# Patient Record
Sex: FEMALE | Race: WHITE | NOT HISPANIC OR LATINO | Employment: OTHER | URBAN - METROPOLITAN AREA
[De-identification: names, ages, dates, MRNs, and addresses within clinical notes are randomized per-mention and may not be internally consistent; named-entity substitution may affect disease eponyms.]

---

## 2019-09-25 ENCOUNTER — TRANSCRIBE ORDERS (OUTPATIENT)
Dept: ADMINISTRATIVE | Facility: HOSPITAL | Age: 69
End: 2019-09-25

## 2019-09-25 ENCOUNTER — APPOINTMENT (OUTPATIENT)
Dept: RADIOLOGY | Facility: CLINIC | Age: 69
End: 2019-09-25
Payer: MEDICARE

## 2019-09-25 DIAGNOSIS — M99.02 THORACIC SEGMENT DYSFUNCTION: ICD-10-CM

## 2019-09-25 DIAGNOSIS — M53.1 DIFFUSE CERVICOBRACHIAL SYNDROME: ICD-10-CM

## 2019-09-25 DIAGNOSIS — M99.01 CERVICAL SEGMENT DYSFUNCTION: ICD-10-CM

## 2019-09-25 DIAGNOSIS — M54.13 RADICULOPATHY OF CERVICOTHORACIC REGION: ICD-10-CM

## 2019-09-25 DIAGNOSIS — M99.02 THORACIC SEGMENT DYSFUNCTION: Primary | ICD-10-CM

## 2019-09-25 PROCEDURE — 72070 X-RAY EXAM THORAC SPINE 2VWS: CPT

## 2019-09-25 PROCEDURE — 72040 X-RAY EXAM NECK SPINE 2-3 VW: CPT

## 2021-02-24 ENCOUNTER — CONSULT (OUTPATIENT)
Dept: DERMATOLOGY | Age: 71
End: 2021-02-24
Payer: MEDICARE

## 2021-02-24 ENCOUNTER — OFFICE VISIT (OUTPATIENT)
Dept: URGENT CARE | Facility: CLINIC | Age: 71
End: 2021-02-24
Payer: MEDICARE

## 2021-02-24 VITALS
DIASTOLIC BLOOD PRESSURE: 95 MMHG | SYSTOLIC BLOOD PRESSURE: 180 MMHG | HEIGHT: 70 IN | HEART RATE: 80 BPM | WEIGHT: 214 LBS | BODY MASS INDEX: 30.64 KG/M2 | OXYGEN SATURATION: 100 % | TEMPERATURE: 98 F | RESPIRATION RATE: 16 BRPM

## 2021-02-24 VITALS — BODY MASS INDEX: 29.92 KG/M2 | TEMPERATURE: 98.6 F | WEIGHT: 209 LBS | HEIGHT: 70 IN

## 2021-02-24 DIAGNOSIS — D22.60 MULTIPLE BENIGN MELANOCYTIC NEVI OF UPPER EXTREMITY, LOWER EXTREMITY, AND TRUNK: ICD-10-CM

## 2021-02-24 DIAGNOSIS — D18.01 CHERRY ANGIOMA: ICD-10-CM

## 2021-02-24 DIAGNOSIS — L90.5 SCAR TISSUE: Primary | ICD-10-CM

## 2021-02-24 DIAGNOSIS — L81.4 SOLAR LENTIGO: ICD-10-CM

## 2021-02-24 DIAGNOSIS — D22.5 MULTIPLE BENIGN MELANOCYTIC NEVI OF UPPER EXTREMITY, LOWER EXTREMITY, AND TRUNK: ICD-10-CM

## 2021-02-24 DIAGNOSIS — D22.70 MULTIPLE BENIGN MELANOCYTIC NEVI OF UPPER EXTREMITY, LOWER EXTREMITY, AND TRUNK: ICD-10-CM

## 2021-02-24 DIAGNOSIS — L82.1 SEBORRHEIC KERATOSES: ICD-10-CM

## 2021-02-24 DIAGNOSIS — D48.5 NEOPLASM OF UNCERTAIN BEHAVIOR OF SKIN: Primary | ICD-10-CM

## 2021-02-24 DIAGNOSIS — L90.5 SCAR TISSUE: ICD-10-CM

## 2021-02-24 PROCEDURE — 88305 TISSUE EXAM BY PATHOLOGIST: CPT | Performed by: STUDENT IN AN ORGANIZED HEALTH CARE EDUCATION/TRAINING PROGRAM

## 2021-02-24 PROCEDURE — 99204 OFFICE O/P NEW MOD 45 MIN: CPT | Performed by: DERMATOLOGY

## 2021-02-24 PROCEDURE — 88342 IMHCHEM/IMCYTCHM 1ST ANTB: CPT | Performed by: STUDENT IN AN ORGANIZED HEALTH CARE EDUCATION/TRAINING PROGRAM

## 2021-02-24 PROCEDURE — 11103 TANGNTL BX SKIN EA SEP/ADDL: CPT | Performed by: DERMATOLOGY

## 2021-02-24 PROCEDURE — 88341 IMHCHEM/IMCYTCHM EA ADD ANTB: CPT | Performed by: STUDENT IN AN ORGANIZED HEALTH CARE EDUCATION/TRAINING PROGRAM

## 2021-02-24 PROCEDURE — 11102 TANGNTL BX SKIN SINGLE LES: CPT | Performed by: DERMATOLOGY

## 2021-02-24 PROCEDURE — 99213 OFFICE O/P EST LOW 20 MIN: CPT | Performed by: PHYSICIAN ASSISTANT

## 2021-02-24 RX ORDER — LEVOTHYROXINE SODIUM 112 UG/1
112 TABLET ORAL DAILY
COMMUNITY

## 2021-02-24 RX ORDER — TRIAMTERENE AND HYDROCHLOROTHIAZIDE 37.5; 25 MG/1; MG/1
1 TABLET ORAL DAILY
COMMUNITY
End: 2021-08-09 | Stop reason: SDUPTHER

## 2021-02-24 RX ORDER — PAROXETINE HYDROCHLORIDE 20 MG/1
20 TABLET, FILM COATED ORAL DAILY
COMMUNITY

## 2021-02-24 RX ORDER — FOSINOPRIL SODIUM 40 MG/1
40 TABLET ORAL DAILY
COMMUNITY
End: 2021-08-09 | Stop reason: SDUPTHER

## 2021-02-24 NOTE — PROGRESS NOTES
[facility] Note     Patient Name: Angelo Chavez  Encounter Date: 2/24/21     Have you been cared for by a [facility] in the last 3 years and, if so, which one? No    · Have you traveled outside of the [address] [place] [address] in the past 3 months or outside of the [facility] in the last 2 weeks? No     May we call your Preferred Phone number to discuss your specific medical information? Yes     May we leave a detailed message that includes your specific medical information? Yes      Today's Chief Concerns:   Concern #1:  Tissue scar   Concern #2:  Skin exam    Past Medical History:  Have you personally ever had or currently have any of the following? · Skin cancer (such as Melanoma, Basal Cell Carcinoma, Squamous Cell Carcinoma? (If Yes, please provide more detail)- No  · Eczema: No  · Psoriasis: No  · HIV/AIDS: No  · Hepatitis B or C: No  · Tuberculosis: No  · Systemic Immunosuppression such as Diabetes, Biologic or Immunotherapy, Chemotherapy, Organ Transplantation, Bone Marrow Transplantation (If YES, please provide more detail): No  · Radiation Treatment (If YES, please provide more detail): YES, for parotidei cancer   · Any other major medical conditions/concerns? (If Yes, which types)- No    Social History:     What is/was your primary occupation? retired     What are your hobbies/past-times? repairing    Family History:  Have any of your "first degree relatives" (parent, brother, sister, or child) had any of the following       · Skin cancer such as Melanoma or Merkel Cell Carcinoma or Pancreatic Cancer? No  · Eczema, Asthma, Hay Fever or Seasonal Allergies: No  · Psoriasis or Psoriatic Arthritis: No  · Do any other medical conditions seem to run in your family? If Yes, what condition and which relatives?   YES, daughter breast cancer    Current Medications:   (please update all dermatological medications before printing patient's AVS!)      Current Outpatient Medications:     fosinopril (MONOPRIL) 40 mg tablet, Take 40 mg by mouth daily, Disp: , Rfl:     levothyroxine (Unithroid) 112 mcg tablet, Take 112 mcg by mouth daily, Disp: , Rfl:     PARoxetine (PAXIL) 20 mg tablet, Take 20 mg by mouth daily, Disp: , Rfl:     triamterene-hydrochlorothiazide (MAXZIDE-25) 37 5-25 mg per tablet, Take 1 tablet by mouth daily, Disp: , Rfl:       Review of Systems:  Have you recently had or currently have any of the following? If YES, what are you doing for the problem? · Fever, chills or unintended weight loss: No  · Sudden loss or change in your vision: No  · Nausea, vomiting or blood in your stool: No  · Painful or swollen joints: No  · Wheezing or cough: No  · Changing mole or non-healing wound: No  · Nosebleeds: No  · Excessive sweating: No  · Easy or prolonged bleeding? No  · Over the last 2 weeks, how often have you been bothered by the following problems? · Taking little interest or pleasure in doing things: 1 - Not at All  · Feeling down, depressed, or hopeless: 1 - Not at All  · Rapid heartbeat with epinephrine:  No    · FEMALES ONLY:    · Are you pregnant or planning to become pregnant? No  · Are you currently or planning to be nursing or breast feeding? No    · Any known allergies? Allergies   Allergen Reactions    Amoxicillin Swelling   ·       Physical Exam:     Was a chaperone (Derm Clinical Assistant) present throughout the entire Physical Exam? Yes     Did the Dermatology Team specifically  the patient on the importance of a Full Skin Exam to be sure that nothing is missed clinically?  Yes}  o Did the patient ultimately request or accept a Full Skin Exam?  Yes  o Did the patient specifically refuse to have the areas "under-the-bra" examined by the Dermatologist? No  o Did the patient specifically refuse to have the areas "under-the-underwear" examined by the Dermatologist? No    CONSTITUTIONAL:   Vitals:    02/24/21 1115   Temp: 98 6 °F (37 °C)   TempSrc: Probe   Weight: 94 8 kg (209 lb)   Height: 5' 10" (1 778 m)           PSYCH: Normal mood and affect  EYES: Normal conjunctiva  ENT: Normal lips and oral mucosa  CARDIOVASCULAR: No edema  RESPIRATORY: Normal respirations  HEME/LYMPH/IMMUNO:  No regional lymphadenopathy except as noted below in "ASSESSMENT AND PLAN BY DIAGNOSIS"    SKIN:  FULL ORGAN SYSTEM EXAM   Hair, Scalp, Ears, Face Normal except as noted below in Assessment   Neck, Cervical Chain Nodes Normal except as noted below in Assessment   Right Arm/Hand/Fingers Normal except as noted below in Assessment   Left Arm/Hand/Fingers Normal except as noted below in Assessment   Chest/Breasts/Axillae Viewed areas Normal except as noted below in Assessment   Abdomen, Umbilicus Normal except as noted below in Assessment   Back/Spine Normal except as noted below in Assessment   Groin/Genitalia/Buttocks Normal except as noted below in Assessment   Right Leg, Foot, Toes Normal except as noted below in Assessment   Left Leg, Foot, Toes Normal except as noted below in Assessment        Assessment and Plan by Diagnosis:    History of Present Condition:     Duration:  How long has this been an issue for you?    o  10 years   Location Affected:  Where on the body is this affecting you?    o  left neck   Quality:  Is there any bleeding, pain, itch, burning/irritation, or redness associated with the skin lesion? o  bleeding   Severity:  Describe any bleeding, pain, itch, burning/irritation, or redness on a scale of 1 to 10 (with 10 being the worst)  o  5-6   Timing:  Does this condition seem to be there pretty constantly or do you notice it more at specific times throughout the day?     o  consistent   Context:  Have you ever noticed that this condition seems to be associated with specific activities you do?    o  denies   Modifying Factors:    o Anything that seems to make the condition worse?    -  steroid cream  o What have you tried to do to make the condition better?    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  Assessment and Plan:   I have discussed with the patient that a sample of skin via a "skin biopsy would be potentially helpful to further make a specific diagnosis under the microscope   Based on a thorough discussion of this condition and the management approach to it (including a comprehensive discussion of the known risks, side effects and potential benefits of treatment), the patient (family) agrees to implement the following specific plan:    o Procedure:  Skin Biopsy  After a thorough discussion of treatment options and risk/benefits/alternatives (including but not limited to local pain, scarring, dyspigmentation, blistering, possible superinfection, and inability to confirm a diagnosis via histopathology), verbal and written consent were obtained and portion of the rash was biopsied for tissue sample  See below for consent that was obtained from patient and subsequent Procedure Note  A: PROCEDURE SHAVE BIOPSY NOTE:     Performing Physician: Eduardo Burrell   Anatomic Location; Clinical Description with size (cm); Pre-Op Diagnosis:   o A: left neck superior; 1 cm vascular papule; (Differential Diagnosis):hemangioma rule out angiosarcoma    o    Post-op diagnosis: Same      Local anesthesia: 1% xylocaine with epi       Topical anesthesia: None     Hemostasis: Aluminum chloride       After obtaining informed consent  at which time there was a discussion about the purpose of biopsy  and low risks of infection and bleeding  The area was prepped and draped in the usual fashion  Anesthesia was obtained with 1% lidocaine with epinephrine  A shave biopsy to an appropriate sampling depth was obtained with a sterile blade (such as a 15-blade or DermaBlade)  The resulting wound was covered with surgical ointment and bandaged appropriately  The patient tolerated the procedure well without complications and was without signs of functional compromise  Specimen has been sent for review by Dermatopathology  Standard post-procedure care has been explained and has been included in written form within the patient's copy of Informed Consent  INFORMED CONSENT DISCUSSION AND POST-OPERATIVE INSTRUCTIONS FOR PATIENT    I   RATIONALE FOR PROCEDURE  I understand that a skin biopsy allows the Dermatologist to test a lesion or rash under the microscope to obtain a diagnosis  It usually involves numbing the area with numbing medication and removing a small piece of skin; sometimes the area will be closed with sutures  In this specific procedure, sutures are not usually needed  If any sutures are placed, then they are usually need to be removed in 2 weeks or less  I understand that my Dermatologist recommends that a skin "shave" biopsy be performed today  A local anesthetic, similar to the kind that a dentist uses when filling a cavity, will be injected with a very small needle into the skin area to be sampled  The injected skin and tissue underneath "will go to sleep and become numb so no pain should be felt afterwards    An instrument shaped like a tiny "razor blade" (shave biopsy instrument) will be used to cut a small piece of tissue and skin from the area so that a sample of tissue can be taken and examined more closely under the microscope  A slight amount of bleeding will occur, but it will be stopped with direct pressure and a pressure bandage and any other appropriate methods  I understands that a scar will form where the wound was created  Surgical ointment will be applied to help protect the wound  Sutures are not usually needed  II   RISKS AND POTENTIAL COMPLICATIONS   I understand the risks and potential complications of a skin biopsy include but are not limited to the following:   Bleeding   Infection   Pain   Scar/keloid   Skin discoloration   Incomplete Removal   Recurrence   Nerve Damage/Numbness/Loss of Function   Allergic Reaction to Anesthesia   Biopsies are diagnostic procedures and based on findings additional treatment or evaluation may be required   Loss or destruction of specimen resulting in no additional findings    My Dermatologist has explained to me the nature of the condition, the nature of the procedure, and the benefits to be reasonably expected compared with alternative approaches  My Dermatologist has discussed the likelihood of major risks or complications of this procedure including the specific risks listed above, such as bleeding, infection, and scarring/keloid  I understand that a scar is expected after this procedure  I understand that my physician cannot predict if the scar will form a "keloid," which extends beyond the borders of the wound that is created  A keloid is a thick, painful, and bumpy scar  A keloid can be difficult to treat, as it does not always respond well to therapy, which includes injecting cortisone directly into the keloid every few weeks  While this usually reduces the pain and size of the scar, it does not eliminate it  I understand that photographs may be taken before and after the procedure  These will be maintained as part of the medical providers confidential records and may not be made available to me    I further authorize the medical provider to use the photographs for teaching purposes or to illustrate scientific papers, books, or lectures if in his/her judgment, medical research, education, or science may benefit from its use  I have had an opportunity to fully inquire about the risks and benefits of this procedure and its alternatives  I have been given ample time and opportunity to ask questions and to seek a second opinion if I wished to do so  I acknowledge that there have specifically been no guarantees as to the cosmetic results from the procedure  I am aware that with any procedure there is always the possibility of an unexpected complication  III  POST-PROCEDURAL CARE (WHAT YOU WILL NEED TO DO "AFTER THE BIOPSY" TO OPTIMIZE HEALING)     Keep the area clean and dry  Try NOT to remove the bandage or get it wet for the first 24 hours   Gently clean the area and apply surgical ointment (such as Vaseline petrolatum ointment, which is available "over the counter" and not a prescription) to the biopsy site for up to 2 weeks straight  This acts to protect the wound from the outside world   Sutures are not usually placed in this procedure  If any sutures were placed, return for suture removal as instructed (generally 1 week for the face, 2 weeks for the body)   Take Acetaminophen (Tylenol) for discomfort, if no contraindications  Ibuprofen or aspirin could make bleeding worse   Call our office immediately for signs of infection: fever, chills, increased redness, warmth, tenderness, discomfort/pain, or pus or foul smell coming from the wound  WHAT TO DO IF THERE IS ANY BLEEDING? If a small amount of bleeding is noticed, place a clean cloth over the area and apply firm pressure for ten minutes  Check the wound after 10 minutes of direct pressure  If bleeding persists, try one more time for an additional 10 minutes of direct pressure on the area    If the bleeding becomes heavier or does not stop after the second attempt, or if you have any other questions about this procedure, then please call your [facility] Dermatologist by calling [phone] (SKIN)  I hereby acknowledge that I have reviewed and verified the site with my Dermatologist and have requested and authorized my Dermatologist to proceed with the procedure  B: PROCEDURE SHAVE BIOPSY NOTE:     Performing Physician: Sandra Cruz   Anatomic Location; Clinical Description with size (cm); Pre-Op Diagnosis:   o B: Left neck Inferior 1 2 cm crusted erosion; Differential Diagnosis): Squamous cell carcinoma versus Basal Cell carcinoma  o    Post-op diagnosis: Same      Local anesthesia: 1% xylocaine with epi       Topical anesthesia: None     Hemostasis: Aluminum chloride       After obtaining informed consent  at which time there was a discussion about the purpose of biopsy  and low risks of infection and bleeding  The area was prepped and draped in the usual fashion  Anesthesia was obtained with 1% lidocaine with epinephrine  A shave biopsy to an appropriate sampling depth was obtained with a sterile blade (such as a 15-blade or DermaBlade)  The resulting wound was covered with surgical ointment and bandaged appropriately  The patient tolerated the procedure well without complications and was without signs of functional compromise  Specimen has been sent for review by Dermatopathology  Standard post-procedure care has been explained and has been included in written form within the patient's copy of Informed Consent  INFORMED CONSENT DISCUSSION AND POST-OPERATIVE INSTRUCTIONS FOR PATIENT    I   RATIONALE FOR PROCEDURE  I understand that a skin biopsy allows the Dermatologist to test a lesion or rash under the microscope to obtain a diagnosis  It usually involves numbing the area with numbing medication and removing a small piece of skin; sometimes the area will be closed with sutures   In this specific procedure, sutures are not usually needed  If any sutures are placed, then they are usually need to be removed in 2 weeks or less  I understand that my Dermatologist recommends that a skin "shave" biopsy be performed today  A local anesthetic, similar to the kind that a dentist uses when filling a cavity, will be injected with a very small needle into the skin area to be sampled  The injected skin and tissue underneath "will go to sleep and become numb so no pain should be felt afterwards  An instrument shaped like a tiny "razor blade" (shave biopsy instrument) will be used to cut a small piece of tissue and skin from the area so that a sample of tissue can be taken and examined more closely under the microscope  A slight amount of bleeding will occur, but it will be stopped with direct pressure and a pressure bandage and any other appropriate methods  I understands that a scar will form where the wound was created  Surgical ointment will be applied to help protect the wound  Sutures are not usually needed  II   RISKS AND POTENTIAL COMPLICATIONS   I understand the risks and potential complications of a skin biopsy include but are not limited to the following:   Bleeding   Infection   Pain   Scar/keloid   Skin discoloration   Incomplete Removal   Recurrence   Nerve Damage/Numbness/Loss of Function   Allergic Reaction to Anesthesia   Biopsies are diagnostic procedures and based on findings additional treatment or evaluation may be required   Loss or destruction of specimen resulting in no additional findings    My Dermatologist has explained to me the nature of the condition, the nature of the procedure, and the benefits to be reasonably expected compared with alternative approaches  My Dermatologist has discussed the likelihood of major risks or complications of this procedure including the specific risks listed above, such as bleeding, infection, and scarring/keloid    I understand that a scar is expected after this procedure  I understand that my physician cannot predict if the scar will form a "keloid," which extends beyond the borders of the wound that is created  A keloid is a thick, painful, and bumpy scar  A keloid can be difficult to treat, as it does not always respond well to therapy, which includes injecting cortisone directly into the keloid every few weeks  While this usually reduces the pain and size of the scar, it does not eliminate it  I understand that photographs may be taken before and after the procedure  These will be maintained as part of the medical providers confidential records and may not be made available to me  I further authorize the medical provider to use the photographs for teaching purposes or to illustrate scientific papers, books, or lectures if in his/her judgment, medical research, education, or science may benefit from its use  I have had an opportunity to fully inquire about the risks and benefits of this procedure and its alternatives  I have been given ample time and opportunity to ask questions and to seek a second opinion if I wished to do so  I acknowledge that there have specifically been no guarantees as to the cosmetic results from the procedure  I am aware that with any procedure there is always the possibility of an unexpected complication  III  POST-PROCEDURAL CARE (WHAT YOU WILL NEED TO DO "AFTER THE BIOPSY" TO OPTIMIZE HEALING)     Keep the area clean and dry  Try NOT to remove the bandage or get it wet for the first 24 hours   Gently clean the area and apply surgical ointment (such as Vaseline petrolatum ointment, which is available "over the counter" and not a prescription) to the biopsy site for up to 2 weeks straight  This acts to protect the wound from the outside world   Sutures are not usually placed in this procedure    If any sutures were placed, return for suture removal as instructed (generally 1 week for the face, 2 weeks for the body)   Take Acetaminophen (Tylenol) for discomfort, if no contraindications  Ibuprofen or aspirin could make bleeding worse   Call our office immediately for signs of infection: fever, chills, increased redness, warmth, tenderness, discomfort/pain, or pus or foul smell coming from the wound  WHAT TO DO IF THERE IS ANY BLEEDING? If a small amount of bleeding is noticed, place a clean cloth over the area and apply firm pressure for ten minutes  Check the wound after 10 minutes of direct pressure  If bleeding persists, try one more time for an additional 10 minutes of direct pressure on the area  If the bleeding becomes heavier or does not stop after the second attempt, or if you have any other questions about this procedure, then please call your [facility] Dermatologist by calling [phone] (SKIN)  I hereby acknowledge that I have reviewed and verified the site with my Dermatologist and have requested and authorized my Dermatologist to proceed with the procedure  CHERRY ANGIOMAS     Physical Exam:  · Anatomic Location Affected:  Trunk and extremites  · Morphological Description:  Scattered cherry red papules  · Denies pain, itch, bleeding  No treatments tried  Present for years  Present constantly; no modifying factors which make it worse or better  Assessment and Plan:  Based on a thorough discussion of this condition and the management approach to it (including a comprehensive discussion of the known risks, side effects and potential benefits of treatment), the patient (family) agrees to implement the following specific plan:  · Reassure benign        SEBORRHEIC KERATOSIS; NON-INFLAMED     Physical Exam:  · Anatomic Location Affected:  Trunk and extremities  · Morphological Description:  Waxy, smooth to warty textured, yellow to brownish-grey to dark brown to blackish, discrete, "stuck-on" appearing papules  · Present for years   Denies pain, itch, bleeding  Additional History of Present Condition:  Present constantly; no modifying factors which make it worse or better  No prior treatment  Assessment and Plan:  Based on a thorough discussion of this condition and the management approach to it (including a comprehensive discussion of the known risks, side effects and potential benefits of treatment), the patient (family) agrees to implement the following specific plan:  · Reassure benign  · Use sun protection  Apply SPF 30 or higher at least three times a day  Wear sun protecting clothing and hats  SOLAR LENTIGINES      Physical Exam:   Anatomic Location Affected:  Sun exposed areas of back, chest, arms, legs   Morphological Description:  Multiple scattered brown to tan evenly pigmented macules    Denies pain, itch, bleeding  No treatments tried  Present for months - years  Reports getting newer lesions with sun exposure  Assessment and Plan:  Based on a thorough discussion of this condition and the management approach to it (including a comprehensive discussion of the known risks, side effects and potential benefits of treatment), the patient (family) agrees to implement the following specific plan:  · Reassure benign  · Use sun protection  Apply SPF 30 or higher at least three times a day  Wear sun protecting clothing and hats  MULTIPLE MELANOCYTIC NEVI ("Moles")     Physical Exam:  · Anatomic Location Affected: Trunk and extremities  · Morphological Description:  Scattered, round to ovoid, symmetrical-appearing, even bordered, skin colored to dark brown macules/papules  · Denies pain, itch, bleeding  No treatments tried  Present for years  Present constantly; no modifying factors which make it worse or better  Denies actively changing or growing moles        Assessment and Plan:  Based on a thorough discussion of this condition and the management approach to it (including a comprehensive discussion of the known risks, side effects and potential benefits of treatment), the patient (family) agrees to implement the following specific plan:  · Reassure benign  · Monitor for changes  · Use sun protection  Apply SPF 30 or higher at least three times a day  Wear sun protecting clothing and hats  Worrisome signs of skin malignancy discussed, questions answered  Regular self-skin check discussed  Advised to call or return to office if patient notices any spots of concern, rapidly growing/changing lesions, bleeding lesions, non-healing lesions  Advised regular SPF use         Scribe Attestation    I,:  Zachary Grewal am acting as a scribe while in the presence of the attending physician :       I,:  Delia Jefferson MD personally performed the services described in this documentation    as scribed in my presence :

## 2021-02-24 NOTE — PATIENT INSTRUCTIONS
Stop applying the cortisone cream  Apply a moisturizing cream or lotion to the area to prevent dryness  Continue to apply neosporin or Vaseline to open wounds and cover with a bandage when wearing collared shirts  Follow up with a dermatologist in 3-5 days  Proceed to the ER if symptoms worsen

## 2021-02-24 NOTE — PROGRESS NOTES
[facility]        NAME: Nancy Walden is a 80 y o  female  : 1950    MRN: [id number]  DATE: 2021  TIME: 9:02 AM    Assessment and Plan   Scar tissue [L90 5]  1  Scar tissue  Ambulatory referral to Dermatology     Reviewed with patient that bleeding and blood blister formation are likely secondary to repetitive friction of thin tissue  Thinning is likely exacerbated by daily application of steroid cream  Advised to d/c this for now and to apply a lotion or Vaseline to help with moisturizing  Pt is to f/u with dermatology  She is agreeable tot this plan  All questions answered  Precautions given  Patient Instructions     Stop applying the cortisone cream  Apply a moisturizing cream or lotion to the area to prevent dryness  Continue to apply neosporin or Vaseline to open wounds and cover with a bandage when wearing collared shirts  Follow up with a dermatologist in 3-5 days  Proceed to the ER if symptoms worsen  Chief Complaint     Chief Complaint   Patient presents with    Wound Check     Pt reports of left neck bleeding and irritation  Pt denies any pain  History of Present Illness   81 y/o female with c/o bleeding from scar tissue  Reports to having her left parotid gland and lymph nodes of the left neck removed years ago secondary to cancer  At the time underwent chemotherapy and radiation  Since then has had scar tissue of the left neck that until recently was not bothersome  Was instructed to apply hydrocortisone 2 5 % cream daily to this area and has been doing so for past 2-3 months  Over past few weeks has had recurrent bleeding of the neck caused by shirt or other items rubbing against the neck  Is seeking care today noting bleeding has become very bothersome and disruptive to her daily living  She is apply neosporin to open wound areas then bandaging  Denies expanding redness, drainage, or crusting         Review of Systems   Review of Systems   Constitutional: Negative for chills, diaphoresis and fever  Musculoskeletal: Negative for myalgias and neck pain  Skin: Positive for wound  Negative for color change  Current Medications       Current Outpatient Medications:     fosinopril (MONOPRIL) 40 mg tablet, Take 40 mg by mouth daily, Disp: , Rfl:     levothyroxine (Unithroid) 112 mcg tablet, Take 112 mcg by mouth daily, Disp: , Rfl:     PARoxetine (PAXIL) 20 mg tablet, Take 20 mg by mouth daily, Disp: , Rfl:     triamterene-hydrochlorothiazide (MAXZIDE-25) 37 5-25 mg per tablet, Take 1 tablet by mouth daily, Disp: , Rfl:     Current Allergies     Allergies as of 02/24/2021 - Reviewed 02/24/2021   Allergen Reaction Noted    Amoxicillin Swelling 02/24/2021            The following portions of the patient's history were reviewed and updated as appropriate: allergies, current medications, past family history, past medical history, past social history, past surgical history and problem list      Past Medical History:   Diagnosis Date    Cancer [facility])        Past Surgical History:   Procedure Laterality Date    HIP SURGERY Bilateral     REPLACEMENT TOTAL KNEE BILATERAL Left        History reviewed  No pertinent family history  Medications have been verified  Objective   BP (!) 180/95   Pulse 80   Temp 98 °F (36 7 °C)   Resp 16   Ht 5' 10" (1 778 m)   Wt 97 1 kg (214 lb)   SpO2 100%   BMI 30 71 kg/m²   No LMP recorded  Physical Exam     Physical Exam  Vitals signs and nursing note reviewed  Constitutional:       General: She is not in acute distress  Appearance: She is well-developed  She is not ill-appearing or diaphoretic  HENT:      Head: Normocephalic and atraumatic  Eyes:      General: Lids are normal       Conjunctiva/sclera: Conjunctivae normal    Skin:     General: Skin is warm and dry        Comments: Scar tissue of the lateral left neck with an abraded area at the base of the neck and a blood containing vesicle of the midline neck  Area is NTTP  No signs of infection  Neurological:      General: No focal deficit present  Mental Status: She is alert  She is not disoriented        Coordination: Coordination normal       Gait: Gait normal    Psychiatric:         Attention and Perception: Attention normal          Behavior: Behavior normal

## 2021-02-24 NOTE — PATIENT INSTRUCTIONS
NEOPLASM OF UNCERTAIN BEHAVIOR OF SKIN    Assessment and Plan:   I have discussed with the patient that a sample of skin via a "skin biopsy would be potentially helpful to further make a specific diagnosis under the microscope   Based on a thorough discussion of this condition and the management approach to it (including a comprehensive discussion of the known risks, side effects and potential benefits of treatment), the patient (family) agrees to implement the following specific plan:    o Procedure:  Skin Biopsy  After a thorough discussion of treatment options and risk/benefits/alternatives (including but not limited to local pain, scarring, dyspigmentation, blistering, possible superinfection, and inability to confirm a diagnosis via histopathology), verbal and written consent were obtained and portion of the rash was biopsied for tissue sample  See below for consent that was obtained from patient and subsequent Procedure Note  A: PROCEDURE SHAVE BIOPSY NOTE:          After obtaining informed consent  at which time there was a discussion about the purpose of biopsy  and low risks of infection and bleeding  The area was prepped and draped in the usual fashion  Anesthesia was obtained with 1% lidocaine with epinephrine  A shave biopsy to an appropriate sampling depth was obtained with a sterile blade (such as a 15-blade or DermaBlade)  The resulting wound was covered with surgical ointment and bandaged appropriately  The patient tolerated the procedure well without complications and was without signs of functional compromise  Specimen has been sent for review by Dermatopathology  Standard post-procedure care has been explained and has been included in written form within the patient's copy of Informed Consent      INFORMED CONSENT DISCUSSION AND POST-OPERATIVE INSTRUCTIONS FOR PATIENT    I   RATIONALE FOR PROCEDURE  I understand that a skin biopsy allows the Dermatologist to test a lesion or rash under the microscope to obtain a diagnosis  It usually involves numbing the area with numbing medication and removing a small piece of skin; sometimes the area will be closed with sutures  In this specific procedure, sutures are not usually needed  If any sutures are placed, then they are usually need to be removed in 2 weeks or less  I understand that my Dermatologist recommends that a skin "shave" biopsy be performed today  A local anesthetic, similar to the kind that a dentist uses when filling a cavity, will be injected with a very small needle into the skin area to be sampled  The injected skin and tissue underneath "will go to sleep and become numb so no pain should be felt afterwards  An instrument shaped like a tiny "razor blade" (shave biopsy instrument) will be used to cut a small piece of tissue and skin from the area so that a sample of tissue can be taken and examined more closely under the microscope  A slight amount of bleeding will occur, but it will be stopped with direct pressure and a pressure bandage and any other appropriate methods  I understands that a scar will form where the wound was created  Surgical ointment will be applied to help protect the wound  Sutures are not usually needed  II   RISKS AND POTENTIAL COMPLICATIONS   I understand the risks and potential complications of a skin biopsy include but are not limited to the following:   Bleeding   Infection   Pain   Scar/keloid   Skin discoloration   Incomplete Removal   Recurrence   Nerve Damage/Numbness/Loss of Function   Allergic Reaction to Anesthesia   Biopsies are diagnostic procedures and based on findings additional treatment or evaluation may be required   Loss or destruction of specimen resulting in no additional findings    My Dermatologist has explained to me the nature of the condition, the nature of the procedure, and the benefits to be reasonably expected compared with alternative approaches  My Dermatologist has discussed the likelihood of major risks or complications of this procedure including the specific risks listed above, such as bleeding, infection, and scarring/keloid  I understand that a scar is expected after this procedure  I understand that my physician cannot predict if the scar will form a "keloid," which extends beyond the borders of the wound that is created  A keloid is a thick, painful, and bumpy scar  A keloid can be difficult to treat, as it does not always respond well to therapy, which includes injecting cortisone directly into the keloid every few weeks  While this usually reduces the pain and size of the scar, it does not eliminate it  I understand that photographs may be taken before and after the procedure  These will be maintained as part of the medical providers confidential records and may not be made available to me  I further authorize the medical provider to use the photographs for teaching purposes or to illustrate scientific papers, books, or lectures if in his/her judgment, medical research, education, or science may benefit from its use  I have had an opportunity to fully inquire about the risks and benefits of this procedure and its alternatives  I have been given ample time and opportunity to ask questions and to seek a second opinion if I wished to do so  I acknowledge that there have specifically been no guarantees as to the cosmetic results from the procedure  I am aware that with any procedure there is always the possibility of an unexpected complication  III  POST-PROCEDURAL CARE (WHAT YOU WILL NEED TO DO "AFTER THE BIOPSY" TO OPTIMIZE HEALING)     Keep the area clean and dry  Try NOT to remove the bandage or get it wet for the first 24 hours       Gently clean the area and apply surgical ointment (such as Vaseline petrolatum ointment, which is available "over the counter" and not a prescription) to the biopsy site for up to 2 weeks straight  This acts to protect the wound from the outside world   Sutures are not usually placed in this procedure  If any sutures were placed, return for suture removal as instructed (generally 1 week for the face, 2 weeks for the body)   Take Acetaminophen (Tylenol) for discomfort, if no contraindications  Ibuprofen or aspirin could make bleeding worse   Call our office immediately for signs of infection: fever, chills, increased redness, warmth, tenderness, discomfort/pain, or pus or foul smell coming from the wound  WHAT TO DO IF THERE IS ANY BLEEDING? If a small amount of bleeding is noticed, place a clean cloth over the area and apply firm pressure for ten minutes  Check the wound after 10 minutes of direct pressure  If bleeding persists, try one more time for an additional 10 minutes of direct pressure on the area  If the bleeding becomes heavier or does not stop after the second attempt, or if you have any other questions about this procedure, then please call your [facility] by calling [phone] (SKIN)  I hereby acknowledge that I have reviewed and verified the site with my Dermatologist and have requested and authorized my Dermatologist to proceed with the procedure  B: PROCEDURE SHAVE BIOPSY NOTE:          After obtaining informed consent  at which time there was a discussion about the purpose of biopsy  and low risks of infection and bleeding  The area was prepped and draped in the usual fashion  Anesthesia was obtained with 1% lidocaine with epinephrine  A shave biopsy to an appropriate sampling depth was obtained with a sterile blade (such as a 15-blade or DermaBlade)  The resulting wound was covered with surgical ointment and bandaged appropriately  The patient tolerated the procedure well without complications and was without signs of functional compromise  Specimen has been sent for review by Dermatopathology      Standard post-procedure care has been explained and has been included in written form within the patient's copy of Informed Consent  INFORMED CONSENT DISCUSSION AND POST-OPERATIVE INSTRUCTIONS FOR PATIENT    I   RATIONALE FOR PROCEDURE  I understand that a skin biopsy allows the Dermatologist to test a lesion or rash under the microscope to obtain a diagnosis  It usually involves numbing the area with numbing medication and removing a small piece of skin; sometimes the area will be closed with sutures  In this specific procedure, sutures are not usually needed  If any sutures are placed, then they are usually need to be removed in 2 weeks or less  I understand that my Dermatologist recommends that a skin "shave" biopsy be performed today  A local anesthetic, similar to the kind that a dentist uses when filling a cavity, will be injected with a very small needle into the skin area to be sampled  The injected skin and tissue underneath "will go to sleep and become numb so no pain should be felt afterwards  An instrument shaped like a tiny "razor blade" (shave biopsy instrument) will be used to cut a small piece of tissue and skin from the area so that a sample of tissue can be taken and examined more closely under the microscope  A slight amount of bleeding will occur, but it will be stopped with direct pressure and a pressure bandage and any other appropriate methods  I understands that a scar will form where the wound was created  Surgical ointment will be applied to help protect the wound  Sutures are not usually needed      II   RISKS AND POTENTIAL COMPLICATIONS   I understand the risks and potential complications of a skin biopsy include but are not limited to the following:   Bleeding   Infection   Pain   Scar/keloid   Skin discoloration   Incomplete Removal   Recurrence   Nerve Damage/Numbness/Loss of Function   Allergic Reaction to Anesthesia   Biopsies are diagnostic procedures and based on findings additional treatment or evaluation may be required   Loss or destruction of specimen resulting in no additional findings    My Dermatologist has explained to me the nature of the condition, the nature of the procedure, and the benefits to be reasonably expected compared with alternative approaches  My Dermatologist has discussed the likelihood of major risks or complications of this procedure including the specific risks listed above, such as bleeding, infection, and scarring/keloid  I understand that a scar is expected after this procedure  I understand that my physician cannot predict if the scar will form a "keloid," which extends beyond the borders of the wound that is created  A keloid is a thick, painful, and bumpy scar  A keloid can be difficult to treat, as it does not always respond well to therapy, which includes injecting cortisone directly into the keloid every few weeks  While this usually reduces the pain and size of the scar, it does not eliminate it  I understand that photographs may be taken before and after the procedure  These will be maintained as part of the medical providers confidential records and may not be made available to me  I further authorize the medical provider to use the photographs for teaching purposes or to illustrate scientific papers, books, or lectures if in his/her judgment, medical research, education, or science may benefit from its use  I have had an opportunity to fully inquire about the risks and benefits of this procedure and its alternatives  I have been given ample time and opportunity to ask questions and to seek a second opinion if I wished to do so  I acknowledge that there have specifically been no guarantees as to the cosmetic results from the procedure  I am aware that with any procedure there is always the possibility of an unexpected complication  III   POST-PROCEDURAL CARE (WHAT YOU WILL NEED TO DO "AFTER THE BIOPSY" TO OPTIMIZE HEALING)     Keep the area clean and dry  Try NOT to remove the bandage or get it wet for the first 24 hours   Gently clean the area and apply surgical ointment (such as Vaseline petrolatum ointment, which is available "over the counter" and not a prescription) to the biopsy site for up to 2 weeks straight  This acts to protect the wound from the outside world   Sutures are not usually placed in this procedure  If any sutures were placed, return for suture removal as instructed (generally 1 week for the face, 2 weeks for the body)   Take Acetaminophen (Tylenol) for discomfort, if no contraindications  Ibuprofen or aspirin could make bleeding worse   Call our office immediately for signs of infection: fever, chills, increased redness, warmth, tenderness, discomfort/pain, or pus or foul smell coming from the wound  WHAT TO DO IF THERE IS ANY BLEEDING? If a small amount of bleeding is noticed, place a clean cloth over the area and apply firm pressure for ten minutes  Check the wound after 10 minutes of direct pressure  If bleeding persists, try one more time for an additional 10 minutes of direct pressure on the area  If the bleeding becomes heavier or does not stop after the second attempt, or if you have any other questions about this procedure, then please call your [facility] [address] Dermatologist by calling [phone] (SKIN)  I hereby acknowledge that I have reviewed and verified the site with my Dermatologist and have requested and authorized my Dermatologist to proceed with the procedure              LEZAMA ANGIOMAS     Assessment and Plan:  Based on a thorough discussion of this condition and the management approach to it (including a comprehensive discussion of the known risks, side effects and potential benefits of treatment), the patient (family) agrees to implement the following specific plan:  · Reassure benign        SEBORRHEIC KERATOSIS; NON-INFLAMED        Assessment and Plan:  Based on a thorough discussion of this condition and the management approach to it (including a comprehensive discussion of the known risks, side effects and potential benefits of treatment), the patient (family) agrees to implement the following specific plan:  · Reassure benign  · Use sun protection  Apply SPF 30 or higher at least three times a day  Wear sun protecting clothing and hats  SOLAR LENTIGINES      Assessment and Plan:  Based on a thorough discussion of this condition and the management approach to it (including a comprehensive discussion of the known risks, side effects and potential benefits of treatment), the patient (family) agrees to implement the following specific plan:  · Reassure benign  · Use sun protection  Apply SPF 30 or higher at least three times a day  Wear sun protecting clothing and hats  MULTIPLE MELANOCYTIC NEVI ("Moles")     Assessment and Plan:  Based on a thorough discussion of this condition and the management approach to it (including a comprehensive discussion of the known risks, side effects and potential benefits of treatment), the patient (family) agrees to implement the following specific plan:  · Reassure benign  · Monitor for changes  · Use sun protection  Apply SPF 30 or higher at least three times a day  Wear sun protecting clothing and hats  Worrisome signs of skin malignancy discussed, questions answered  Regular self-skin check discussed  Advised to call or return to office if patient notices any spots of concern, rapidly growing/changing lesions, bleeding lesions, non-healing lesions  Advised regular SPF use

## 2021-03-01 DIAGNOSIS — Z23 ENCOUNTER FOR IMMUNIZATION: ICD-10-CM

## 2021-03-03 ENCOUNTER — TELEPHONE (OUTPATIENT)
Dept: DERMATOLOGY | Facility: CLINIC | Age: 71
End: 2021-03-03

## 2021-03-03 NOTE — RESULT ENCOUNTER NOTE
Tissue Exam: [id number]  Order: [id number]  Status:  Final result   Visible to patient:  Yes ([address]) Dx:  Neoplasm of uncertain behavior of skin  Component   Case Report  Surgical Pathology Report                         Case: [id number]                                   Authorizing Provider: Eliezer Rodriguez MD      Collected:           02/24/2021 [facility]              Ordering Location:     [facility]      Received:            02/24/2021 [address] [place] [address]                                                                   Pathologist:           Joy Lyon MD                                                           Specimens:   A) - Skin, Other, Specimen A: Left neck superior                                                    B) - Skin, Other, Specimen B: Left neck inferior                                         Final Diagnosis  A  Skin, left neck superior, shave biopsy:     Superficial dermal vascular proliferation; extending to the tissue edges (see note)      Note: Ulceration, fibrin deposition, and dermal mixed inflammatory infiltrate are seen  The endothelial cells are positive for CD31 and negative for MYC  Given the history of radiation to this site, the histologic findings are 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  Because the base of the lesion is not well visualized, a more significant lesion cannot be excluded         B  Skin, left neck inferior, shave biopsy:     Ectatic superficial vessels surrounding by papillary dermal hyalinization (see note)      Note: The histologic findings include focal ulceration with fibrin deposition and mixed dermal inflammatory infiltrate  The endothelial cells are positive for CD31 and negative for MYC  The histologic findings are suggestive of CHRONIC RADIATION DERMATITIS  A concurrent postradiation atypical vascular lesion cannot be fully excluded   Clinical pathological correlation is advised      This case was sent for expert dermatopathology consultation by Dr Sujata Webster, whose report is below  Electronically signed by Carlita Larios MD on 3/2/2021 at  5:19 PM  Note   Dermatopathology Consultation 87-     FINAL DIAGNOSIS:                                                                          Report date: 3/2/2021      A  SKIN  (LEFT NECK SUPERIOR)  SHAVE : Vascular ectasias associated with fibrin, acute and chronic inflammation and surface ulceration  (see note)   Note: There is no evidence of endothelial cell atypia to make a diagnosis of angiosarcoma  However, the sample is very superficial and precise classification of this lesion is difficult  A deeper, ideally incisional biopsy is recommended if angiosarcoma remains a clinical consideration  Immunohistochemical stains show expression of CD31 and negative c-myc  The latter stain is often positive in angiosarcoma and negative in benign vascular lesions  Further clinico-pathological correlation is needed      B  SKIN  (LEFT NECK INFERIOR)  SHAVE : Vascular ectasias associated with fibrin, acute and chronic inflammation and surface ulceration  (see note)   Note: There is no evidence of endothelial cell atypia to make a diagnosis of angiosarcoma  However, the sample is very superficial and precise classification of this lesion is difficult  A deeper, ideally incisional biopsy is recommended if angiosarcoma remains a clinical consideration  Immunohistochemical stains show expression of CD31 and negative c-myc  The latter stain is often positive in angiosarcoma and negative in benign vascular lesions  Further clinico-pathological correlation is needed  _____________________________________________________  Final report electronically signed out  by Drake KRUEGER  Ph D  on 3/2/2021        DERMATOPATHOLOGY RESULT NOTE    Results reviewed by ordering physician  Called patient to personally discuss results   Discussed results with patient         Instructions for Clinical Derm Team:   (remember to route Result Note to appropriate staff):    Call patient and schedule for excision (procedure long) either Monday afternoon in [facility] or Wednesday morning in [facility] by Specimen:    Specimen A: indeterminate  Plan: excision      Specimen B: benign  Plan: monitor

## 2021-03-03 NOTE — TELEPHONE ENCOUNTER
Call made to pt to schedule an appt for excision at the [place] or [place] office with Dr Briseida STEVENSON asking her to call us back to be scheduled

## 2021-03-31 ENCOUNTER — PROCEDURE VISIT (OUTPATIENT)
Dept: DERMATOLOGY | Age: 71
End: 2021-03-31
Payer: MEDICARE

## 2021-03-31 VITALS — TEMPERATURE: 97.6 F | WEIGHT: 213.4 LBS | BODY MASS INDEX: 30.55 KG/M2 | HEIGHT: 70 IN

## 2021-03-31 DIAGNOSIS — L74.3 MILIARIA: ICD-10-CM

## 2021-03-31 DIAGNOSIS — D48.5 NEOPLASM OF UNCERTAIN BEHAVIOR OF SKIN: Primary | ICD-10-CM

## 2021-03-31 PROCEDURE — 88305 TISSUE EXAM BY PATHOLOGIST: CPT | Performed by: STUDENT IN AN ORGANIZED HEALTH CARE EDUCATION/TRAINING PROGRAM

## 2021-03-31 PROCEDURE — 99213 OFFICE O/P EST LOW 20 MIN: CPT | Performed by: DERMATOLOGY

## 2021-03-31 PROCEDURE — 11104 PUNCH BX SKIN SINGLE LESION: CPT | Performed by: DERMATOLOGY

## 2021-03-31 NOTE — PATIENT INSTRUCTIONS
SKIN PUNCH BIOPSY    Rationale for Procedure  A skin punch biopsy allows the dermatologist to further examine a lesion or rash under the microscope to potentially obtain a more specific diagnosis  It usually involves numbing the area with numbing medication and removing a small piece of skin  Usually, the area will be closed with sutures at the end of the procedure  Sutures were not used during today's procedure  Description of Procedure  We would like to perform a skin punch biopsy today  A local anesthetic, similar to the kind that a dentist uses when filling a cavity, will be injected with a very small needle into the skin area to be sampled  The injected skin and tissue underneath should go to sleep and become numbed so that no further pain should be felt  An instrument shaped like a tiny "cookie cutter" (i e , the punch biopsy instrument) will be used to cut a small round piece of skin and tissue from the area so that the sample may be taken and examined more closely under the microscope  A slight amount of bleeding will occur, but it is usually stopped with direct pressure, chemical cautery, and/or a pressure bandage; rarely, electrocautery and other means of intervention may be necessary to help stop the bleeding  A few sutures/stitches are usually applied to help aid in wound healing  Surgical Vaseline-type ointment will also applied after the procedure to help create a barrier between the wound and the outside world      Risks and Potential Complications  While the advantage of a skin punch biopsy is that it allows us to potentially examine the skin more closely under the microscope, there are some risks and potential complications that include but are not limited to the following:  - Bleeding  - Infection  - Pain  - Scar/keloid  - Skin discoloration  - Incomplete removal of the lesion or rash being sampled (in other words, this procedure is intended as a sampling and is not considered a definitive treatment)  - Recurrence of the lesion or rash being sampled  - Nerve Damage/Numbness/Loss of Function  - Allergic Reaction to Anesthesia  - Biopsies are diagnostic procedures and, based on findings, additional treatment or evaluation may be required (in other words, this procedure is intended as a sampling and is not considered a definitive treatment)  - Loss or destruction of the sample specimen could result in no additional findings  - The person at the microscope may not be able to provide additional information other than what we already know or suspect  What You Will Need to Do After the Procedure  1  Keep the area clean and dry  Try NOT to remove the bandage for the first 24 hours  2  Gently clean the area and apply Vaseline ointment (this is over the counter and not a prescription) to the biopsy site for up to 2 weeks  3  Sutures were not used during today's procedure  4  Take Acetaminophen (Tylenol) for discomfort, if no contraindications  Do NOT take Ibuprofen or aspirin unless specifically told to do so by your Dermatologist because these medications can make bleeding worse  5  Call our office immediately for signs of infection: fever, chills, increased redness, warmth, tenderness, discomfort/pain, or pus or foul smell coming from the wound  If a small amount of bleeding is noticed, place a clean cloth over the area and apply firm pressure for ten minutes  Check the wound ONLY after 10 minutes of direct pressure; do not cheat and sneak a peak, as that does not count  If bleeding persists after 10 minutes of legitimate direct pressure, then try one more round of direct pressure for an additional 10 minutes to the area  Should the bleeding become heavier or not stop after the second attempt, call [facility] directly at [phone] (SKIN) or, if after hours, go to your local Emergency Room/Emergency Department

## 2021-03-31 NOTE — PROGRESS NOTES
[facility] Follow Up Note    Patient Name: Angelito Hedrick  Encounter Date: 3/31/2021    Today's Chief Concerns:  Lala Greco Concern #1:  Tissue scar follow up    Current Medications:    Current Outpatient Medications:     fosinopril (MONOPRIL) 40 mg tablet, Take 40 mg by mouth daily, Disp: , Rfl:     levothyroxine (Unithroid) 112 mcg tablet, Take 112 mcg by mouth daily, Disp: , Rfl:     PARoxetine (PAXIL) 20 mg tablet, Take 20 mg by mouth daily, Disp: , Rfl:     triamterene-hydrochlorothiazide (MAXZIDE-25) 37 5-25 mg per tablet, Take 1 tablet by mouth daily, Disp: , Rfl:     CONSTITUTIONAL:   Vitals:    03/31/21 0833   Temp: 97 6 °F (36 4 °C)   TempSrc: Tympanic   Weight: 96 8 kg (213 lb 6 4 oz)   Height: 5' 10" (1 778 m)         Specific Alerts:    Have you been seen by a [facility] Dermatologist in the last 3 years? YES    Are you pregnant or planning to become pregnant? No    Are you currently or planning to be nursing or breast feeding? No    Allergies   Allergen Reactions    Amoxicillin Swelling       May we call your Preferred Phone number to discuss your specific medical information? YES    May we leave a detailed message that includes your specific medical information? YES    Have you traveled outside of the [facility] in the past 3 months? No    Do you currently have a pacemaker or defibrillator? No    Do you have any artificial heart valves, joints, plates, screws, rods, stents, pins, etc? No    Do you require any medications prior to a surgical procedure? No    Are you taking any medications that cause you to bleed more easily ("blood thinners") No    Have you ever experienced a rapid heartbeat with epinephrine? No    Have you ever been treated with "gold" (gold sodium thiomalate) therapy? No    [facility] can help with wrinkles, "laugh lines," facial volume loss, "double chin," "love handles," age spots, and more   Are you interested in learning today about some of the skin enhancement procedures that we offer? (If Yes, please provide more detail) No    Review of Systems:  Have you recently had or currently have any of the following? · Fever or chills: No  · Night Sweats: No  · Headaches: No  · Weight Gain: No  · Weight Loss: No  · Blurry Vision: No  · Nausea: No  · Vomiting: No  · Diarrhea: No  · Blood in Stool: No  · Abdominal Pain: No  · Itchy Skin: No  · Painful Joints: No  · Swollen Joints: No  · Muscle Pain: No  · Irregular Mole: No  · Sun Burn: No  · Dry Skin: No  · Skin Color Changes: No  · Scar or Keloid: No  · Cold Sores/Fever Blisters: No  · Bacterial Infections/MRSA: No  · Anxiety: No  · Depression: No  · Suicidal or Homicidal Thoughts: No      PSYCH: Normal mood and affect  EYES: Normal conjunctiva  ENT: Normal lips and oral mucosa  CARDIOVASCULAR: No edema  RESPIRATORY: Normal respirations  HEME/LYMPH/IMMUNO:  No regional lymphadenopathy except as noted below in ASSESSMENT AND PLAN BY DIAGNOSIS    FULL ORGAN SYSTEM SKIN EXAM (SKIN)   Face, Neck Normal except as noted below in Assessment       1  NEOPLASM OF UNCERTAIN BEHAVIOR OF SKIN    Physical Exam:   (Anatomic Location); (Size and Morphological Description); (Differential Diagnosis):  o Left neck inferior; 0 7 cm long scar with surrounding sclerotic skin and vascular prominence; radiation induced vascular proliferation rule out angiosarcoma   Pertinent Positives:   Pertinent Negatives: Additional History of Present Condition:  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  Assessment and Plan:   I have discussed with the patient that a sample of skin via a "skin biopsy would be potentially helpful to further make a specific diagnosis under the microscope     Based on a thorough discussion of this condition and the management approach to it (including a comprehensive discussion of the known risks, side effects and potential benefits of treatment), the patient (family) agrees to implement the following specific plan:    o Procedure:  Skin Biopsy  After a thorough discussion of treatment options and risk/benefits/alternatives (including but not limited to local pain, scarring, dyspigmentation, blistering, possible superinfection, and inability to confirm a diagnosis via histopathology), verbal and written consent were obtained and portion of the rash was biopsied for tissue sample  See below for consent that was obtained from patient and subsequent Procedure Note  PROCEDURE NOTE:  PUNCH BIOPSY      Performing Physician: Dr Hay    Anatomic Location; Clinical Description with size (cm); Pre-Op Diagnosis:     Left neck inferior; 0 7 cm long scar with surrounding sclerotic skin and vascular prominence and nonhealing erosion; radiation induced vascular proliferation rule out angiosarcoma       Anesthesia: 1% xylocaine with epi       Topical anesthesia: None       Indications: To indicate diagnosis and management plan  Procedure Details     Patient informed of the risks (including bleeding,scaring and infection) and benefits of the procedure explained  Verbal and written informed consent obtained  The area was prepped and draped in the usual fashion  Anesthesia was obtained with 1% lidocaine with epinephrine  The skin was then stretched perpendicular to the skin tension lines and a punch biopsy to an appropriate sampling depth was obtained with a 6 mm punch with a forceps and iris scissors  Hemostasis was obtained gel foam and aluminum chloride  Complications:  None      Specimen has been sent for review by Dermatopathology  Plan:  1  Instructed to keep the wound dry and covered for 24-48h and clean thereafter  2  Warning signs of infection were reviewed  3  Recommended that the patient use acetaminophen as needed for pain  4  Sutures were not used        Standard post-procedure care has been explained and has been included in written form within the patient's copy of Informed Consent  2  MILIARIA     Physical Exam:   Anatomic Location Affected:  Right inner eye   Size of Lesion: 0 2 cm    Morphological Description:  white papule   Pertinent Positives:   Pertinent Negatives: Additional History of Present Condition:  Patient started seeing more of the area on her inner eye  Assessment and Plan:  Based on a thorough discussion of this condition and the management approach to it (including a comprehensive discussion of the known risks, side effects and potential benefits of treatment), the patient (family) agrees to implement the following specific plan:   Drained cyst in office, courtesy     Assessment and Plan:  Catarina Stairs is a common skin disease caused by blockage and/or inflammation of eccrine sweat ducts  Catarina Stairs is frequently seen in hot, humid or tropical climates, in patients in hospital, and in the  period  Catarina Stairs is also known as "sweat rash" or "heat rash "    Miliaria results from sweating  The main contributing causes are:   Immature sweat ducts in a  child   A hot and humid environment   Intense physical activity   Fever   Occlusion of the skin with non-porous dressings or synthetic clothing against the skin (such as a diaper)   Prolonged bed rest (such as in the hospital)    Based on the level of the sweat duct obstruction, miliaria is divided into three subtypes:     Miliaria crystallina (sudamina), caused by obstruction of the sweat ducts close to the surface of the skin (epidermis); Jonny Hardy is most commonly seen in neonates with the mean age of 1 week, affecting up to 9% of all neonates  It can also occur in adults with fever  Jonny Hardy appears as 1-2 mm superficial clear blisters that easily break  The blisters can look like beads of sweat  There is no inflammation  The blisters are usually seen widely spread on the head, neck, and upper trunk       Miliaria rubra, caused by obstruction of the sweat ducts deeper in the epidermis; Miliaria rubra is the most common form of miliaria  It is usually seen in children and in up to 30% of adults who move to a tropical environment or are unexpectedly exposed to heat and humidity  Donnice Bouillon is the most common type of miliaria and results in red, 2-4 mm, non-follicular papules and papulovesicles  They are very itchy  Background erythema is often present  In children, miliaria affects the skin folds of the neck, axilla or groin  In adults, miliaria often affects the upper trunk, scalp, neck and flexures, particularly areas of friction with clothing  Philipp Taylor is a variant of milia rubra in which there are pustules   Miliaria profunda (tropical anhidrosis), the result of sweat leaking into the middle layer of skin (dermis); Miliaria profunda is rare and usually presents in adult males  It is a complication of repeated episodes of miliaria rubra  Miliaria profunda describes asymptomatic deep papules  The flesh-coloured, 1-3 mm diameter papules usually arise on the trunk and extremities  Management of miliaria requires heat and humidity to be controlled to reduce sweating and the avoidance of irritation to the skin  Strategies to avoid sweating and reduce irritation include the following:   Work or play in an air-conditioned environment for at least a few hours a day   Sleep in a ventilated, cool bedroom   Move away from a tropical climate   Avoid excessive clothing and tight clothing   Avoid excessive soap and irritants   Wear clothing made of breathable synthetic fabrics or cotton   Remove wet clothing      Treatment of miliaria may include:   Cool water compresses    Blowing air over the affected areas with a fan   Calamine lotion to relieve discomfort   Treatment of fever with antipyretic such as acetoaminophen/paracetamol   Mild topical steroids    Most cases of miliaria resolve within a day or two after changing to a cooler environment without any treatment or complications           Scribe Attestation    I,:  Dajuan Stark am acting as a scribe while in the presence of the attending physician :       I,:  Lisandra Lopez MD personally performed the services described in this documentation    as scribed in my presence :

## 2021-04-05 ENCOUNTER — OFFICE VISIT (OUTPATIENT)
Dept: URGENT CARE | Facility: CLINIC | Age: 71
End: 2021-04-05
Payer: MEDICARE

## 2021-04-05 VITALS
DIASTOLIC BLOOD PRESSURE: 85 MMHG | RESPIRATION RATE: 16 BRPM | HEIGHT: 70 IN | HEART RATE: 76 BPM | BODY MASS INDEX: 30.64 KG/M2 | OXYGEN SATURATION: 98 % | TEMPERATURE: 97.5 F | SYSTOLIC BLOOD PRESSURE: 152 MMHG | WEIGHT: 214 LBS

## 2021-04-05 DIAGNOSIS — Z51.89 VISIT FOR WOUND CHECK: Primary | ICD-10-CM

## 2021-04-05 PROCEDURE — 99212 OFFICE O/P EST SF 10 MIN: CPT | Performed by: PHYSICIAN ASSISTANT

## 2021-04-05 NOTE — PROGRESS NOTES
[facility]        NAME: Sundra Castleman is a 80 y o  female  : 1950    MRN: [id number]  DATE: 2021  TIME: 9:38 AM    Assessment and Plan   Visit for wound check [Z51 89]  1  Visit for wound check       Patient Instructions   S/p punch biopsy  Good granulation tissue, no signs of infection  Daily cleansing, antibiotic ointment and dressing change  F/u with dermatology if worsens    Follow up with PCP in 3-5 days  Proceed to  ER if symptoms worsen  Chief Complaint     Chief Complaint   Patient presents with    Wound Check     had punch biopsy on Wednesday and has more reddness and exudate at the surgical site         History of Present Illness       Margarita Escalona a 71-year-old female who presents to clinic for wound check  She states that she had a punch biopsy performed 4 days ago  She noticed 2 days ago that it was slightly warm and red and since then has gotten slightly worse  She denies any fever or chills  She noticed some clear fluid but has not noticed any purulent discharge  Review of Systems   Review of Systems   Constitutional: Negative for chills and fever  Skin: Positive for color change and wound       Current Medications       Current Outpatient Medications:     fosinopril (MONOPRIL) 40 mg tablet, Take 40 mg by mouth daily, Disp: , Rfl:     levothyroxine (Unithroid) 112 mcg tablet, Take 112 mcg by mouth daily, Disp: , Rfl:     PARoxetine (PAXIL) 20 mg tablet, Take 20 mg by mouth daily, Disp: , Rfl:     triamterene-hydrochlorothiazide (MAXZIDE-25) 37 5-25 mg per tablet, Take 1 tablet by mouth daily, Disp: , Rfl:     Current Allergies     Allergies as of 2021 - Reviewed 2021   Allergen Reaction Noted    Amoxicillin Swelling 2021            The following portions of the patient's history were reviewed and updated as appropriate: allergies, current medications, past family history, past medical history, past social history, past surgical history and problem list      Past Medical History:   Diagnosis Date    Cancer [facility])        Past Surgical History:   Procedure Laterality Date    HIP SURGERY Bilateral     REPLACEMENT TOTAL KNEE BILATERAL Left     SKIN BIOPSY         Family History   Problem Relation Age of Onset    No Known Problems Mother     Parkinsonism Father          Medications have been verified  Objective   /85   Pulse 76   Temp 97 5 °F (36 4 °C)   Resp 16   Ht 5' 10" (1 778 m)   Wt 97 1 kg (214 lb)   SpO2 98%   BMI 30 71 kg/m²   No LMP recorded  Patient is postmenopausal        Physical Exam     Physical Exam  Vitals signs and nursing note reviewed  Constitutional:       General: She is not in acute distress  Appearance: Normal appearance  She is not ill-appearing  Neck:     Cardiovascular:      Rate and Rhythm: Normal rate and regular rhythm  Heart sounds: Normal heart sounds  Pulmonary:      Effort: Pulmonary effort is normal       Breath sounds: Normal breath sounds  Neurological:      Mental Status: She is alert and oriented to person, place, and time     Psychiatric:         Mood and Affect: Mood normal          Behavior: Behavior normal

## 2021-04-05 NOTE — PATIENT INSTRUCTIONS
S/p punch biopsy  Good granulation tissue, no signs of infection  Daily cleansing, antibiotic ointment and dressing change  F/u with dermatology if worsens    Follow up with PCP in 3-5 days  Proceed to  ER if symptoms worsen

## 2021-04-06 NOTE — RESULT ENCOUNTER NOTE
Tissue Exam: [id number]  Order: [id number]  Status:  Final result   Visible to patient:  Yes ([facility] MyChart) Dx:  Neoplasm of uncertain behavior of skin  Component   Case Report  Surgical Pathology Report                         Case: [id number]                                   Authorizing Provider: Arnel Webber MD      Collected:           03/31/2021 0928              Ordering Location:     [facility]      Received:            03/31/2021 0928                                     [place]                                                                   Pathologist:           Puja Escamilla MD                                                           Specimen:    Skin, Other, A: left neck inferior                                                       Final Diagnosis  A  Skin, left neck inferior, punch biopsy:     Consistent with CHRONIC RADIATION DERMATITIS        Electronically signed by Puja Escamilla MD on 4/5/2021 at  1:41 PM        DERMATOPATHOLOGY RESULT NOTE    Results reviewed by ordering physician  Called patient to personally discuss results  Discussed results with patient         Instructions for Clinical Derm Team:   (remember to route Result Note to appropriate staff):    Call patient and schedule for Thursday morning laser clinic with Dr Eugene Purdy by Specimen:    Specimen A: benign  Plan: referral for 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

## 2021-04-07 ENCOUNTER — TELEPHONE (OUTPATIENT)
Dept: DERMATOLOGY | Facility: CLINIC | Age: 71
End: 2021-04-07

## 2021-04-22 ENCOUNTER — PROCEDURE VISIT (OUTPATIENT)
Dept: DERMATOLOGY | Facility: CLINIC | Age: 71
End: 2021-04-22
Payer: MEDICARE

## 2021-04-22 DIAGNOSIS — L90.5 SCAR TISSUE: Primary | ICD-10-CM

## 2021-04-22 PROCEDURE — REJUVRESURFFULL1 LASER FULL FACE 1 TX: Performed by: DERMATOLOGY

## 2021-04-22 NOTE — PATIENT INSTRUCTIONS
PULSED DYE LASER TREATMENT    The Pulsed Dye Laser (V-Beam) delivers intense but gentle pulses of light into selectively targeted areas of the skin  This laser is used to lighten or remove unwanted or abnormal blood vessels in the skin  The pulsed dye laser may be indicated for vascular lesions, broken blood vessels and generalized facial redness caused by rosacea, among other indications  The results of laser therapy vary on the condition that is being treated and the number of treatments required for clearance  There is no downtime however the treated area will show a reddish/bruised discoloration with swelling for usually no more than 48 hours after treatment  Post- Op Care Instructions:  1  Avoid direct sun exposure (for 3-4 days after the procedure) and use a sunscreen of SPF 30 or higher  2  Avoid exercise, hot tubs, saunas, spicy foods for approximately 24 hours after procedure  This will increase the blood flow to the area and make the treatment less effective  3  Do not drink alcohol, take aspirin, ibuprofen, vitamin E, ginseng, gingko biloba, turmeric or fish oils for 3 days before and 3 days after your laser treatment  Using these may increase your likelihood of bruising  4  Tylenol and ice packs may be used for swelling, bruising or discomfort  5  Vaseline should be applied if there is crusting/scabbing  DO NOT pick or scratch the area; let the scab fall off on its own  6  Do not use scrubs, harsh exfoliants to the treated area for one week  Side effects: The treated area may show mild swelling, reddish/bruised discoloration  and redness  Occasionally purpura, laser bruise, may occur; however it will disappear in a few days  The treated area is delicate and should be treated with care  Keep icing the area for swelling  For facial swelling, sleep with your head elevated       When any discoloration or bruising clears, there may be very little change in the treated area   Improvement will take place slowly over a period of weeks   Continue with your set of treatments as discussed for optimal results

## 2021-04-22 NOTE — PROGRESS NOTES
[address] [facility] Laser Treatment Note    Patient Name: Dahlia Newton  Encounter Date: 4/22/21    Today's Chief Concerns:   Concern #1:  Laser Treatment:  DESTRUCTION OF BENIGN LESION      4/22/2021  Vidhi PRIMA (Flashlamp Pulsed-Dye and Long Pulsed ND:Yag Laser) Treatment     PROCEDURE: Flashlamp Pulsed-Dye Laser Treatment  Place of Surgery: [facility]  Surgeon and : Foster Alejandre  Assistant: Uzair Castro     Anesthetic: None; area is insensate     Safety Precautions:  Fire extinguisher present/Window covered/Staff and patient eyes covered/Warning sign posted     Treatment number: 1  Interim History/Comments:    Percent lightening:   Growth Phase:      Pre-operative Diagnosis: 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  Indications for Surgery:  Functional range of motion limitation  Post-operative Diagnosis: same as pre-operative     Parameters: The V Beam laser was set to 595nm wavelength  The cooling device was set to 30 msec duration/20 msec delay;   10 mm spot; 6 msec; 7 J     Procedure Note:  After obtaining appropriate consent, patient was brought back to the operating room  Time out was performed  Patient's name, identification and intended procedure were verified  Eye coverings eye shield inserted/placed  LEFT NECK     Tolerance: Well-tolerated  Complications: None  Estimated Blood Loss:  0 cc   Other procedures:    Photography: YES     Findings and plans were discussed with the family  Post-op Care:  Aquaphor "24/7 x 2 weeks" and sun protection  Disposition:  Discharged to home  Follow-up:  RTL: 4 weeks    PATIENT'S AFTER-CARE INSTRUCTIONS:     Swelling may occur after the laser treatment  This may last for several days  A cool washcloth or ice pack can be applied if it helps him/her feel better   Bruising or purplish discoloration may be present for up to 2 weeks in the treated area   Keep the area moist with topical Aquaphor or petroleum jelly until the bruising resolves   Blistering is rare  If this occurs, generously apply bacitracin ointment until complete healing occurs   Encourage your child to rest and avoid activities that could result in injury to the treated skin   Your child can take a bath or shower the day after the procedure  Avoid rubbing or scratching the treated area   Avoid sun exposure after and between laser treatments  Sun exposure may cause pigmentation changes in the treated areas  In addition, sun exposure can darken and worsen red birthmarks   Tylenol may be given for any post-operative discomfort (pain is usually minimal)  If severe pain occurs, call our office at [phone] (SKIN); after hours or on the weekends, you will be connected to our on-call dermatology service  CC: Flower Galarza MD      UltraPulse Fractional Carbon Dioxide (CO2) Laser Resurfacing    How does fractional laser skin resurfacing work? The use of a fractional laser with ablative settings delivers many narrow columns of laser light to the skin  This induces the formation of many zones of thermal damage referred to as microscopic thermal zones (MTZs)  The technique allows undamaged skin surrounding the MTZs to serve as a reservoir for tissue to regenerate faster than traditional ablative lasers  Complications, as seen below, appear to be less severe and less frequent with the fractional laser resurfacing  What can be expected during and after laser skin resurfacing? In general, all forms of laser resurfacing discussed are performed on an outpatient basis, using local anesthesia in combination with orally or intravenously administered sedative medications  Wrinkles around the eyes, mouth, or forehead may be treated individually, or a full-face laserabrasion may be performed  Here is what to expect during and after resurfacing:  · Areas of the face to be treated are numbed with a topical and/or local anesthetic     · General anesthesia may be used when large areas of the skin are treated  · Most laser procedures take 30 to 45 minutes; larger procedures may take 1-1/2 to 2 hours  How should I prepare for laser skin resurfacing? · Avoid tanning or heavy sun exposure and use a broad-spectrum sunscreen daily for 4 weeks prior to treatment  · Avoid deep facial peel procedures for 4 weeks prior to the treatment (for example, aggressive chemical peels, laser resurfacing, dermabrasion)  · Do not use medications that cause photosensitivity (such as doxycycline, minocycline) for at least 72 hours prior to treatment  · If you have a history of herpes (oral cold sores, genital) or shingles in the treatment area, let your doctor know and start your antiviral medication (valacyclovir, acyclovir) as directed (usually 2 days prior to treatment and continue for 3 days after treatment)  What is recovery like AFTER the procedure? · Recovery time: Allow 1 full week  · Following laser resurfacing, a nonstick dressing is applied to the treatment sites for 24 hours  · The patient then cleans the treated areas 2 to 5 times a day with saline or a diluted vinegar solution  Prepare the vinegar solution by mixing 1 capful of white distilled vinegar with 1 LITER of fresh water; shake the mixture thoroughly; apply the mixture to clean gauze or washcloth and allow it to lightly and gently soak on the wound for 5 minutes; repeat 3 TIMES A DAY for at least 7 days straight  · An ointment such as Vaseline® petrolatum or Aquaphor® is applied and should cover the treated area "24 hours a day, 7 days a week" for up to 3 weeks  This wound care is intended to prevent any scab formation  In general, the areas heal in 5 to 21 days, depending on the nature of the condition that was treated and type of laser used  · Once the areas have healed, makeup may be worn to camouflage the pink to red color that is generally seen after laser skin resurfacing   Green-based makeups are particularly suitable for this camouflage since they neutralize the red color  Oil-free makeups are recommended after laser resurfacing  The redness in the laser-treated sites generally fades in 2 to 3 months but may take as long as 6 months to disappear  The redness generally persists longer in blondes and redheads  Patients with darker skin tones have a greater risk of healing with darker pigmentation (hyperpigmentation)  This may be minimized by use of a bleaching agent after laser skin resurfacing  PROCEDURE NOTE  4/22/2021  Ultrapulse Fractional C)2 Laser Treatment     PROCEDURE: Ultrapulse Fractional C)2 Laser Treatment  Place of Surgery: [facility]  Surgeon and : Gildardo Rosario  Assistant:      Anesthetic: None; area is insensate     Safety Precautions:  Fire extinguisher present/Window covered/Staff and patient eyes covered/Warning sign posted     Treatment number: TEST SPOT  Interim History/Comments:    Percent improvement:      Pre-operative Diagnosis: As above  Indications for Surgery:  As above  Post-operative Diagnosis: same as pre-operative     Parameters: The UltraPulse laser is fixed at the 10,600 nm wavelength  Parameters were as follows:    50 mJ; 5% density; 150 Hertz     Procedure Note:  After obtaining appropriate consent, patient was brought back to the operating room  Time out was performed  Patient's name, identification and intended procedure were verified  Eye coverings eye shield inserted/placed  SITE OF SURGERY: LEFT NECK     Tolerance: well-tolerated; no issues  Complications: None  Estimated Blood Loss:  0 cc   Other procedures:   Photography: YES     Findings and plans were discussed with the family    Post-op Care:  As above  Disposition:  Discharged to home  Follow-up:  Return for laser in 4-6 weeks; avoid sun

## 2021-04-29 ENCOUNTER — EVALUATION (OUTPATIENT)
Dept: PHYSICAL THERAPY | Facility: CLINIC | Age: 71
End: 2021-04-29
Payer: MEDICARE

## 2021-04-29 DIAGNOSIS — Z85.818 HISTORY OF PAROTID CANCER: ICD-10-CM

## 2021-04-29 DIAGNOSIS — L90.5 SCAR TISSUE: Primary | ICD-10-CM

## 2021-04-29 PROCEDURE — 97162 PT EVAL MOD COMPLEX 30 MIN: CPT | Performed by: PHYSICAL THERAPIST

## 2021-04-29 PROCEDURE — 97110 THERAPEUTIC EXERCISES: CPT | Performed by: PHYSICAL THERAPIST

## 2021-04-29 NOTE — PROGRESS NOTES
PT Evaluation  and PT Discharge    Today's date: 2021  Patient name: Erwin Morin  : 1950  MRN: [id number]  Referring provider: Kaitlin Conley MD  Dx:   Encounter Diagnosis     ICD-10-CM    1  Scar tissue  L90 5 Ambulatory referral to pt/ot functional capacity evaluation   2  History of parotid cancer  Z85 818      PT in contact with Dr Merle Brown in regards to patient being scheduled for scarring procedure  Patient has not had any appointments for scar work by dermatologist, patient discharged at this time, encouraged to return to OP PT in near future  Start Time: 1100  Stop Time: 1140  Total time in clinic (min): 40 minutes    Assessment  Assessment details: Erwin Morin is a 80y o  year old female who presents to [facility] with:   Scar tissue  (primary encounter diagnosis)  History of parotid cancer    Bing Fishman presents with the impairments as listed above subsequent to parotid surgery with lymph node removal, adjuvant chemotherapy and radiation  Patient demonstrating significant scarring along L anterior and lateral throat and neck demonstrating significant limitations in ROM in all planes, especially limited with extension and lateral rotation bilaterally  She would benefit from Physical Therapy to address these impairments and to maximize function  PT will be working in conjunction with Dr Merle Brown having PT sessions same day following laser scar procedure performed  Impairments: abnormal muscle tone, abnormal or restricted ROM, activity intolerance, impaired physical strength, lacks appropriate home exercise program, pain with function and poor posture   Functional limitations: reaching, lifting, driving, lateral rotation of the headBarriers to therapy: PMH parotid cancer with adjuvant chemotherapy and radiation, HTN   Understanding of Dx/Px/POC: good   Prognosis: good    Goals  Short-Term Goals:   1  Patient will increased ROM by 10 degrees in 4 weeks  2   Patient will demonstrate improved skin and scar mobility in 6 weeks  3  Patient will demonstrate reduction in swelling in submental region in 6 weeks     Long-Term Goals:   1  Patient will demonstrate symmetrical cervical lateral rotation in order to be able to check blind spot while driving at time of discharge  2  Patient independent with HEP at time of discharge  3  Patient will be independent with self-MLD and scar massage at time of discharge  Plan  Plan details: Thank you for referring Ivy Simons to Physical Therapy at [place] [address] and for the opportunity to coordinate care  Patient would benefit from: PT marceal and skilled PT  Planned modality interventions: thermotherapy: hydrocollator packs and unattended electrical stimulation  Planned therapy interventions: therapeutic exercise, therapeutic activities, stretching, strengthening, postural training, patient education, home exercise program, IADL retraining, joint mobilization, manual therapy and neuromuscular re-education  Frequency: 2x week  Duration in visits: 10  Plan of Care beginning date: 4/29/2021  Treatment plan discussed with: patient        Subjective Evaluation    History of Present Illness  Mechanism of injury: Brooke Mehta is a 80 y o  female who presents to  with diagnosis of parotid gland cancer       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  Surgery: yes; how many days in hospital: 1 day ; complications: None; incision: Present and scarring    Node removal: yes; 50 nodes removed  Radiation: yes ; dosage: 30 rounds of 8 minutes; side effects: hearing loss, sensation loss   Chemotherapy currently: no   Chemotherapy drug: Yes ; Chemotherapy regimen: Unsure of name and dosage   Side effects from chemo: yes; hearing loss   Swelling: yes    Fatigue: no    Neuropathy: yes; sensation loss along L side of face and neck   Dizziness: no   Recent falls or near falls: no   Appetite and/or taste changes: no   Nausea/ vomiting: no   GI changes: no   Change in bowel/ bladder habits: no Joint pain: no   Memory changes: no     Orthopedic history: L TKA, bilateral HEATHER, chronic R knee pain   Cardiovascular history: HTN, goiter with thyroid and has follow-up with endocrinologist   Patient having scar treatments performed by Dr Merle Brown, PT will be performed in conjunction with scar laser treatments, sessions will be performed on the same day  Pain  Current pain ratin  At best pain ratin  Location: cervical spine   Aggravating factors: walking, stair climbing and standing    Patient Goals  Patient goals for therapy: return to sport/leisure activities, independence with ADLs/IADLs, increased strength, increased motion and decreased edema          Objective     Postural Observations  Seated posture: poor  Standing posture: poor    Additional Postural Observation Details  Patient demonstrates increased thoracic kyphosis and cervical protrusion, rounded shoulders  Evident scarring along L SCM and inferior to L ear, redness present along L clavicle    Palpation   Left   Hypertonic in the scalenes, suboccipitals and upper trapezius  Tenderness of the suboccipitals and upper trapezius  Trigger point to suboccipitals and upper trapezius       Neurological Testing     Sensation   Cervical/Thoracic   Left   Diminished: light touch    Right   Intact: light touch    Active Range of Motion   Cervical/Thoracic Spine       Cervical    Flexion: 30 degrees   Extension: 10 degrees      Left lateral flexion: 10 degrees      Right lateral flexion: 10 degrees      Left rotation: 35 degrees  Right rotation: 35 degrees             Strength/Myotome Testing   Cervical Spine     Left   Neck lateral flexion (C3): WFL    Right   Neck lateral flexion (C3): WFL    Left Shoulder     Planes of Motion   Flexion: 4   Extension: 4 and WFL   Abduction: 4 and WFL   Adduction: WFL   External rotation at 0°: 4   Internal rotation at 0°: 4     Right Shoulder     Planes of Motion   Flexion: 4   Extension: 4 and WFL Abduction: 4 and WFL   Adduction: WFL   External rotation at 0°: 4   Internal rotation at 0°: 4     Left Elbow   Flexion: 4 and WFL  Extension: 4    Right Elbow   Flexion: 4 and WFL  Extension: 4    Left Wrist/Hand   Wrist extension: 4 and WFL  Wrist flexion: 4 and WFL  Radial deviation: WFL    Right Wrist/Hand   Wrist extension: 4 and WFL  Wrist flexion: 4 and WFL  Radial deviation: WFL    General Comments:      Cervical/Thoracic Comments  IE 04/29/21   Body part  Chin : 40 cm    Laryngeal prominence: 34 5 cm  Shirt collar: 35 cm  Clavicular angle to acromion L: 12 cm, R 11 cm  Submental to medial clavicle L: 10 cm, R 12 cm                           Precautions: PMH parotid cancer  HEP: review educational literature provided by PT, order short stretch bandages as highlighted by PT   Daily Treatment Diary     Manuals 4/29            Cervical MLD sequence                                       Therapeutic Exercise             UT stretch :10x3 ea            Levator stretch             SCM stretch :10x3 ea            Scapular retraction                                        Neuro Re-ed             TB low rows             TB rows             Chin tucks             DNF             "Yikes face"                                       Self-care/ Home management             Lymphedema education

## 2021-07-19 ENCOUNTER — TELEPHONE (OUTPATIENT)
Dept: DERMATOLOGY | Facility: CLINIC | Age: 71
End: 2021-07-19

## 2021-07-19 NOTE — TELEPHONE ENCOUNTER
Called pt to schedule a laser appt with Dr Hoa Mar early Aug 5th  LVM to call the office back  Appt must be earlier in the morning to allow for follow PT with Dr Maggie Hatchet

## 2021-08-09 ENCOUNTER — OFFICE VISIT (OUTPATIENT)
Dept: FAMILY MEDICINE CLINIC | Facility: CLINIC | Age: 71
End: 2021-08-09
Payer: MEDICARE

## 2021-08-09 VITALS
HEIGHT: 68 IN | TEMPERATURE: 97.5 F | SYSTOLIC BLOOD PRESSURE: 110 MMHG | OXYGEN SATURATION: 92 % | HEART RATE: 82 BPM | WEIGHT: 202 LBS | RESPIRATION RATE: 16 BRPM | DIASTOLIC BLOOD PRESSURE: 70 MMHG | BODY MASS INDEX: 30.62 KG/M2

## 2021-08-09 DIAGNOSIS — C07 CANCER OF PAROTID GLAND (HCC): ICD-10-CM

## 2021-08-09 DIAGNOSIS — F41.9 ANXIETY: ICD-10-CM

## 2021-08-09 DIAGNOSIS — E03.8 OTHER SPECIFIED HYPOTHYROIDISM: ICD-10-CM

## 2021-08-09 DIAGNOSIS — I10 ESSENTIAL HYPERTENSION: Primary | ICD-10-CM

## 2021-08-09 PROBLEM — H81.03 MENIERE'S DISEASE OF BOTH EARS: Status: ACTIVE | Noted: 2021-08-09

## 2021-08-09 PROCEDURE — 99214 OFFICE O/P EST MOD 30 MIN: CPT | Performed by: INTERNAL MEDICINE

## 2021-08-09 RX ORDER — TRIAMTERENE AND HYDROCHLOROTHIAZIDE 37.5; 25 MG/1; MG/1
1 CAPSULE ORAL EVERY MORNING
Qty: 90 CAPSULE | Refills: 3 | Status: SHIPPED | OUTPATIENT
Start: 2021-08-09

## 2021-08-09 RX ORDER — FOSINOPRIL SODIUM 40 MG/1
40 TABLET ORAL DAILY
Qty: 90 TABLET | Refills: 3 | Status: SHIPPED | OUTPATIENT
Start: 2021-08-09 | End: 2022-04-11

## 2021-08-09 RX ORDER — TRIAMTERENE AND HYDROCHLOROTHIAZIDE 37.5; 25 MG/1; MG/1
CAPSULE ORAL
COMMUNITY
Start: 2021-06-18 | End: 2021-08-09 | Stop reason: SDUPTHER

## 2021-08-09 NOTE — ASSESSMENT & PLAN NOTE
She is clinically euthyroid and brings in labwork just done by her oncologist   TSH and free T4 are within goal and she will continue levothyroxine at current dose

## 2021-08-09 NOTE — ASSESSMENT & PLAN NOTE
She continues to see oncology at [facility] in [facility] and denies recurrence per patient

## 2021-08-09 NOTE — PROGRESS NOTES
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  Had surgery and XRT x 30 treatments as well as chemo  Doing well since  Also seeing Dr Cordova Hair due to scarring  Has anxiety since her diagnosis and has been taking paroxetine since  Feels she is doing well with this and her mood is well controlled, does not feel the need to change anything at this time  Has history of Meniere's and hypertension, hypothyroidism  Sees endocrinology  Denies history of heart disease, DM  No complaints today  The following portions of the patient's history were reviewed and updated as appropriate: allergies, current medications, past family history, past medical history, past social history, past surgical history and problem list     Review of Systems   Constitutional: Negative  Respiratory: Negative  Cardiovascular: Negative  Endocrine: Negative  Psychiatric/Behavioral: Negative  Current Outpatient Medications   Medication Sig Dispense Refill    fosinopril (MONOPRIL) 40 mg tablet Take 40 mg by mouth daily      levothyroxine (Unithroid) 112 mcg tablet Take 112 mcg by mouth daily      PARoxetine (PAXIL) 20 mg tablet Take 20 mg by mouth daily      triamterene-hydrochlorothiazide (MAXZIDE-25) 37 5-25 mg per tablet Take 1 tablet by mouth daily       No current facility-administered medications for this visit  Objective:     There were no vitals taken for this visit  Physical Exam  Constitutional:       Appearance: Normal appearance  She is well-developed  Eyes:      Conjunctiva/sclera: Conjunctivae normal    Neck:      Thyroid: No thyromegaly  Vascular: No JVD  Cardiovascular:      Rate and Rhythm: Normal rate and regular rhythm  Heart sounds: Normal heart sounds  No murmur heard  No friction rub  No gallop  Pulmonary:      Effort: Pulmonary effort is normal       Breath sounds: Normal breath sounds  No wheezing or rales  Abdominal:      General: Bowel sounds are normal  There is no distension  Palpations: Abdomen is soft  Tenderness: There is no abdominal tenderness  Musculoskeletal:      Cervical back: Neck supple  Neurological:      Mental Status: She is alert  Psychiatric:         Mood and Affect: Mood normal          Behavior: Behavior normal          Thought Content:  Thought content normal          Judgment: Judgment normal                 Bentley Hernandez MD

## 2022-02-07 DIAGNOSIS — Z12.31 ENCOUNTER FOR SCREENING MAMMOGRAM FOR MALIGNANT NEOPLASM OF BREAST: Primary | ICD-10-CM

## 2022-02-09 ENCOUNTER — TELEPHONE (OUTPATIENT)
Dept: ADMINISTRATIVE | Facility: OTHER | Age: 72
End: 2022-02-09

## 2022-02-09 ENCOUNTER — OFFICE VISIT (OUTPATIENT)
Dept: FAMILY MEDICINE CLINIC | Facility: CLINIC | Age: 72
End: 2022-02-09
Payer: MEDICARE

## 2022-02-09 VITALS
SYSTOLIC BLOOD PRESSURE: 126 MMHG | WEIGHT: 208 LBS | BODY MASS INDEX: 31.52 KG/M2 | DIASTOLIC BLOOD PRESSURE: 70 MMHG | RESPIRATION RATE: 16 BRPM | TEMPERATURE: 97.3 F | HEIGHT: 68 IN | HEART RATE: 88 BPM

## 2022-02-09 DIAGNOSIS — Z00.00 MEDICARE ANNUAL WELLNESS VISIT, SUBSEQUENT: Primary | ICD-10-CM

## 2022-02-09 DIAGNOSIS — Z78.0 MENOPAUSE: ICD-10-CM

## 2022-02-09 DIAGNOSIS — I10 ESSENTIAL HYPERTENSION: ICD-10-CM

## 2022-02-09 DIAGNOSIS — E03.8 OTHER SPECIFIED HYPOTHYROIDISM: ICD-10-CM

## 2022-02-09 DIAGNOSIS — F41.9 ANXIETY: ICD-10-CM

## 2022-02-09 DIAGNOSIS — C07 CANCER OF PAROTID GLAND (HCC): ICD-10-CM

## 2022-02-09 PROCEDURE — G0439 PPPS, SUBSEQ VISIT: HCPCS | Performed by: INTERNAL MEDICINE

## 2022-02-09 PROCEDURE — 99214 OFFICE O/P EST MOD 30 MIN: CPT | Performed by: INTERNAL MEDICINE

## 2022-02-09 PROCEDURE — 1123F ACP DISCUSS/DSCN MKR DOCD: CPT | Performed by: INTERNAL MEDICINE

## 2022-02-09 NOTE — ASSESSMENT & PLAN NOTE
Clinically euthyroid and managed by endocrinology  Asked patient to call sooner than next scheduled appointment for any complaints or issues

## 2022-02-09 NOTE — ASSESSMENT & PLAN NOTE
By history, is 11 years cancer free, continues to follow with her oncologist in [address] [place] [address]

## 2022-02-09 NOTE — TELEPHONE ENCOUNTER
----- Message from Moise Abreu MD sent at 2/9/2022  9:45 AM EST -----  Please call [facility] for mammogram

## 2022-02-09 NOTE — PATIENT INSTRUCTIONS
Medicare Preventive Visit Patient Instructions  Thank you for completing your Welcome to Medicare Visit or Medicare Annual Wellness Visit today  Your next wellness visit will be due in one year (2/10/2023)  The screening/preventive services that you may require over the next 5-10 years are detailed below  Some tests may not apply to you based off risk factors and/or age  Screening tests ordered at today's visit but not completed yet may show as past due  Also, please note that scanned in results may not display below  Preventive Screenings:  Service Recommendations Previous Testing/Comments   Colorectal Cancer Screening  * Colonoscopy    * Fecal Occult Blood Test (FOBT)/Fecal Immunochemical Test (FIT)  * Fecal DNA/Cologuard Test  * Flexible Sigmoidoscopy Age: 54-65 years old   Colonoscopy: every 10 years (may be performed more frequently if at higher risk)  OR  FOBT/FIT: every 1 year  OR  Cologuard: every 3 years  OR  Sigmoidoscopy: every 5 years  Screening may be recommended earlier than age 48 if at higher risk for colorectal cancer  Also, an individualized decision between you and your healthcare provider will decide whether screening between the ages of 74-80 would be appropriate  Colonoscopy: 01/06/2020  FOBT/FIT: Not on file  Cologuard: Not on file  Sigmoidoscopy: Not on file    Screening Current     Breast Cancer Screening Age: 36 years old  Frequency: every 1-2 years  Not required if history of left and right mastectomy Mammogram: Not on file        Cervical Cancer Screening Between the ages of 21-29, pap smear recommended once every 3 years  Between the ages of 33-67, can perform pap smear with HPV co-testing every 5 years     Recommendations may differ for women with a history of total hysterectomy, cervical cancer, or abnormal pap smears in past  Pap Smear: Not on file    Screening Not Indicated   Hepatitis C Screening Once for adults born between 1945 and 1965  More frequently in patients at high risk for Hepatitis C Hep C Antibody: Not on file        Diabetes Screening 1-2 times per year if you're at risk for diabetes or have pre-diabetes Fasting glucose: No results in last 5 years   A1C: No results in last 5 years        Cholesterol Screening Once every 5 years if you don't have a lipid disorder  May order more often based on risk factors  Lipid panel: Not on file          Other Preventive Screenings Covered by Medicare:  1  Abdominal Aortic Aneurysm (AAA) Screening: covered once if your at risk  You're considered to be at risk if you have a family history of AAA  2  Lung Cancer Screening: covers low dose CT scan once per year if you meet all of the following conditions: (1) Age 50-69; (2) No signs or symptoms of lung cancer; (3) Current smoker or have quit smoking within the last 15 years; (4) You have a tobacco smoking history of at least 30 pack years (packs per day multiplied by number of years you smoked); (5) You get a written order from a healthcare provider  3  Glaucoma Screening: covered annually if you're considered high risk: (1) You have diabetes OR (2) Family history of glaucoma OR (3)  aged 48 and older OR (3)  American aged 72 and older  3  Osteoporosis Screening: covered every 2 years if you meet one of the following conditions: (1) You're estrogen deficient and at risk for osteoporosis based off medical history and other findings; (2) Have a vertebral abnormality; (3) On glucocorticoid therapy for more than 3 months; (4) Have primary hyperparathyroidism; (5) On osteoporosis medications and need to assess response to drug therapy  · Last bone density test (DXA Scan): Not on file  5  HIV Screening: covered annually if you're between the age of 12-76  Also covered annually if you are younger than 13 and older than 72 with risk factors for HIV infection  For pregnant patients, it is covered up to 3 times per pregnancy      Immunizations:  Immunization Recommendations   Influenza Vaccine Annual influenza vaccination during flu season is recommended for all persons aged >= 6 months who do not have contraindications   Pneumococcal Vaccine (Prevnar and Pneumovax)  * Prevnar = PCV13  * Pneumovax = PPSV23   Adults 25-60 years old: 1-3 doses may be recommended based on certain risk factors  Adults 72 years old: Prevnar (PCV13) vaccine recommended followed by Pneumovax (PPSV23) vaccine  If already received PPSV23 since turning 65, then PCV13 recommended at least one year after PPSV23 dose  Hepatitis B Vaccine 3 dose series if at intermediate or high risk (ex: diabetes, end stage renal disease, liver disease)   Tetanus (Td) Vaccine - COST NOT COVERED BY MEDICARE PART B Following completion of primary series, a booster dose should be given every 10 years to maintain immunity against tetanus  Td may also be given as tetanus wound prophylaxis  Tdap Vaccine - COST NOT COVERED BY MEDICARE PART B Recommended at least once for all adults  For pregnant patients, recommended with each pregnancy  Shingles Vaccine (Shingrix) - COST NOT COVERED BY MEDICARE PART B  2 shot series recommended in those aged 48 and above     Health Maintenance Due:      Topic Date Due    Hepatitis C Screening  Never done    Breast Cancer Screening: Mammogram  Never done    Colorectal Cancer Screening  Never done     Immunizations Due:      Topic Date Due    DTaP,Tdap,and Td Vaccines (1 - Tdap) Never done    Pneumococcal Vaccine: 65+ Years (1 of 1 - PPSV23) Never done    Influenza Vaccine (1) Never done    COVID-19 Vaccine (3 - Booster for [address] series) 09/13/2021     Advance Directives   What are advance directives? Advance directives are legal documents that state your wishes and plans for medical care  These plans are made ahead of time in case you lose your ability to make decisions for yourself   Advance directives can apply to any medical decision, such as the treatments you want, and if you want to donate organs  What are the types of advance directives? There are many types of advance directives, and each state has rules about how to use them  You may choose a combination of any of the following:  · Living will: This is a written record of the treatment you want  You can also choose which treatments you do not want, which to limit, and which to stop at a certain time  This includes surgery, medicine, IV fluid, and tube feedings  · Durable power of  for healthcare [facility]): This is a written record that states who you want to make healthcare choices for you when you are unable to make them for yourself  This person, called a proxy, is usually a family member or a friend  You may choose more than 1 proxy  · Do not resuscitate (DNR) order:  A DNR order is used in case your heart stops beating or you stop breathing  It is a request not to have certain forms of treatment, such as CPR  A DNR order may be included in other types of advance directives  · Medical directive: This covers the care that you want if you are in a coma, near death, or unable to make decisions for yourself  You can list the treatments you want for each condition  Treatment may include pain medicine, surgery, blood transfusions, dialysis, IV or tube feedings, and a ventilator (breathing machine)  · Values history: This document has questions about your views, beliefs, and how you feel and think about life  This information can help others choose the care that you would choose  Why are advance directives important? An advance directive helps you control your care  Although spoken wishes may be used, it is better to have your wishes written down  Spoken wishes can be misunderstood, or not followed  Treatments may be given even if you do not want them  An advance directive may make it easier for your family to make difficult choices about your care     Weight Management   Why it is important to manage your weight:  Being overweight increases your risk of health conditions such as heart disease, high blood pressure, type 2 diabetes, and certain types of cancer  It can also increase your risk for osteoarthritis, sleep apnea, and other respiratory problems  Aim for a slow, steady weight loss  Even a small amount of weight loss can lower your risk of health problems  How to lose weight safely:  A safe and healthy way to lose weight is to eat fewer calories and get regular exercise  You can lose up about 1 pound a week by decreasing the number of calories you eat by 500 calories each day  Healthy meal plan for weight management:  A healthy meal plan includes a variety of foods, contains fewer calories, and helps you stay healthy  A healthy meal plan includes the following:  · Eat whole-grain foods more often  A healthy meal plan should contain fiber  Fiber is the part of grains, fruits, and vegetables that is not broken down by your body  Whole-grain foods are healthy and provide extra fiber in your diet  Some examples of whole-grain foods are whole-wheat breads and pastas, oatmeal, brown rice, and bulgur  · Eat a variety of vegetables every day  Include dark, leafy greens such as spinach, kale, leyda greens, and mustard greens  Eat yellow and orange vegetables such as carrots, sweet potatoes, and winter squash  · Eat a variety of fruits every day  Choose fresh or canned fruit (canned in its own juice or light syrup) instead of juice  Fruit juice has very little or no fiber  · Eat low-fat dairy foods  Drink fat-free (skim) milk or 1% milk  Eat fat-free yogurt and low-fat cottage cheese  Try low-fat cheeses such as mozzarella and other reduced-fat cheeses  · Choose meat and other protein foods that are low in fat  Choose beans or other legumes such as split peas or lentils  Choose fish, skinless poultry (chicken or turkey), or lean cuts of red meat (beef or pork)   Before you cook meat or poultry, cut off any visible fat  · Use less fat and oil  Try baking foods instead of frying them  Add less fat, such as margarine, sour cream, regular salad dressing and mayonnaise to foods  Eat fewer high-fat foods  Some examples of high-fat foods include french fries, doughnuts, ice cream, and cakes  · Eat fewer sweets  Limit foods and drinks that are high in sugar  This includes candy, cookies, regular soda, and sweetened drinks  Exercise:  Exercise at least 30 minutes per day on most days of the week  Some examples of exercise include walking, biking, dancing, and swimming  You can also fit in more physical activity by taking the stairs instead of the elevator or parking farther away from stores  Ask your healthcare provider about the best exercise plan for you  © Copyright [employer] 2018 Information is for End User's use only and may not be sold, redistributed or otherwise used for commercial purposes   All illustrations and images included in CareNotes® are the copyrighted property of [employer]  or [address] [place] [address]

## 2022-02-09 NOTE — ASSESSMENT & PLAN NOTE
This is well controlled on dyazide and fosinopril  Will continue  Encouraged daily physical activity  Will check labs prior to follow up visit in 6 months

## 2022-02-09 NOTE — PROGRESS NOTES
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[facility])       Endocrine    Other specified hypothyroidism       Cardiovascular and Mediastinum    Essential hypertension      Other Visit Diagnoses     Medicare annual wellness visit, subsequent    -  Primary           Preventive health issues were discussed with patient, and age appropriate screening tests were ordered as noted in patient's After Visit Summary  Personalized health advice and appropriate referrals for health education or preventive services given if needed, as noted in patient's After Visit Summary       History of Present Illness:     Patient presents for Medicare Annual Wellness visit    Patient Care Team:  Robbie Pabon MD as PCP - General (Internal Medicine)     Problem List:     Patient Active Problem List   Diagnosis    Essential hypertension    Other specified hypothyroidism    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([address] )    Meniere's disease of both ears    Anxiety      Past Medical and Surgical History:     Past Medical History:   Diagnosis Date    Cancer [facility])      Past Surgical History:   Procedure Laterality Date    HIP SURGERY Bilateral     HYSTERECTOMY      PAROTIDECTOMY      REPLACEMENT TOTAL KNEE BILATERAL Left     SKIN BIOPSY        Family History:     Family History   Problem Relation Age of Onset    No Known Problems Mother     Parkinsonism Father     Dementia Maternal Grandmother     Diabetes Sister       Social History:     Social History     Socioeconomic History    Marital status: /Civil Union     Spouse name: Not on file    Number of children: Not on file    Years of education: Not on file    Highest education level: Not on file   Occupational History    Not on file   Tobacco Use    Smoking status: Never Smoker    Smokeless tobacco: Never Used   Vaping Use    Vaping Use: Never used   Substance and Sexual Activity    Alcohol use: Not Currently    Drug use: Never    Sexual activity: Not on file   Other Topics Concern    Not on file   Social History Narrative    Not on file     Social Determinants of Health     Financial Resource Strain: Not on file   Food Insecurity: Not on file   Transportation Needs: Not on file   Physical Activity: Not on file   Stress: Not on file   Social Connections: Not on file   Intimate Partner Violence: Not on file   Housing Stability: Not on file      Medications and Allergies:     Current Outpatient Medications   Medication Sig Dispense Refill    fosinopril (MONOPRIL) 40 mg tablet Take 1 tablet (40 mg total) by mouth daily 90 tablet 3    levothyroxine (Unithroid) 112 mcg tablet Take 112 mcg by mouth daily      PARoxetine (PAXIL) 20 mg tablet Take 20 mg by mouth daily      triamterene-hydrochlorothiazide (DYAZIDE) 37 5-25 mg per capsule Take 1 capsule by mouth every morning 37 5-25 mg take 1 tab daily by mouth 90 capsule 3     No current facility-administered medications for this visit  Allergies   Allergen Reactions    Amoxicillin Swelling      Immunizations:     Immunization History   Administered Date(s) Administered    COVID-19 MODERNA VACC 0 5 ML IM 03/11/2021, 04/13/2021      Health Maintenance:         Topic Date Due    Hepatitis C Screening  Never done    Breast Cancer Screening: Mammogram  Never done    Colorectal Cancer Screening  Never done         Topic Date Due    DTaP,Tdap,and Td Vaccines (1 - Tdap) Never done    Pneumococcal Vaccine: 65+ Years (1 of 1 - PPSV23) Never done    Influenza Vaccine (1) Never done    COVID-19 Vaccine (3 - Booster for Michelle Kuster series) 09/13/2021      Medicare Health Risk Assessment:     Ht 5' 8" (1 727 m)   BMI 30 71 kg/m²      Stephen Grimm is here for her Subsequent Wellness visit  Health Risk Assessment:   Patient rates overall health as excellent  Patient feels that their physical health rating is same  Patient is very satisfied with their life  Eyesight was rated as same  Hearing was rated as same  Patient feels that their emotional and mental health rating is same  Patients states they are never, rarely angry  Patient states they are never, rarely unusually tired/fatigued  Pain experienced in the last 7 days has been none  Patient states that she has experienced no weight loss or gain in last 6 months  Depression Screening:   PHQ-2 Score: 0      Fall Risk Screening: In the past year, patient has experienced: no history of falling in past year      Urinary Incontinence Screening:   Patient has not leaked urine accidently in the last six months  Home Safety:  Patient does not have trouble with stairs inside or outside of their home  Patient has working smoke alarms and has working carbon monoxide detector  Home safety hazards include: none  Nutrition:   Current diet is Regular  Medications:   Patient is currently taking over-the-counter supplements  OTC medications include: see medication list  Patient is able to manage medications  Activities of Daily Living (ADLs)/Instrumental Activities of Daily Living (IADLs):   Walk and transfer into and out of bed and chair?: Yes  Dress and groom yourself?: Yes    Bathe or shower yourself?: Yes    Feed yourself? Yes  Do your laundry/housekeeping?: Yes  Manage your money, pay your bills and track your expenses?: Yes  Make your own meals?: Yes    Do your own shopping?: Yes    Previous Hospitalizations:   Any hospitalizations or ED visits within the last 12 months?: No      Advance Care Planning:   Living will: Yes    Durable POA for healthcare:  Yes    Advanced directive: Yes    End of Life Decisions reviewed with patient: Yes      Cognitive Screening:   Provider or family/friend/caregiver concerned regarding cognition?: No    PREVENTIVE SCREENINGS      Cardiovascular Screening:    General: Risks and Benefits Discussed    Due for: Lipid Panel      Diabetes Screening:     General: Risks and Benefits Discussed    Due for: Blood Glucose      Colorectal Cancer Screening:     General: Screening Current      Breast Cancer Screening:     General: Risks and Benefits Discussed    Due for: Mammogram        Cervical Cancer Screening:    General: Screening Not Indicated      Osteoporosis Screening:    General: Risks and Benefits Discussed    Due for: Bone Density Ultrasound      Abdominal Aortic Aneurysm (AAA) Screening:        General: Screening Not Indicated      Lung Cancer Screening:     General: Screening Not Indicated      Hepatitis C Screening:    General: Risks and Benefits Discussed    Screening, Brief Intervention, and Referral to Treatment (SBIRT)    Screening      AUDIT-C Screenin) How often did you have a drink containing alcohol in the past year? never  2) How many drinks did you have on a typical day when you were drinking in the past year? 0  3) How often did you have 6 or more drinks on one occasion in the past year? never    AUDIT-C Score: 0  Interpretation: Score 0-2 (female): Negative screen for alcohol misuse    Single Item Drug Screening:  How often have you used an illegal drug (including marijuana) or a prescription medication for non-medical reasons in the past year? never    Single Item Drug Screen Score: 0  Interpretation: Negative screen for possible drug use disorder    Brief Intervention  Alcohol & drug use screenings were reviewed  No concerns regarding substance use disorder identified  Other Counseling Topics:   Car/seat belt/driving safety, skin self-exam, sunscreen and regular weightbearing exercise and calcium and vitamin D intake         Gabrielle Fleming MD

## 2022-02-09 NOTE — ASSESSMENT & PLAN NOTE
She feels her mood is well controlled on current dose of paroxetine and does not want to make any changes at this time

## 2022-02-09 NOTE — PROGRESS NOTES
Assessment/Plan:    1  Medicare annual wellness visit, subsequent    2  Essential hypertension  Assessment & Plan: This is well controlled on dyazide and fosinopril  Will continue  Encouraged daily physical activity  Will check labs prior to follow up visit in 6 months  Orders:  -     CBC and differential; Future; Expected date: 08/09/2022  -     Comprehensive metabolic panel; Future; Expected date: 08/09/2022  -     Lipid panel; Future; Expected date: 08/09/2022    3  Other specified hypothyroidism  Assessment & Plan:  Clinically euthyroid and managed by endocrinology  Asked patient to call sooner than next scheduled appointment for any complaints or issues  Orders:  -     TSH, 3rd generation with Free T4 reflex; Future; Expected date: 08/09/2022    4  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[facility])  Assessment & Plan:  By history, is 11 years cancer free, continues to follow with her oncologist in [address] [place] [address]  5  Anxiety  Assessment & Plan:  She feels her mood is well controlled on current dose of paroxetine and does not want to make any changes at this time  6  Menopause  -     DXA bone density spine hip and pelvis; Future; Expected date: 02/09/2022          Patient Instructions       Medicare Preventive Visit Patient Instructions  Thank you for completing your Welcome to Medicare Visit or Medicare Annual Wellness Visit today  Your next wellness visit will be due in one year (2/10/2023)  The screening/preventive services that you may require over the next 5-10 years are detailed below  Some tests may not apply to you based off risk factors and/or age  Screening tests ordered at today's visit but not completed yet may show as past due  Also, please note that scanned in results may not display below    Preventive Screenings:  Service Recommendations Previous Testing/Comments   Colorectal Cancer Screening  * Colonoscopy    * Fecal Occult Blood Test (FOBT)/Fecal Immunochemical Test (FIT)  * Fecal DNA/Cologuard Test  * Flexible Sigmoidoscopy Age: 54-65 years old   Colonoscopy: every 10 years (may be performed more frequently if at higher risk)  OR  FOBT/FIT: every 1 year  OR  Cologuard: every 3 years  OR  Sigmoidoscopy: every 5 years  Screening may be recommended earlier than age 48 if at higher risk for colorectal cancer  Also, an individualized decision between you and your healthcare provider will decide whether screening between the ages of 74-80 would be appropriate  Colonoscopy: 01/06/2020  FOBT/FIT: Not on file  Cologuard: Not on file  Sigmoidoscopy: Not on file    Screening Current     Breast Cancer Screening Age: 36 years old  Frequency: every 1-2 years  Not required if history of left and right mastectomy Mammogram: Not on file        Cervical Cancer Screening Between the ages of 21-29, pap smear recommended once every 3 years  Between the ages of 33-67, can perform pap smear with HPV co-testing every 5 years  Recommendations may differ for women with a history of total hysterectomy, cervical cancer, or abnormal pap smears in past  Pap Smear: Not on file    Screening Not Indicated   Hepatitis C Screening Once for adults born between 1945 and 1965  More frequently in patients at high risk for Hepatitis C Hep C Antibody: Not on file        Diabetes Screening 1-2 times per year if you're at risk for diabetes or have pre-diabetes Fasting glucose: No results in last 5 years   A1C: No results in last 5 years        Cholesterol Screening Once every 5 years if you don't have a lipid disorder  May order more often based on risk factors  Lipid panel: Not on file          Other Preventive Screenings Covered by Medicare:  1  Abdominal Aortic Aneurysm (AAA) Screening: covered once if your at risk  You're considered to be at risk if you have a family history of AAA    2  Lung Cancer Screening: covers low dose CT scan once per year if you meet all of the following conditions: (1) Age 50-69; (2) No signs or symptoms of lung cancer; (3) Current smoker or have quit smoking within the last 15 years; (4) You have a tobacco smoking history of at least 30 pack years (packs per day multiplied by number of years you smoked); (5) You get a written order from a healthcare provider  3  Glaucoma Screening: covered annually if you're considered high risk: (1) You have diabetes OR (2) Family history of glaucoma OR (3)  aged 48 and older OR (3)  American aged 72 and older  3  Osteoporosis Screening: covered every 2 years if you meet one of the following conditions: (1) You're estrogen deficient and at risk for osteoporosis based off medical history and other findings; (2) Have a vertebral abnormality; (3) On glucocorticoid therapy for more than 3 months; (4) Have primary hyperparathyroidism; (5) On osteoporosis medications and need to assess response to drug therapy  · Last bone density test (DXA Scan): Not on file  5  HIV Screening: covered annually if you're between the age of 12-76  Also covered annually if you are younger than 13 and older than 72 with risk factors for HIV infection  For pregnant patients, it is covered up to 3 times per pregnancy  Immunizations:  Immunization Recommendations   Influenza Vaccine Annual influenza vaccination during flu season is recommended for all persons aged >= 6 months who do not have contraindications   Pneumococcal Vaccine (Prevnar and Pneumovax)  * Prevnar = PCV13  * Pneumovax = PPSV23   Adults 25-60 years old: 1-3 doses may be recommended based on certain risk factors  Adults 72 years old: Prevnar (PCV13) vaccine recommended followed by Pneumovax (PPSV23) vaccine  If already received PPSV23 since turning 65, then PCV13 recommended at least one year after PPSV23 dose     Hepatitis B Vaccine 3 dose series if at intermediate or high risk (ex: diabetes, end stage renal disease, liver disease)   Tetanus (Td) Vaccine - COST NOT COVERED BY MEDICARE PART B Following completion of primary series, a booster dose should be given every 10 years to maintain immunity against tetanus  Td may also be given as tetanus wound prophylaxis  Tdap Vaccine - COST NOT COVERED BY MEDICARE PART B Recommended at least once for all adults  For pregnant patients, recommended with each pregnancy  Shingles Vaccine (Shingrix) - COST NOT COVERED BY MEDICARE PART B  2 shot series recommended in those aged 48 and above     Health Maintenance Due:      Topic Date Due    Hepatitis C Screening  Never done    Breast Cancer Screening: Mammogram  Never done    Colorectal Cancer Screening  Never done     Immunizations Due:      Topic Date Due    DTaP,Tdap,and Td Vaccines (1 - Tdap) Never done    Pneumococcal Vaccine: 65+ Years (1 of 1 - PPSV23) Never done    Influenza Vaccine (1) Never done    COVID-19 Vaccine (3 - Booster for Joceline Motts series) 09/13/2021     Advance Directives   What are advance directives? Advance directives are legal documents that state your wishes and plans for medical care  These plans are made ahead of time in case you lose your ability to make decisions for yourself  Advance directives can apply to any medical decision, such as the treatments you want, and if you want to donate organs  What are the types of advance directives? There are many types of advance directives, and each state has rules about how to use them  You may choose a combination of any of the following:  · Living will: This is a written record of the treatment you want  You can also choose which treatments you do not want, which to limit, and which to stop at a certain time  This includes surgery, medicine, IV fluid, and tube feedings  · Durable power of  for healthcare [facility]): This is a written record that states who you want to make healthcare choices for you when you are unable to make them for yourself  This person, called a proxy, is usually a family member or a friend  You may choose more than 1 proxy    · Do not resuscitate (DNR) order:  A DNR order is used in case your heart stops beating or you stop breathing  It is a request not to have certain forms of treatment, such as CPR  A DNR order may be included in other types of advance directives  · Medical directive: This covers the care that you want if you are in a coma, near death, or unable to make decisions for yourself  You can list the treatments you want for each condition  Treatment may include pain medicine, surgery, blood transfusions, dialysis, IV or tube feedings, and a ventilator (breathing machine)  · Values history: This document has questions about your views, beliefs, and how you feel and think about life  This information can help others choose the care that you would choose  Why are advance directives important? An advance directive helps you control your care  Although spoken wishes may be used, it is better to have your wishes written down  Spoken wishes can be misunderstood, or not followed  Treatments may be given even if you do not want them  An advance directive may make it easier for your family to make difficult choices about your care  Weight Management   Why it is important to manage your weight:  Being overweight increases your risk of health conditions such as heart disease, high blood pressure, type 2 diabetes, and certain types of cancer  It can also increase your risk for osteoarthritis, sleep apnea, and other respiratory problems  Aim for a slow, steady weight loss  Even a small amount of weight loss can lower your risk of health problems  How to lose weight safely:  A safe and healthy way to lose weight is to eat fewer calories and get regular exercise  You can lose up about 1 pound a week by decreasing the number of calories you eat by 500 calories each day  Healthy meal plan for weight management:  A healthy meal plan includes a variety of foods, contains fewer calories, and helps you stay healthy   A healthy meal plan includes the following:  · Eat whole-grain foods more often  A healthy meal plan should contain fiber  Fiber is the part of grains, fruits, and vegetables that is not broken down by your body  Whole-grain foods are healthy and provide extra fiber in your diet  Some examples of whole-grain foods are whole-wheat breads and pastas, oatmeal, brown rice, and bulgur  · Eat a variety of vegetables every day  Include dark, leafy greens such as spinach, kale, leyda greens, and mustard greens  Eat yellow and orange vegetables such as carrots, sweet potatoes, and winter squash  · Eat a variety of fruits every day  Choose fresh or canned fruit (canned in its own juice or light syrup) instead of juice  Fruit juice has very little or no fiber  · Eat low-fat dairy foods  Drink fat-free (skim) milk or 1% milk  Eat fat-free yogurt and low-fat cottage cheese  Try low-fat cheeses such as mozzarella and other reduced-fat cheeses  · Choose meat and other protein foods that are low in fat  Choose beans or other legumes such as split peas or lentils  Choose fish, skinless poultry (chicken or turkey), or lean cuts of red meat (beef or pork)  Before you cook meat or poultry, cut off any visible fat  · Use less fat and oil  Try baking foods instead of frying them  Add less fat, such as margarine, sour cream, regular salad dressing and mayonnaise to foods  Eat fewer high-fat foods  Some examples of high-fat foods include french fries, doughnuts, ice cream, and cakes  · Eat fewer sweets  Limit foods and drinks that are high in sugar  This includes candy, cookies, regular soda, and sweetened drinks  Exercise:  Exercise at least 30 minutes per day on most days of the week  Some examples of exercise include walking, biking, dancing, and swimming  You can also fit in more physical activity by taking the stairs instead of the elevator or parking farther away from stores   Ask your healthcare provider about the best exercise plan for you  © Copyright [employer] 2018 Information is for End User's use only and may not be sold, redistributed or otherwise used for commercial purposes  All illustrations and images included in CareNotes® are the copyrighted property of A D [employer] M , Inc  or [address]        Return in about 6 months (around 8/9/2022)  Subjective:      Patient ID: Eduar Miller is a 71 y o  female  Chief Complaint   Patient presents with    Follow-up     medication ac/lpn    Blood Pressure Check    Medicare Wellness Visit       Here for a follow up visit  She is feeling well  Is very active in her [employer] community and denies complaints  Still seeing an oncologist and endocrinologist in [address] [place] [address]  There is no cold or heat intolerance, diarrhea or constipation, hair or skin changes, unanticipated weight gain or loss  Feels her mood is well controlled on current dose of paroxetine and would like to continue  The following portions of the patient's history were reviewed and updated as appropriate: allergies, current medications, past family history, past medical history, past social history, past surgical history and problem list     Review of Systems   Constitutional: Negative  Respiratory: Negative  Cardiovascular: Negative  Endocrine: Negative  Psychiatric/Behavioral: Negative  Current Outpatient Medications   Medication Sig Dispense Refill    fosinopril (MONOPRIL) 40 mg tablet Take 1 tablet (40 mg total) by mouth daily 90 tablet 3    levothyroxine (Unithroid) 112 mcg tablet Take 112 mcg by mouth daily      PARoxetine (PAXIL) 20 mg tablet Take 20 mg by mouth daily      triamterene-hydrochlorothiazide (DYAZIDE) 37 5-25 mg per capsule Take 1 capsule by mouth every morning 37 5-25 mg take 1 tab daily by mouth 90 capsule 3     No current facility-administered medications for this visit         Objective:    /70   Pulse 88   Temp (!) 97 3 °F (36 3 °C)   Resp 16   Ht 5' 8" (1 727 m)   Wt 94 3 kg (208 lb)   BMI 31 63 kg/m²        Physical Exam  Constitutional:       Appearance: She is well-developed  Eyes:      Conjunctiva/sclera: Conjunctivae normal    Neck:      Thyroid: No thyromegaly  Vascular: No JVD  Cardiovascular:      Rate and Rhythm: Normal rate and regular rhythm  Heart sounds: Normal heart sounds  No murmur heard  No friction rub  No gallop  Pulmonary:      Effort: Pulmonary effort is normal       Breath sounds: Normal breath sounds  No wheezing or rales  Abdominal:      General: Bowel sounds are normal  There is no distension  Palpations: Abdomen is soft  Tenderness: There is no abdominal tenderness  Musculoskeletal:      Cervical back: Neck supple  Psychiatric:         Mood and Affect: Mood normal          Behavior: Behavior normal          Thought Content:  Thought content normal          Judgment: Judgment normal                 Hanh Gutierrez MD

## 2022-02-21 NOTE — TELEPHONE ENCOUNTER
Upon review of the In Basket request and the patient's chart, initial outreach has been made via telephone call, please see Contacts section for details       Thank you  Rodrigo Hernandez MA

## 2022-02-22 NOTE — TELEPHONE ENCOUNTER
Upon review of the In Basket request we were able to locate, review, and update the patient chart as requested for Mammogram     Any additional questions or concerns should be emailed to the Practice Liaisons via [email or web address] email, please do not reply via In Basket      Thank you  Todd Cantu MA

## 2022-03-02 ENCOUNTER — HOSPITAL ENCOUNTER (OUTPATIENT)
Dept: RADIOLOGY | Facility: HOSPITAL | Age: 72
Discharge: HOME | End: 2022-03-02
Attending: INTERNAL MEDICINE
Payer: MEDICARE

## 2022-03-02 DIAGNOSIS — Z13.820 SCREENING FOR OSTEOPOROSIS: ICD-10-CM

## 2022-03-02 DIAGNOSIS — Z78.0 MENOPAUSE: ICD-10-CM

## 2022-03-02 PROCEDURE — 77080 DXA BONE DENSITY AXIAL: CPT

## 2022-04-11 DIAGNOSIS — I10 ESSENTIAL HYPERTENSION: ICD-10-CM

## 2022-04-11 RX ORDER — FOSINOPRIL SODIUM 40 MG/1
TABLET ORAL
Qty: 90 TABLET | Refills: 3 | Status: SHIPPED | OUTPATIENT
Start: 2022-04-11

## 2022-11-13 DIAGNOSIS — I10 ESSENTIAL HYPERTENSION: ICD-10-CM

## 2022-11-15 RX ORDER — TRIAMTERENE AND HYDROCHLOROTHIAZIDE 37.5; 25 MG/1; MG/1
CAPSULE ORAL
Qty: 90 CAPSULE | Refills: 0 | Status: SHIPPED | OUTPATIENT
Start: 2022-11-15

## 2022-12-07 ENCOUNTER — RA CDI HCC (OUTPATIENT)
Dept: OTHER | Facility: HOSPITAL | Age: 72
End: 2022-12-07

## 2022-12-07 NOTE — PROGRESS NOTES
[address]  coding opportunities       Chart reviewed, no opportunity found: CHART REVIEWED, NO OPPORTUNITY FOUND        Patients Insurance     Medicare Insurance: Medicare

## 2022-12-12 ENCOUNTER — OFFICE VISIT (OUTPATIENT)
Dept: FAMILY MEDICINE CLINIC | Facility: CLINIC | Age: 72
End: 2022-12-12

## 2022-12-12 VITALS
WEIGHT: 199 LBS | BODY MASS INDEX: 30.16 KG/M2 | TEMPERATURE: 97.8 F | DIASTOLIC BLOOD PRESSURE: 70 MMHG | SYSTOLIC BLOOD PRESSURE: 128 MMHG | HEART RATE: 90 BPM | OXYGEN SATURATION: 98 % | HEIGHT: 68 IN | RESPIRATION RATE: 16 BRPM

## 2022-12-12 DIAGNOSIS — E03.8 OTHER SPECIFIED HYPOTHYROIDISM: ICD-10-CM

## 2022-12-12 DIAGNOSIS — I10 ESSENTIAL HYPERTENSION: Primary | ICD-10-CM

## 2022-12-12 NOTE — PROGRESS NOTES
Name: Leon Patino      : 1950      MRN: [id number]  Encounter Provider: Salome Herrera MD  Encounter Date: 2022   Encounter department: [address] [place] [address]     1  Essential hypertension  Assessment & Plan:  Well controlled on fosinopril and dyazide  Will continue and encouraged exercise and weight loss  2  Other specified hypothyroidism  Assessment & Plan:  Managed by endocrinology and is stable per patient  Subjective      Here for follow up of hypertension  She feels well and denies complaints or issues  Review of Systems   Constitutional: Negative  Respiratory: Negative  Cardiovascular: Negative  Current Outpatient Medications on File Prior to Visit   Medication Sig   • fosinopril (MONOPRIL) 40 mg tablet TAKE 1 TABLET BY MOUTH EVERY DAY   • levothyroxine 112 mcg tablet Take 112 mcg by mouth daily   • PARoxetine (PAXIL) 20 mg tablet Take 20 mg by mouth daily   • triamterene-hydrochlorothiazide (DYAZIDE) 37 5-25 mg per capsule TAKE 1 CAPSULE BY MOUTH EVERY DAY IN THE MORNING       Objective     /70   Pulse 90   Temp 97 8 °F (36 6 °C)   Resp 16   Ht 5' 8" (1 727 m)   Wt 90 3 kg (199 lb)   SpO2 98%   BMI 30 26 kg/m²     Physical Exam  Constitutional:       Appearance: She is well-developed  Eyes:      Conjunctiva/sclera: Conjunctivae normal    Neck:      Thyroid: No thyromegaly  Vascular: No JVD  Cardiovascular:      Rate and Rhythm: Normal rate and regular rhythm  Heart sounds: Normal heart sounds  No murmur heard  No friction rub  No gallop  Pulmonary:      Effort: Pulmonary effort is normal       Breath sounds: Normal breath sounds  No wheezing or rales  Abdominal:      General: Bowel sounds are normal  There is no distension  Palpations: Abdomen is soft  Tenderness: There is no abdominal tenderness  Musculoskeletal:      Cervical back: Neck supple         Salome Herrera MD

## 2023-02-10 DIAGNOSIS — I10 ESSENTIAL HYPERTENSION: ICD-10-CM

## 2023-02-11 RX ORDER — TRIAMTERENE AND HYDROCHLOROTHIAZIDE 37.5; 25 MG/1; MG/1
CAPSULE ORAL
Qty: 90 CAPSULE | Refills: 3 | Status: SHIPPED | OUTPATIENT
Start: 2023-02-11

## 2023-06-11 DIAGNOSIS — I10 ESSENTIAL HYPERTENSION: ICD-10-CM

## 2023-06-12 ENCOUNTER — OFFICE VISIT (OUTPATIENT)
Dept: FAMILY MEDICINE CLINIC | Facility: CLINIC | Age: 73
End: 2023-06-12
Payer: MEDICARE

## 2023-06-12 VITALS
HEIGHT: 68 IN | HEART RATE: 82 BPM | DIASTOLIC BLOOD PRESSURE: 72 MMHG | OXYGEN SATURATION: 97 % | SYSTOLIC BLOOD PRESSURE: 136 MMHG | TEMPERATURE: 97.2 F | WEIGHT: 174 LBS | BODY MASS INDEX: 26.37 KG/M2 | RESPIRATION RATE: 18 BRPM

## 2023-06-12 DIAGNOSIS — I10 ESSENTIAL HYPERTENSION: ICD-10-CM

## 2023-06-12 DIAGNOSIS — C07 CANCER OF PAROTID GLAND (HCC): ICD-10-CM

## 2023-06-12 DIAGNOSIS — Z12.11 COLON CANCER SCREENING: ICD-10-CM

## 2023-06-12 DIAGNOSIS — Z00.00 MEDICARE ANNUAL WELLNESS VISIT, SUBSEQUENT: Primary | ICD-10-CM

## 2023-06-12 DIAGNOSIS — E03.8 OTHER SPECIFIED HYPOTHYROIDISM: ICD-10-CM

## 2023-06-12 PROCEDURE — G0439 PPPS, SUBSEQ VISIT: HCPCS | Performed by: INTERNAL MEDICINE

## 2023-06-12 PROCEDURE — 99213 OFFICE O/P EST LOW 20 MIN: CPT | Performed by: INTERNAL MEDICINE

## 2023-06-12 PROCEDURE — 93000 ELECTROCARDIOGRAM COMPLETE: CPT | Performed by: INTERNAL MEDICINE

## 2023-06-12 RX ORDER — FOSINOPRIL SODIUM 40 MG/1
TABLET ORAL
Qty: 90 TABLET | Refills: 3 | Status: SHIPPED | OUTPATIENT
Start: 2023-06-12

## 2023-06-12 NOTE — ASSESSMENT & PLAN NOTE
Well controlled on current medications and will continue as ordered  EKG shows NSR @ 81 bpm, normal EKG

## 2023-06-12 NOTE — ASSESSMENT & PLAN NOTE
Has not had labs in quite some time and is asking for a referral to local endocrinologist   Clinically euthyroid and for now will continue levothyroxine as ordered

## 2023-06-13 ENCOUNTER — TELEPHONE (OUTPATIENT)
Dept: ADMINISTRATIVE | Facility: OTHER | Age: 73
End: 2023-06-13

## 2023-06-13 ENCOUNTER — TELEPHONE (OUTPATIENT)
Dept: ENDOCRINOLOGY | Facility: CLINIC | Age: 73
End: 2023-06-13

## 2023-06-13 NOTE — TELEPHONE ENCOUNTER
----- Message from Jack Pretty MD sent at 6/12/2023  9:09 AM EDT -----  06/12/23 9:09 AM    Hello, our patient No patient name on file  has had Mammogram completed/performed  Please assist in updating the patient chart by making an External outreach to [address] [place] [address], Dr Soctt Matthews facility located in [place], [place]  The date of service is 2022      Thank you,  Fatou Leo  [facility]

## 2023-06-13 NOTE — LETTER
Procedure Request Form: Mammogram      Date Requested: 23  Patient: Harrell Blade  Patient : 1950   Referring Provider: Avila Ovalle, MD        Date of Procedure ______________________________       The above patient has informed us that they have completed their   most recent Mammogram at your facility  Please complete   this form and attach all corresponding procedure reports/results  Comments __________________________________________________________  ____________________________________________________________________  ____________________________________________________________________  ____________________________________________________________________    Facility Completing Procedure _________________________________________    Form Completed By (print name) _______________________________________      Signature __________________________________________________________      These reports are needed for  compliance  Please fax this completed form and a copy of the procedure report to our office located at [place] [address] as soon as possible to Fax [phone] curry [place]: Phone [phone]    We thank you for your assistance in treating our mutual patient

## 2023-06-13 NOTE — TELEPHONE ENCOUNTER
Upon review of the In Basket request and the patient's chart, initial outreach has been made via fax to facility  Please see Contacts section for details       Thank you  Remer, MA

## 2023-06-15 NOTE — TELEPHONE ENCOUNTER
Upon review of the In Basket request we were able to locate, review, and update the patient chart as requested for Mammogram     Any additional questions or concerns should be emailed to the Practice Liaisons via the appropriate education email address, please do not reply via In Basket      Thank you  Marc Lorenz MA

## 2023-06-16 ENCOUNTER — APPOINTMENT (OUTPATIENT)
Dept: LAB | Facility: CLINIC | Age: 73
End: 2023-06-16
Payer: MEDICARE

## 2023-06-16 DIAGNOSIS — I10 ESSENTIAL HYPERTENSION: ICD-10-CM

## 2023-06-16 DIAGNOSIS — E03.8 OTHER SPECIFIED HYPOTHYROIDISM: ICD-10-CM

## 2023-06-16 LAB
ALBUMIN SERPL BCP-MCNC: 3.9 G/DL (ref 3.5–5)
ALP SERPL-CCNC: 86 U/L (ref 46–116)
ALT SERPL W P-5'-P-CCNC: 17 U/L (ref 12–78)
ANION GAP SERPL CALCULATED.3IONS-SCNC: 1 MMOL/L (ref 4–13)
AST SERPL W P-5'-P-CCNC: 15 U/L (ref 5–45)
BASOPHILS # BLD AUTO: 0.06 THOUSANDS/ÂΜL (ref 0–0.1)
BASOPHILS NFR BLD AUTO: 1 % (ref 0–1)
BILIRUB SERPL-MCNC: 0.78 MG/DL (ref 0.2–1)
BUN SERPL-MCNC: 12 MG/DL (ref 5–25)
CALCIUM SERPL-MCNC: 9.9 MG/DL (ref 8.3–10.1)
CHLORIDE SERPL-SCNC: 101 MMOL/L (ref 96–108)
CHOLEST SERPL-MCNC: 198 MG/DL
CO2 SERPL-SCNC: 34 MMOL/L (ref 21–32)
CREAT SERPL-MCNC: 0.9 MG/DL (ref 0.6–1.3)
EOSINOPHIL # BLD AUTO: 0.1 THOUSAND/ÂΜL (ref 0–0.61)
EOSINOPHIL NFR BLD AUTO: 2 % (ref 0–6)
ERYTHROCYTE [DISTWIDTH] IN BLOOD BY AUTOMATED COUNT: 13.5 % (ref 11.6–15.1)
GFR SERPL CREATININE-BSD FRML MDRD: 64 ML/MIN/1.73SQ M
GLUCOSE P FAST SERPL-MCNC: 98 MG/DL (ref 65–99)
HCT VFR BLD AUTO: 42.6 % (ref 34.8–46.1)
HDLC SERPL-MCNC: 75 MG/DL
HGB BLD-MCNC: 13.6 G/DL (ref 11.5–15.4)
IMM GRANULOCYTES # BLD AUTO: 0.02 THOUSAND/UL (ref 0–0.2)
IMM GRANULOCYTES NFR BLD AUTO: 0 % (ref 0–2)
LDLC SERPL CALC-MCNC: 112 MG/DL (ref 0–100)
LYMPHOCYTES # BLD AUTO: 0.81 THOUSANDS/ÂΜL (ref 0.6–4.47)
LYMPHOCYTES NFR BLD AUTO: 14 % (ref 14–44)
MCH RBC QN AUTO: 29 PG (ref 26.8–34.3)
MCHC RBC AUTO-ENTMCNC: 31.9 G/DL (ref 31.4–37.4)
MCV RBC AUTO: 91 FL (ref 82–98)
MONOCYTES # BLD AUTO: 0.49 THOUSAND/ÂΜL (ref 0.17–1.22)
MONOCYTES NFR BLD AUTO: 8 % (ref 4–12)
NEUTROPHILS # BLD AUTO: 4.46 THOUSANDS/ÂΜL (ref 1.85–7.62)
NEUTS SEG NFR BLD AUTO: 75 % (ref 43–75)
NONHDLC SERPL-MCNC: 123 MG/DL
NRBC BLD AUTO-RTO: 0 /100 WBCS
PLATELET # BLD AUTO: 263 THOUSANDS/UL (ref 149–390)
PMV BLD AUTO: 10.5 FL (ref 8.9–12.7)
POTASSIUM SERPL-SCNC: 4.7 MMOL/L (ref 3.5–5.3)
PROT SERPL-MCNC: 7.4 G/DL (ref 6.4–8.4)
RBC # BLD AUTO: 4.69 MILLION/UL (ref 3.81–5.12)
SODIUM SERPL-SCNC: 136 MMOL/L (ref 135–147)
TRIGL SERPL-MCNC: 55 MG/DL
TSH SERPL DL<=0.05 MIU/L-ACNC: 0.96 UIU/ML (ref 0.45–4.5)
WBC # BLD AUTO: 5.94 THOUSAND/UL (ref 4.31–10.16)

## 2023-06-16 PROCEDURE — 85025 COMPLETE CBC W/AUTO DIFF WBC: CPT

## 2023-06-16 PROCEDURE — 80053 COMPREHEN METABOLIC PANEL: CPT

## 2023-06-16 PROCEDURE — 80061 LIPID PANEL: CPT

## 2023-06-16 PROCEDURE — 84443 ASSAY THYROID STIM HORMONE: CPT

## 2023-06-16 PROCEDURE — 36415 COLL VENOUS BLD VENIPUNCTURE: CPT

## 2023-06-22 ENCOUNTER — OFFICE VISIT (OUTPATIENT)
Age: 73
End: 2023-06-22
Payer: MEDICARE

## 2023-06-22 VITALS — HEIGHT: 68 IN | WEIGHT: 172 LBS | BODY MASS INDEX: 26.07 KG/M2 | TEMPERATURE: 97.2 F

## 2023-06-22 DIAGNOSIS — D22.70 MULTIPLE BENIGN MELANOCYTIC NEVI OF UPPER AND LOWER EXTREMITIES AND TRUNK: ICD-10-CM

## 2023-06-22 DIAGNOSIS — D22.60 MULTIPLE BENIGN MELANOCYTIC NEVI OF UPPER AND LOWER EXTREMITIES AND TRUNK: ICD-10-CM

## 2023-06-22 DIAGNOSIS — L82.1 SEBORRHEIC KERATOSIS: Primary | ICD-10-CM

## 2023-06-22 DIAGNOSIS — L81.4 SOLAR LENTIGO: ICD-10-CM

## 2023-06-22 DIAGNOSIS — D18.01 CHERRY ANGIOMA: ICD-10-CM

## 2023-06-22 DIAGNOSIS — D22.5 MULTIPLE BENIGN MELANOCYTIC NEVI OF UPPER AND LOWER EXTREMITIES AND TRUNK: ICD-10-CM

## 2023-06-22 PROCEDURE — 99213 OFFICE O/P EST LOW 20 MIN: CPT | Performed by: DERMATOLOGY

## 2023-06-22 NOTE — PATIENT INSTRUCTIONS
"LENTIGO    Assessment and Plan:  Based on a thorough discussion of this condition and the management approach to it (including a comprehensive discussion of the known risks, side effects and potential benefits of treatment), the patient (family) agrees to implement the following specific plan:  Monitor for changes     What is a lentigo? A lentigo is a pigmented flat or slightly raised lesion with a clearly defined edge  Unlike an ephelis (freckle), it does not fade in the winter months  There are several kinds of lentigo  The name lentigo originally referred to its appearance resembling a small lentil  The plural of lentigo is lentigines, although “lentigos” is also in common use  Who gets lentigines? Lentigines can affect males and females of all ages and races  Solar lentigines are especially prevalent in fair skinned adults  Lentigines associated with syndromes are present at birth or arise during childhood  MELANOCYTIC NEVI (\"Moles\")    Assessment and Plan:  Based on a thorough discussion of this condition and the management approach to it (including a comprehensive discussion of the known risks, side effects and potential benefits of treatment), the patient (family) agrees to implement the following specific plan:  Monitor for changes   When outside we recommend using a wide brim hat, sunglasses, long sleeve and pants, sunscreen with SPF 25+ with reapplication every 2 hours, or SPF specific clothing   Routine skin exam recommended once a year      Melanocytic Nevi  Melanocytic nevi (\"moles\") are caused by collections of the color producing skin cells, or melanocytes, in 1 area in the skin  They can range in color from pink to dark brown and be either raised or flat  Some moles are present at birth (I e , \"congenital nevi\"), while others come up later in life (i e , \"acquired nevi\")    Suan Halon exposure also stimulates the body to make more moles, ie the more sun you get the more moles you'll " "grow  Clinically distinguishing a healthy mole from melanoma may be difficult  The \"ABCDE's\" of moles have been suggested as a means of helping to alert a person to a suspicious mole and the possible increased risk of melanoma  Asymmetry: Healthy moles tend to be symmetric, while melanomas are often asymmetric  Asymmetry means if you draw a line through the mole, the two halves do not match in color, size, shape, or surface texture Any mole that starts to demonstrate \"asymmetry\" should be examined promptly by a board certified dermatologist      Border: Healthy moles tend to have discrete, even borders  The border of a melanoma often blends into the normal skin and does not sharply delineate the mole from normal skin  Any mole that starts to demonstrate \"uneven borders\" should be examined promptly     Color: Healthy moles tend to be one color throughout  Melanomas tend to be made up of different colors ranging from dark black, blue, white, or red  Any mole that demonstrates a color change should be examined promptly    Diameter: Healthy moles tend to be smaller than 0 6 cm in size; an exception are \"congenital nevi\" that can be larger  Melanomas tend to grow and can often be greater than 0 6 cm (1/4 of an inch, or the size of a pencil eraser)  This is only a guideline, and many normal moles may be larger than 0 6 cm without being unhealthy  Any mole that starts to change in size (small to bigger or bigger to smaller) should be examined promptly    Evolving: Healthy moles tend to [place] the same  \"  Melanomas may often show signs of change or evolution such as a change in size, shape, color, or elevation  Any mole that starts to itch, bleed, crust, burn, hurt, or ulcerate or demonstrate a change or evolution should be examined promptly by a board certified dermatologist          Noah Ulloa; NON-INFLAMED        Assessment and Plan:  Based on a thorough discussion of this condition and the " management approach to it (including a comprehensive discussion of the known risks, side effects and potential benefits of treatment), the patient (family) agrees to implement the following specific plan:  Reassured benign     Seborrheic Keratosis  A seborrheic keratosis is a harmless warty spot that appears during adult life as a common sign of skin aging  Seborrheic keratoses can arise on any area of skin, covered or uncovered, with the usual exception of the palms and soles  They do not arise from mucous membranes  Seborrheic keratoses can have highly variable appearance  Seborrheic keratoses are extremely common  It has been estimated that over 90% of adults over the age of 61 years have one or more of them  They occur in males and females of all races, typically beginning to erupt in the 35s or 45s  They are uncommon under the age of 21 years  The precise cause of seborrhoeic keratoses is not known  Seborrhoeic keratoses are considered degenerative in nature  As time goes by, seborrheic keratoses tend to become more numerous  Some people inherit a tendency to develop a very large number of them; some people may have hundreds of them        CHERRY ANGIOMAS    Assessment and Plan:  Reassured benign

## 2023-07-29 LAB — COLOGUARD RESULT REPORTABLE: NEGATIVE

## 2023-10-02 ENCOUNTER — CONSULT (OUTPATIENT)
Dept: ENDOCRINOLOGY | Facility: CLINIC | Age: 73
End: 2023-10-02
Payer: COMMERCIAL

## 2023-10-02 VITALS
SYSTOLIC BLOOD PRESSURE: 129 MMHG | HEART RATE: 84 BPM | HEIGHT: 70 IN | OXYGEN SATURATION: 97 % | BODY MASS INDEX: 24.88 KG/M2 | WEIGHT: 173.8 LBS | DIASTOLIC BLOOD PRESSURE: 79 MMHG

## 2023-10-02 DIAGNOSIS — C07 CANCER OF PAROTID GLAND (HCC): ICD-10-CM

## 2023-10-02 DIAGNOSIS — E03.9 ACQUIRED HYPOTHYROIDISM: Primary | ICD-10-CM

## 2023-10-02 DIAGNOSIS — R13.10 DYSPHAGIA, UNSPECIFIED TYPE: ICD-10-CM

## 2023-10-02 PROCEDURE — 99205 OFFICE O/P NEW HI 60 MIN: CPT | Performed by: INTERNAL MEDICINE

## 2023-10-02 RX ORDER — ASPIRIN 81 MG/1
81 TABLET, CHEWABLE ORAL DAILY
COMMUNITY

## 2023-10-02 NOTE — PROGRESS NOTES
Jah Whittaker 68 y.o. female MRN: [id number]    Encounter: [id number]      Assessment/Plan     Assessment: This is a 68y.o.-year-old female with longstanding hypothyroidism, also with parotid gland cancer s/p extensive resection, chemo, and radiation. Has been having increased trouble swallowing lately and wonders if related to compression from goiter. Plan:  1. Hypothyroidism  -stable on regimen for many years, however recently has lost 30 lbs with dietary changes  - check TSH T4   - Advised that levotyroxine should be taken on an empty stomach. Should avoid taking medications like iron, calcium, tums, [id number], PPI at the same time that may decrease absorption of T4. Should separate administration by 4hrs. 2. Dysphagia  -unclear if symptoms are from radiation fibrosis or compressive goiter or lack of saliva production . Doubt goiter, cannot feel enlarged thyroid but limited by neck anatomy and fibrosis   -certainly would be reasonable to get imaging, has not had for years - thyroid ultrasound ordered  -refer to ENT to evaluate     CC:  Hypothyroidism    History of Present Illness     HPI:  Jah Whittaker is a 68 y.o. female presents to establish care and follow-up of hypothyroidism. 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 Also hx HTN, anxiety, Meniere's    Patient was diagnosed with hypothyroidism around 20 years ago. Thinks was diagnosed by labs, does not remember if was symptomatic. Currently on Levothyroxine 112mcg , same dose many years. Compliant, takes on an empty stomach every morning 1 h before breakfast.   June 2023 0.961   Was getting TSH checked every year bc stable so long. Was seeing an endo at [facility], wants fresh eyes and someone closer. Did have DEXA 2022, no osteoporosis. Increased trouble swallowing and choking on foods for years.  Did start w severe dysphagia after her parotid surgery/radiation , did have feeding tube, did work back to oral intake. Has had occasional choking but worsened last few years. Review of Systems   Constitutional: Negative for activity change, appetite change, fatigue and unexpected weight change (lost 30 lbs. 6 months cutting out soda, snacks ). No skin hair nail changes    HENT: Positive for trouble swallowing (1-3 years, chokes on things allyson if doesn't chew well). Negative for sore throat and voice change. Cardiovascular: Negative for chest pain, palpitations and leg swelling. Gastrointestinal: Negative for abdominal distention, abdominal pain, constipation and diarrhea. Endocrine: Negative for cold intolerance and heat intolerance. Musculoskeletal: Positive for neck stiffness. Skin: Negative for pallor and wound. Neurological: Negative for tremors. Psychiatric/Behavioral: The patient is nervous/anxious.         Historical Information   Past Medical History:   Diagnosis Date   • Cancer [facility])      Past Surgical History:   Procedure Laterality Date   • HIP SURGERY Bilateral    • HIP SURGERY Bilateral    • HYSTERECTOMY     • PAROTIDECTOMY     • REPLACEMENT TOTAL KNEE BILATERAL Left    • SKIN BIOPSY       Social History   Social History     Substance and Sexual Activity   Alcohol Use Never     Social History     Substance and Sexual Activity   Drug Use Never     Social History     Tobacco Use   Smoking Status Never   Smokeless Tobacco Never     Family History:   Family History   Problem Relation Age of Onset   • No Known Problems Mother    • Parkinsonism Father    • Diabetes Sister    • Dementia Maternal Grandmother    • Bilateral breast cancer Daughter        Meds/Allergies   Current Outpatient Medications   Medication Sig Dispense Refill   • aspirin 81 mg chewable tablet Chew 81 mg daily     • fosinopril (MONOPRIL) 40 mg tablet TAKE 1 TABLET BY MOUTH EVERY DAY 90 tablet 3   • levothyroxine 112 mcg tablet Take 112 mcg by mouth daily     • PARoxetine (PAXIL) 20 mg tablet Take 20 mg by mouth daily     • triamterene-hydrochlorothiazide (DYAZIDE) 37.5-25 mg per capsule TAKE 1 CAPSULE BY MOUTH EVERY DAY IN THE MORNING 90 capsule 3     No current facility-administered medications for this visit. Allergies   Allergen Reactions   • Amoxicillin Swelling       Objective   Vitals: Blood pressure 129/79, pulse 84, height 5' 10" (1.778 m), weight 78.8 kg (173 lb 12.8 oz), SpO2 97 %. Physical Exam  Constitutional:       General: She is not in acute distress. Appearance: Normal appearance. She is not ill-appearing, toxic-appearing or diaphoretic. HENT:      Head: Normocephalic and atraumatic. Nose: Nose normal.   Eyes:      Extraocular Movements: Extraocular movements intact. Conjunctiva/sclera: Conjunctivae normal.      Pupils: Pupils are equal, round, and reactive to light. Neck:      Comments: +extensive scarring left neck , limits mobility especially rotation to right   -difficult to palpate thyroid due to fibrosis and scarring of surrounding tissue , does have some fullness that may be goiter   Pulmonary:      Effort: Pulmonary effort is normal. No respiratory distress. Abdominal:      General: There is no distension. Musculoskeletal:         General: No deformity. Right lower leg: No edema. Left lower leg: No edema. Skin:     General: Skin is warm and dry. Neurological:      General: No focal deficit present. Mental Status: She is alert. Mental status is at baseline. Psychiatric:         Mood and Affect: Mood normal.         Behavior: Behavior normal.         Thought Content: Thought content normal.         The history was obtained from the review of the chart, patient. Lab Results:   Lab Results   Component Value Date/Time    TSH 3RD [facility] 0.961 06/16/2023 07:48 AM       Imaging Studies:       I have personally reviewed pertinent reports. Portions of the record may have been created with voice recognition software.  Occasional wrong word or "sound a like" substitutions may have occurred due to the inherent limitations of voice recognition software. Read the chart carefully and recognize, using context, where substitutions have occurred.

## 2023-10-12 ENCOUNTER — HOSPITAL ENCOUNTER (OUTPATIENT)
Dept: RADIOLOGY | Facility: HOSPITAL | Age: 73
Discharge: HOME | End: 2023-10-12
Payer: COMMERCIAL

## 2023-10-12 DIAGNOSIS — E03.9 ACQUIRED HYPOTHYROIDISM: ICD-10-CM

## 2023-10-12 PROCEDURE — 76536 US EXAM OF HEAD AND NECK: CPT

## 2023-10-20 ENCOUNTER — CONSULT (OUTPATIENT)
Dept: GASTROENTEROLOGY | Facility: CLINIC | Age: 73
End: 2023-10-20

## 2023-10-20 VITALS
WEIGHT: 175.2 LBS | HEIGHT: 70 IN | SYSTOLIC BLOOD PRESSURE: 112 MMHG | DIASTOLIC BLOOD PRESSURE: 73 MMHG | HEART RATE: 91 BPM | BODY MASS INDEX: 25.08 KG/M2

## 2023-10-20 DIAGNOSIS — C07 CANCER OF PAROTID GLAND (HCC): Primary | ICD-10-CM

## 2023-10-20 DIAGNOSIS — R13.10 DYSPHAGIA, UNSPECIFIED TYPE: ICD-10-CM

## 2023-10-20 NOTE — PROGRESS NOTES
[facility] - Outpatient Consultation  Chente Rodrigues 68 y.o. female MRN: [id number]  Encounter: [id number]          ASSESSMENT AND PLAN:      1. 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([address])  -     FL barium swallow; Future; Expected date: 10/20/2023    2. Dysphagia, unspecified type  -     Ambulatory Referral to Gastroenterology  -     FL barium swallow; Future; Expected date: 10/20/2023      Personal history of left parotid surgery for cancer and also history of radiation and chemotherapy, extensive neck dissection on the left side, has occasional sensation of dysphagia, recent thyroid ultrasound was unremarkable. Has some regurgitation of fluid through the nose suggesting dysmotility and or scar tissue. Given altered anatomy and significant scarring, will start evaluation with a barium swallow also pill study to evaluate the anatomy better, if need be, will consider EGD for further evaluation. She will call us after barium swallow study. In the meanwhile, soft diet, diet aspiration measures discussed. .\    ______________________________________________________________________    HPI:      Patient has a history of cancer of the parotid, hide left parotid surgery chemoradiation 10 years ago, has clinically done well since, follow-up with her surgical oncologist annually, has had some dryness of the mouth but not that bad, complains of some difficulty swallowing, food sticking in the throat and chest, sometimes she regurgitates and also bring fluids through the neck. Appetite is fair weight stable, denies attending melena hematochezia bowels are irregular. Denies excessive heartburn indigestion concerned about thyroid issues. No history of CVA stents pacemakers. Diet medications more than 10 systems reviewed. REVIEW OF SYSTEMS:    CONSTITUTIONAL: Denies any fever, chills, rigors, and weight loss. HEENT: No earache or tinnitus. CARDIOVASCULAR: No chest pain or palpitations.    RESPIRATORY: Denies any cough, hemoptysis, shortness of breath or dyspnea on exertion. GASTROINTESTINAL: As noted in the History of Present Illness. GENITOURINARY: Denies any hematuria or dysuria. NEUROLOGIC: No dizziness or vertigo. MUSCULOSKELETAL: Denies any joint swellings. SKIN: Denies skin rashes or itching. ENDOCRINE: Denies excessive thirst. Denies intolerance to heat or cold. PSYCHOSOCIAL: Denies depression or anxiety. Denies any recent memory loss. Historical Information   Past Medical History:   Diagnosis Date   • Cancer ([address])    • Goiter      Past Surgical History:   Procedure Laterality Date   • COLONOSCOPY     • HERNIA REPAIR     • HIP SURGERY Bilateral    • HIP SURGERY Bilateral    • HYSTERECTOMY     • PAROTIDECTOMY     • REPLACEMENT TOTAL KNEE BILATERAL Left    • SKIN BIOPSY     • UPPER GASTROINTESTINAL ENDOSCOPY       Social History   Social History     Substance and Sexual Activity   Alcohol Use Never     Social History     Substance and Sexual Activity   Drug Use Never     Social History     Tobacco Use   Smoking Status Never   Smokeless Tobacco Never     Family History   Problem Relation Age of Onset   • No Known Problems Mother    • Parkinsonism Father    • Diabetes Sister    • Dementia Maternal Grandmother    • Bilateral breast cancer Daughter        Meds/Allergies       Current Outpatient Medications:   •  aspirin 81 mg chewable tablet  •  fosinopril (MONOPRIL) 40 mg tablet  •  levothyroxine 112 mcg tablet  •  PARoxetine (PAXIL) 20 mg tablet  •  triamterene-hydrochlorothiazide (DYAZIDE) 37.5-25 mg per capsule    Allergies   Allergen Reactions   • Amoxicillin Swelling           Objective     Blood pressure 112/73, pulse 91, height 5' 10" (1.778 m), weight 79.5 kg (175 lb 3.2 oz). Body mass index is 25.14 kg/m². PHYSICAL EXAM:      General Appearance:   Alert, cooperative, no distress   HEENT:   Normocephalic, atraumatic, anicteric.      Neck:  Supple, symmetrical, trachea midline Lungs:   Clear to auscultation bilaterally; no rales, rhonchi or wheezing; respirations unlabored    Heart[de-identified]   Regular rate and rhythm; no murmur. Abdomen:   Soft, non-tender, non-distended; normal bowel sounds; no masses, no organomegaly    Genitalia:   Deferred    Rectal:   Deferred    Extremities:  No cyanosis, clubbing or edema    Skin:  No jaundice, rashes, or lesions    Lymph nodes:  No palpable cervical lymphadenopathy        Lab Results:   No visits with results within 1 Day(s) from this visit.    Latest known visit with results is:   Appointment on 06/16/2023   Component Date Value   • WBC 06/16/2023 5.94    • RBC 06/16/2023 4.69    • Hemoglobin 06/16/2023 13.6    • Hematocrit 06/16/2023 42.6    • MCV 06/16/2023 91    • MCH 06/16/2023 29.0    • MCHC 06/16/2023 31.9    • RDW 06/16/2023 13.5    • MPV 06/16/2023 10.5    • Platelets 83/75/4553 263    • nRBC 06/16/2023 0    • Neutrophils Relative 06/16/2023 75    • Immat GRANS % 06/16/2023 0    • Lymphocytes Relative 06/16/2023 14    • Monocytes Relative 06/16/2023 8    • Eosinophils Relative 06/16/2023 2    • Basophils Relative 06/16/2023 1    • Neutrophils Absolute 06/16/2023 4.46    • Immature Grans Absolute 06/16/2023 0.02    • Lymphocytes Absolute 06/16/2023 0.81    • Monocytes Absolute 06/16/2023 0.49    • Eosinophils Absolute 06/16/2023 0.10    • Basophils Absolute 06/16/2023 0.06    • Sodium 06/16/2023 136    • Potassium 06/16/2023 4.7    • Chloride 06/16/2023 101    • CO2 06/16/2023 34 (H)    • ANION GAP 06/16/2023 1 (L)    • BUN 06/16/2023 12    • Creatinine 06/16/2023 0.90    • Glucose, Fasting 06/16/2023 98    • Calcium 06/16/2023 9.9    • AST 06/16/2023 15    • ALT 06/16/2023 17    • Alkaline Phosphatase 06/16/2023 86    • Total Protein 06/16/2023 7.4    • Albumin 06/16/2023 3.9    • Total Bilirubin 06/16/2023 0.78    • eGFR 06/16/2023 64    • Cholesterol 06/16/2023 198    • Triglycerides 06/16/2023 55    • HDL, Direct 06/16/2023 75    • LDL Calculated 06/16/2023 112 (H)    • Non-HDL-Chol (CHOL-HDL) 06/16/2023 123    • TSH 3RD [facility] 06/16/2023 0.961          Radiology Results:   US thyroid    Result Date: 10/20/2023  Narrative: THYROID ULTRASOUND INDICATION:    E03.9: Hypothyroidism, unspecified. COMPARISON:  None TECHNIQUE:   Ultrasound of the thyroid was performed with a high frequency linear transducer in transverse and sagittal planes including volumetric imaging sweeps as well as traditional still imaging technique. FINDINGS: Thyroid parenchyma is diffusely heterogeneous in echotexture. No hypervascularity. Right lobe: 4.1 x 1.3 x 1.3 cm. Volume 3.4 mL Left lobe:  3.3 x 1.3 x 1.9 cm. Volume 3.9 mL Isthmus: 0.2  cm. Nodule #1. Image 29. Right isthmus nodule measuring 0.7 x 0.5 x 0.6 cm. No priors for comparison. COMPOSITION: 2 points, solid or almost completely solid. ECHOGENICITY: 2 points, hypoechoic. SHAPE: 0 points, wider-than-tall. MARGIN: 0 points, smooth. ECHOGENIC FOCI: 0 points, none or large comet-tail artifacts. TI-RADS Classification: TR 4 (4-6 points), FNA if > 1.5 cm. Follow if > 1cm. Impression: Very heterogeneous thyroid. No nodule meets current ACR criteria for requiring biopsy or followup ultrasounds. Reference: ACR Thyroid Imaging, Reporting and Data System (TI-RADS): White Paper of the [employer]  J AM Waldo Radiol [id number]. (additional recommendations based on American Thyroid Association 2015 guidelines.) Workstation performed: [id number]

## 2023-10-25 ENCOUNTER — TELEPHONE (OUTPATIENT)
Dept: RADIOLOGY | Facility: HOSPITAL | Age: 73
End: 2023-10-25

## 2023-11-06 ENCOUNTER — TELEPHONE (OUTPATIENT)
Dept: GASTROENTEROLOGY | Facility: CLINIC | Age: 73
End: 2023-11-06

## 2023-11-06 ENCOUNTER — APPOINTMENT (OUTPATIENT)
Dept: RADIOLOGY | Facility: HOSPITAL | Age: 73
End: 2023-11-06
Payer: MEDICARE

## 2023-11-06 ENCOUNTER — HOSPITAL ENCOUNTER (OUTPATIENT)
Dept: RADIOLOGY | Facility: HOSPITAL | Age: 73
Discharge: HOME | End: 2023-11-06
Attending: INTERNAL MEDICINE
Payer: MEDICARE

## 2023-11-06 ENCOUNTER — TELEPHONE (OUTPATIENT)
Age: 73
End: 2023-11-06

## 2023-11-06 DIAGNOSIS — T17.908A ASPIRATION INTO AIRWAY, INITIAL ENCOUNTER: Primary | ICD-10-CM

## 2023-11-06 DIAGNOSIS — R13.10 DYSPHAGIA, UNSPECIFIED TYPE: ICD-10-CM

## 2023-11-06 DIAGNOSIS — C07 CANCER OF PAROTID GLAND (HCC): ICD-10-CM

## 2023-11-06 PROCEDURE — 74220 X-RAY XM ESOPHAGUS 1CNTRST: CPT

## 2023-11-06 NOTE — TELEPHONE ENCOUNTER
Patients GI provider:  Dr. Bee Ga     Number to return call: [phone]    Reason for call: Acosta Rivera from Radiology calling to report significant findings on the patient barium swallow performed today.  Ordered by Dr. Bee Ga     Scheduled procedure/appointment date if applicable: Apt/procedure - n/a

## 2023-11-06 NOTE — TELEPHONE ENCOUNTER
Received notification that video swallow study results were urgent while on Tasks   Chart and barium swallow reviewed, showed negrita aspiration   I called patient and we spoke and reviewed results   She expressed understanding to results   All questions answered   Will order barium swallow study with SPEECH   Message sent to Dr. Tyree Greene and Clerical staff   I discussed aspiration precautions with the patient who expressed understanding     Patient was instructed to call the office with any questions, concerns, new/ worsening/ persisting GI symptoms.

## 2023-11-08 NOTE — TELEPHONE ENCOUNTER
Called patient, no answer, left message to call back to update on how she is feeling post recent swallowing study.

## 2023-11-08 NOTE — TELEPHONE ENCOUNTER
Patients GI provider:  Dr. Gerald Rebolledo    Number to return call: [phone]    Reason for call: Pt returning call. Patient states she is feeling ok. They couldn't finish the test due to the liquid going down the wrong pipe.      Scheduled procedure/appointment date if applicable: N/A

## 2023-11-10 NOTE — TELEPHONE ENCOUNTER
Spoke with pt, confirmed she is not having any respiratory symptoms, denies fever, chills, cough. Offers no c/o at this time.

## 2023-11-14 ENCOUNTER — HOSPITAL ENCOUNTER (OUTPATIENT)
Dept: RADIOLOGY | Facility: HOSPITAL | Age: 73
Discharge: HOME | End: 2023-11-14
Payer: MEDICARE

## 2023-11-14 DIAGNOSIS — T17.908A ASPIRATION INTO AIRWAY, INITIAL ENCOUNTER: ICD-10-CM

## 2023-11-14 DIAGNOSIS — R13.10 DYSPHAGIA, UNSPECIFIED TYPE: ICD-10-CM

## 2023-11-14 PROCEDURE — 92611 MOTION FLUOROSCOPY/SWALLOW: CPT

## 2023-11-14 PROCEDURE — 74230 X-RAY XM SWLNG FUNCJ C+: CPT

## 2023-11-14 NOTE — PROCEDURES
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 female referred by her Gastroenterologist (Dr Rosita Glass) for Videofluoroscopic Swallowing Study following "negrita aspiration into the trachea and tracheobronchial tree " on a Barium Swallow/Esophagram completed 11/6. Her PMH is remarkable for left parotid surgery for cancer (is s/p radiation and chemotherapy, extensive neck dissection on the left side, removal of 50 lymph nodes per [facility]). Review of available Radiology records revealed results of XR of cervical spine of 9/25/19:   -No evidence of fracture. -Reversal of the normal cervical lordosis, as can be seen with muscle spasm, the result of patient positioning or possibly related to degenerative disease. 5 mm of anterolisthesis of C7 on T1.  -Generalized degenerative disc disease, most severe at C5-C6 and C6-C7, where there are large dense osteophytes. Degenerative facet disease throughout the cervical spine, most severe at C7-T1. No evidence of destructive lesion.  -The prevertebral soft tissues are within normal limits.    -The lung apices are clear.     Current reported concerns for dysphagia include:   -Difficulty swallowing and needing to ingest 2 to 3 glasses of liquids to wash food down  -Food sticking in the throat and chest  -Dryness of the mouth  -Occasions of nasal regurgitation with liquid  [facility] endorsed coughing on food/liquids when asked but denied any h/o recurrent upper respiratory infections, pneumonia or weight loss. A VFSS was recommended to assess oropharyngeal stage swallowing skills at this time. Nayeli Danielson was viewed in lateral position and assessed with thin and nectar/mildly thick liquid (tsp and straw sips), tsps of honey/moderately thick liquid, puree/pudding, soft moist food/canned peaches,and a13mm pill. Impressions follow:      Oral stage:  Mild oral stage dysphagia. Mastication was cautious and effective with peaches. Bolus formation and transfer were functional with foods  Oral control (tongue control during bolus "hold") was impaired. Thin liquid spilled deeply to the hypopharynx (and into laryngeal inlet x1). I suspect impaired sensation related to radiation effects as a contributor to this symptom. Pharyngeal stage:  SIGNIFICANT pharyngeal dysphagia. Velar elevation/closure was NOT consistent  Laryngeal elevation and anterior hyoid excursion were both impaired. Epiglottic movement was impaired (I presume related to both minimal anterior hyoid excursion plus large osteophytes_  Laryngeal vestibule or airway entrance closure was significantly compromised. Tongue base retraction and pharyngeal stripping wave was diminished. Narrow or limited PES opening noted:     Management of food/liquid/barium tablet follows: Thin liquid:   -Teaspoon boluses spilled to the hypopharynx with small amount spilling into the laryngeal inlet before the swallow x1. Moderate residue throughout the pharynx noted  Despite multiple swallows, mild overflow aspiration occurred repeatedly.    -With larger boluses (example by cup and straw), with no significant oral spillage noted but residue and mild overflow aspiration persisted. Nasal regurgitation also occurred when she took a sip of liquid when attempting to "wash down" pill.   Note: Use of "chin down and left head turn" lessened but did not eliminate all overflow aspiration and risk. Nectar thick liquid:   -By teaspoon and straw sips: when " chin down and left head turn" utilized, with larger boluses passed through the PES. Transient penetration during successive swallows noted, but no aspiration observed during filming today. During filming with nectar thick liquid with use of strategies, Lucila Daugherty reported sensation that use of chin down and left head turn significantly improved swallowing and she began to spontaneously used the strategies. Honey thick liquid by teaspoon: At least moderate (or slightly increased) residue was noted in the pharynx after the primary swallow. Lucila Daugherty spontaneously completed MULTIPLE swallows which lessened residue over time. Similar to nectar thick liquid, some transient overflow laryngeal penetration occurred during successive swallows but no aspiration occurred with honey/moderately thick liquid. Purée/pudding[de-identified] When strategies were used, severe vallecular residue was noted after the primary swallow. With each successive swallow, small amounts of material passed through the pharynx and esophagus. Moderate amount of residue persisted in the pharynx after 4 swallows. Administration of nectar thick liquid with use of strategies further lessened food residue. When Lucila Daugherty was instructed to take purée without strategy use, no puree passed beyond the vallecula or epiglottis with the primary swallow. Lucila Daugherty spontaneous certainly completed multiple effortful swallows and passed small amounts with each attempt. She was then allowed to use liquid "wash down" to further clear residue. When thin liquids were utilized, mild overflow aspiration occurred despite use of "chin down into the left."    Peaches: Similar to purée but with increased amount of overflow of material to the level of the vocal cords. 13 mm pill: The pill was initially administered with nectar thick liquid with use of strategies.   Despite strategy use, the pill remain retained in the hypopharynx/at the level of the UES. When he was provided with ample amounts of nectar thick followed by thin liquid but pill stasis persisted (and despite use of neck flexion left head turn effortful swallows). After being given pudding/purée, the and when multiple strategies were used, the pill eventually cleared from the pharynx. Strategies and Efficacy: see above    Response and Laryngeal penetration and Aspiration:   Throat clearing or "light cough "was present when laryngeal penetration occurred with peaches (and peaches were cleared from the inlet to the larynx.)  However, when laryngeal penetration and mild but recurrent aspiration occurred with thin liquid, throat clearing or cough were not sufficiently strong to clear aspirated material    Esophageal stage:  Brief view of esophagus was not completed today (time and filming limits reaches). See Barium Swallow reprot (no obstruction to flow with minimum amount administered). Assessment Summary:    Kendra Braun presented with s/s significant pharyngeal dysphagia as detailed above (1* characteristics: impaired mechanics s/p resection and RADIATION as well as presence of osteophytes with SIGNIFICANT residue with food/liquid and risk for overflow aspiration. +Mild recurrent aspiration with thin liquids today)  Note: Images are available for review in PACS as desired. Recommendations:   Recommended Diet:    -MOIST food that is very well cut and preferable soft cooked.    - Consider nectar thick liquids to "wash down" food residue in pharynx/throat during meals    Recommended Form of Medications: crushed with puree   Aspiration precautions and Swallowing strategies:   "Chin down and turn head to the left" to swallow all food and liquid  -Eat slowly so there is time to swallow 4-5 times per bite and swallow with effort  -"Clear your throat" or cough with any "tickle in your throat" (Clear food or liquid ASAP do it does not descent into your lungs). -Consider "washing down food" with nectar thick liquids (not thin liquid)  -Complete oral care/brush teeth well after all meals (rid of bacteria) and take thin liquid (water) between meals    Note: Following testing, I reviewed images and impressions with Janeth Stanton. We discussed the option of Speech/Swallowing therapy as an outpatient. Janeth Stanton recalled recommendations and stated that she did not feel that she was in need of OP therapy at this time. I recommended she consider additional education and therapy should she be experiencing any medical consequences (eg weight loss, upper respiratory infections or pneumonia) now or in the future (after MD provides order,  OP therapy appointments can be set up at [phone]). Furthermore, I spoke of option of additional ENT evaluation (MD of her choice but I mentioned Dr Sara Gillis with [facility] (ENT) and included her number [phone]  should she be of interest now or in future. Thank you for this referral.  Please do not hesitate to contact me with any questions or concerns.     Jaci Tapia [facility]   Speech Pathologist  [place] License [id number]  [place] License [id number]  Available via [facility] Text    Office Phone: [phone]

## 2023-12-11 ENCOUNTER — RA CDI HCC (OUTPATIENT)
Dept: OTHER | Facility: HOSPITAL | Age: 73
End: 2023-12-11

## 2023-12-11 ENCOUNTER — TELEPHONE (OUTPATIENT)
Dept: ENDOCRINOLOGY | Facility: CLINIC | Age: 73
End: 2023-12-11

## 2023-12-11 DIAGNOSIS — E03.9 ACQUIRED HYPOTHYROIDISM: Primary | ICD-10-CM

## 2023-12-11 RX ORDER — LEVOTHYROXINE SODIUM 112 UG/1
112 TABLET ORAL DAILY
Qty: 90 TABLET | Refills: 1 | Status: SHIPPED | OUTPATIENT
Start: 2023-12-11

## 2023-12-14 ENCOUNTER — OFFICE VISIT (OUTPATIENT)
Dept: FAMILY MEDICINE CLINIC | Facility: CLINIC | Age: 73
End: 2023-12-14
Payer: MEDICARE

## 2023-12-14 VITALS
RESPIRATION RATE: 18 BRPM | BODY MASS INDEX: 24.2 KG/M2 | WEIGHT: 169 LBS | SYSTOLIC BLOOD PRESSURE: 114 MMHG | TEMPERATURE: 97.2 F | HEART RATE: 71 BPM | HEIGHT: 70 IN | OXYGEN SATURATION: 97 % | DIASTOLIC BLOOD PRESSURE: 72 MMHG

## 2023-12-14 DIAGNOSIS — Z23 ENCOUNTER FOR IMMUNIZATION: ICD-10-CM

## 2023-12-14 DIAGNOSIS — Z11.59 ENCOUNTER FOR HEPATITIS C SCREENING TEST FOR LOW RISK PATIENT: ICD-10-CM

## 2023-12-14 DIAGNOSIS — I10 ESSENTIAL HYPERTENSION: Primary | ICD-10-CM

## 2023-12-14 DIAGNOSIS — E03.8 OTHER SPECIFIED HYPOTHYROIDISM: ICD-10-CM

## 2023-12-14 DIAGNOSIS — F41.9 ANXIETY: ICD-10-CM

## 2023-12-14 PROCEDURE — 99214 OFFICE O/P EST MOD 30 MIN: CPT | Performed by: INTERNAL MEDICINE

## 2023-12-14 PROCEDURE — G0008 ADMIN INFLUENZA VIRUS VAC: HCPCS

## 2023-12-14 PROCEDURE — 90662 IIV NO PRSV INCREASED AG IM: CPT

## 2023-12-14 NOTE — ASSESSMENT & PLAN NOTE
Well controlled and will continue current medications. If patient continues to have purposeful weight loss, and develops any orthostatic symptoms, she was counselled to return or call for possible titration of bp medications.

## 2023-12-14 NOTE — PROGRESS NOTES
Name: Kamar Zepeda      : 1950      MRN: [id number]  Encounter Provider: Ruddy Gaming MD  Encounter Date: 2023   Encounter department: 2 [facility]     1. Essential hypertension  Assessment & Plan:  Well controlled and will continue current medications. If patient continues to have purposeful weight loss, and develops any orthostatic symptoms, she was counselled to return or call for possible titration of bp medications. Orders:  -     CBC and differential; Future  -     Comprehensive metabolic panel; Future  -     Lipid panel; Future    2. Anxiety  Assessment & Plan:  Managed by psychiatry and she feels this is stable. 3. Other specified hypothyroidism  Assessment & Plan:  Clinically euthyroid and will check labs prior to follow up visit in 6 months. Orders:  -     TSH, 3rd generation with Free T4 reflex; Future    4. Encounter for hepatitis C screening test for low risk patient  -     Hepatitis C antibody; Future    5. Encounter for immunization  -     influenza vaccine, high-dose, PF 0.7 mL (FLUZONE HIGH-DOSE)        Depression Screening and Follow-up Plan: Patient was screened for depression during today's encounter. They screened negative with a PHQ-2 score of 0. Subjective     Here for follow up visit. She is feeling well. She gave up soda and has cut back on her food intake and has lost 6 pounds. There is no cold or heat intolerance, diarrhea or constipation, hair or skin changes, unanticipated weight gain or loss. Feels her anxiety is well controlled and she does not feel she needs to make any changes; continues to follow with her specialist.      Review of Systems   Constitutional: Negative. Respiratory: Negative. Cardiovascular: Negative. Psychiatric/Behavioral: Negative.          Past Medical History:   Diagnosis Date   • Cancer ([address])    • Goiter      Past Surgical History:   Procedure Laterality Date   • COLONOSCOPY • HERNIA REPAIR     • HIP SURGERY Bilateral    • HIP SURGERY Bilateral    • HYSTERECTOMY     • PAROTIDECTOMY     • REPLACEMENT TOTAL KNEE BILATERAL Left    • SKIN BIOPSY     • UPPER GASTROINTESTINAL ENDOSCOPY       Family History   Problem Relation Age of Onset   • No Known Problems Mother    • Parkinsonism Father    • Diabetes Sister    • Dementia Maternal Grandmother    • Bilateral breast cancer Daughter      Social History     Socioeconomic History   • Marital status: /Civil Union     Spouse name: None   • Number of children: 1   • Years of education: None   • Highest education level: None   Occupational History   • None   Tobacco Use   • Smoking status: Never   • Smokeless tobacco: Never   Vaping Use   • Vaping status: Never Used   Substance and Sexual Activity   • Alcohol use: Never   • Drug use: Never   • Sexual activity: Not Currently     Partners: Male     Birth control/protection: Abstinence   Other Topics Concern   • None   Social History Narrative   • None     Social Determinants of Health     Financial Resource Strain: Low Risk  (6/5/2023)    Overall Financial Resource Strain (CARDIA)    • Difficulty of Paying Living Expenses: Not very hard   Food Insecurity: Not on file   Transportation Needs: No Transportation Needs (6/5/2023)    PRAPARE - Transportation    • Lack of Transportation (Medical): No    • Lack of Transportation (Non-Medical):  No   Physical Activity: Not on file   Stress: Not on file   Social Connections: Not on file   Intimate Partner Violence: Not on file   Housing Stability: Not on file     Current Outpatient Medications on File Prior to Visit   Medication Sig   • aspirin 81 mg chewable tablet Chew 81 mg daily   • fosinopril (MONOPRIL) 40 mg tablet TAKE 1 TABLET BY MOUTH EVERY DAY   • levothyroxine 112 mcg tablet Take 1 tablet (112 mcg total) by mouth daily   • PARoxetine (PAXIL) 20 mg tablet Take 20 mg by mouth daily   • triamterene-hydrochlorothiazide (DYAZIDE) 37.5-25 mg per capsule TAKE 1 CAPSULE BY MOUTH EVERY DAY IN THE MORNING     Allergies   Allergen Reactions   • Amoxicillin Swelling     Immunization History   Administered Date(s) Administered   • COVID-19 MODERNA VACC 0.5 ML IM 03/11/2021, 04/13/2021   • INFLUENZA 10/15/2022   • Zoster Vaccine Recombinant 09/17/2019, 12/14/2019       Objective     /72   Pulse 71   Temp (!) 97.2 °F (36.2 °C)   Resp 18   Ht 5' 10" (1.778 m)   Wt 76.7 kg (169 lb)   SpO2 97%   BMI 24.25 kg/m²     Physical Exam  Constitutional:       Appearance: She is well-developed. Eyes:      Conjunctiva/sclera: Conjunctivae normal.   Neck:      Thyroid: No thyromegaly. Vascular: No JVD. Cardiovascular:      Rate and Rhythm: Normal rate and regular rhythm. Heart sounds: Normal heart sounds. No murmur heard. No friction rub. No gallop. Pulmonary:      Effort: Pulmonary effort is normal.      Breath sounds: Normal breath sounds. No wheezing or rales. Abdominal:      General: Bowel sounds are normal. There is no distension. Palpations: Abdomen is soft. Tenderness: There is no abdominal tenderness. Musculoskeletal:      Cervical back: Neck supple. Psychiatric:         Mood and Affect: Mood normal.         Behavior: Behavior normal.         Thought Content:  Thought content normal.         Judgment: Judgment normal.       Raymond Bright MD

## 2023-12-27 ENCOUNTER — APPOINTMENT (OUTPATIENT)
Dept: LAB | Facility: CLINIC | Age: 73
End: 2023-12-27
Payer: MEDICARE

## 2023-12-27 DIAGNOSIS — Z11.59 ENCOUNTER FOR HEPATITIS C SCREENING TEST FOR LOW RISK PATIENT: ICD-10-CM

## 2023-12-27 DIAGNOSIS — E03.8 OTHER SPECIFIED HYPOTHYROIDISM: ICD-10-CM

## 2023-12-27 DIAGNOSIS — I10 ESSENTIAL HYPERTENSION: ICD-10-CM

## 2023-12-27 LAB
ALBUMIN SERPL BCP-MCNC: 4.2 G/DL (ref 3.5–5)
ALP SERPL-CCNC: 70 U/L (ref 34–104)
ALT SERPL W P-5'-P-CCNC: 11 U/L (ref 7–52)
ANION GAP SERPL CALCULATED.3IONS-SCNC: 7 MMOL/L
AST SERPL W P-5'-P-CCNC: 16 U/L (ref 13–39)
BASOPHILS # BLD AUTO: 0.07 THOUSANDS/ÂΜL (ref 0–0.1)
BASOPHILS NFR BLD AUTO: 2 % (ref 0–1)
BILIRUB SERPL-MCNC: 0.63 MG/DL (ref 0.2–1)
BUN SERPL-MCNC: 12 MG/DL (ref 5–25)
CALCIUM SERPL-MCNC: 9.8 MG/DL (ref 8.4–10.2)
CHLORIDE SERPL-SCNC: 97 MMOL/L (ref 96–108)
CHOLEST SERPL-MCNC: 204 MG/DL
CO2 SERPL-SCNC: 36 MMOL/L (ref 21–32)
CREAT SERPL-MCNC: 0.77 MG/DL (ref 0.6–1.3)
EOSINOPHIL # BLD AUTO: 0.16 THOUSAND/ÂΜL (ref 0–0.61)
EOSINOPHIL NFR BLD AUTO: 5 % (ref 0–6)
ERYTHROCYTE [DISTWIDTH] IN BLOOD BY AUTOMATED COUNT: 13 % (ref 11.6–15.1)
GFR SERPL CREATININE-BSD FRML MDRD: 77 ML/MIN/1.73SQ M
GLUCOSE P FAST SERPL-MCNC: 90 MG/DL (ref 65–99)
HCT VFR BLD AUTO: 41.5 % (ref 34.8–46.1)
HCV AB SER QL: NORMAL
HDLC SERPL-MCNC: 78 MG/DL
HGB BLD-MCNC: 13.7 G/DL (ref 11.5–15.4)
IMM GRANULOCYTES # BLD AUTO: 0.01 THOUSAND/UL (ref 0–0.2)
IMM GRANULOCYTES NFR BLD AUTO: 0 % (ref 0–2)
LDLC SERPL CALC-MCNC: 113 MG/DL (ref 0–100)
LYMPHOCYTES # BLD AUTO: 1.06 THOUSANDS/ÂΜL (ref 0.6–4.47)
LYMPHOCYTES NFR BLD AUTO: 33 % (ref 14–44)
MCH RBC QN AUTO: 30 PG (ref 26.8–34.3)
MCHC RBC AUTO-ENTMCNC: 33 G/DL (ref 31.4–37.4)
MCV RBC AUTO: 91 FL (ref 82–98)
MONOCYTES # BLD AUTO: 0.39 THOUSAND/ÂΜL (ref 0.17–1.22)
MONOCYTES NFR BLD AUTO: 12 % (ref 4–12)
NEUTROPHILS # BLD AUTO: 1.57 THOUSANDS/ÂΜL (ref 1.85–7.62)
NEUTS SEG NFR BLD AUTO: 48 % (ref 43–75)
NONHDLC SERPL-MCNC: 126 MG/DL
NRBC BLD AUTO-RTO: 0 /100 WBCS
PLATELET # BLD AUTO: 264 THOUSANDS/UL (ref 149–390)
PMV BLD AUTO: 9.9 FL (ref 8.9–12.7)
POTASSIUM SERPL-SCNC: 4.1 MMOL/L (ref 3.5–5.3)
PROT SERPL-MCNC: 7 G/DL (ref 6.4–8.4)
RBC # BLD AUTO: 4.56 MILLION/UL (ref 3.81–5.12)
SODIUM SERPL-SCNC: 140 MMOL/L (ref 135–147)
TRIGL SERPL-MCNC: 64 MG/DL
TSH SERPL DL<=0.05 MIU/L-ACNC: 1.07 UIU/ML (ref 0.45–4.5)
WBC # BLD AUTO: 3.26 THOUSAND/UL (ref 4.31–10.16)

## 2023-12-27 PROCEDURE — 80053 COMPREHEN METABOLIC PANEL: CPT

## 2023-12-27 PROCEDURE — 80061 LIPID PANEL: CPT

## 2023-12-27 PROCEDURE — 84443 ASSAY THYROID STIM HORMONE: CPT

## 2023-12-27 PROCEDURE — 36415 COLL VENOUS BLD VENIPUNCTURE: CPT

## 2023-12-27 PROCEDURE — 85025 COMPLETE CBC W/AUTO DIFF WBC: CPT

## 2023-12-27 PROCEDURE — 86803 HEPATITIS C AB TEST: CPT

## 2024-01-23 ENCOUNTER — OFFICE VISIT (OUTPATIENT)
Dept: ENDOCRINOLOGY | Facility: CLINIC | Age: 74
End: 2024-01-23
Payer: MEDICARE

## 2024-01-23 VITALS
HEIGHT: 70 IN | DIASTOLIC BLOOD PRESSURE: 80 MMHG | OXYGEN SATURATION: 98 % | WEIGHT: 165.85 LBS | HEART RATE: 88 BPM | SYSTOLIC BLOOD PRESSURE: 130 MMHG | BODY MASS INDEX: 23.74 KG/M2

## 2024-01-23 DIAGNOSIS — E03.8 OTHER SPECIFIED HYPOTHYROIDISM: Primary | ICD-10-CM

## 2024-01-23 PROCEDURE — 99213 OFFICE O/P EST LOW 20 MIN: CPT | Performed by: NURSE PRACTITIONER

## 2024-01-23 NOTE — ASSESSMENT & PLAN NOTE
Clinically and biochemically euthyroid. Continue levothyroxine 112 mcg daily. Recheck thyroid function tests a few days before next follow up in six months, or sooner if symptomatic.     Component      Latest Ref Rng 12/27/2023   TSH 3RD GENERATON      0.450 - 4.500 uIU/mL 1.073

## 2024-01-23 NOTE — PROGRESS NOTES
Established Patient Progress Note     CC: Endocrinology follow up visit    Impression & Plan:    Problem List Items Addressed This Visit          Endocrine    Other specified hypothyroidism - Primary     Clinically and biochemically euthyroid. Continue levothyroxine 112 mcg daily. Recheck thyroid function tests a few days before next follow up in six months, or sooner if symptomatic.     Component      Latest Ref Rng 12/27/2023   TSH 3RD GENERATON      0.450 - 4.500 uIU/mL 1.073               Relevant Orders    TSH, 3rd generation    T4, free       History of Present Illness:     Kelly Lind is a 74 y.o. female with a history of hypothyroidism diagnosed approximately 20 years ago. She has a history of parotid cancer diagnosed in 2011, s/p surgery including lymph node dissection and s/p chemoradiation with extensive scarring to the left neck.     Currently on levothyroxine 112 mcg daily, taken regularly or properly. Denies change in energy level or weight.  Denies changes in level of concentration or ability to fall or stay asleep.  Denies anxiety, jitteriness, or tremors.  Denies tachycardia or palpitations.  Denies constipation or hyperdefecation.  Denies frequent headaches.  Denies temperature intolerances.       Patient Active Problem List   Diagnosis    Essential hypertension    Other specified hypothyroidism    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(HCC)    Meniere's disease of both ears    Anxiety    Dysphagia      Past Medical History:   Diagnosis Date    Cancer (HCC)     Goiter       Past Surgical History:   Procedure Laterality Date    COLONOSCOPY      HERNIA REPAIR      HIP SURGERY Bilateral     HIP SURGERY Bilateral     HYSTERECTOMY      PAROTIDECTOMY      REPLACEMENT TOTAL KNEE BILATERAL Left     SKIN BIOPSY      UPPER GASTROINTESTINAL ENDOSCOPY        Family History   Problem Relation Age of Onset    No Known Problems Mother     Parkinsonism Father     Diabetes Sister     Dementia Maternal Grandmother      "Bilateral breast cancer Daughter      Social History     Tobacco Use    Smoking status: Never    Smokeless tobacco: Never   Substance Use Topics    Alcohol use: Never     Allergies   Allergen Reactions    Amoxicillin Swelling       Current Outpatient Medications:     aspirin 81 mg chewable tablet, Chew 81 mg daily, Disp: , Rfl:     fosinopril (MONOPRIL) 40 mg tablet, TAKE 1 TABLET BY MOUTH EVERY DAY, Disp: 90 tablet, Rfl: 3    levothyroxine 112 mcg tablet, Take 1 tablet (112 mcg total) by mouth daily, Disp: 90 tablet, Rfl: 1    PARoxetine (PAXIL) 20 mg tablet, Take 20 mg by mouth daily, Disp: , Rfl:     triamterene-hydrochlorothiazide (DYAZIDE) 37.5-25 mg per capsule, TAKE 1 CAPSULE BY MOUTH EVERY DAY IN THE MORNING, Disp: 90 capsule, Rfl: 3    Review of Systems  See HPI.   All other systems reviewed and are negative.      Physical Exam:  Body mass index is 23.8 kg/m².  /80 (BP Location: Left arm, Patient Position: Sitting, Cuff Size: Large)   Pulse 88   Ht 5' 10\" (1.778 m)   Wt 75.2 kg (165 lb 13.6 oz)   SpO2 98%   BMI 23.80 kg/m²    Wt Readings from Last 3 Encounters:   01/23/24 75.2 kg (165 lb 13.6 oz)   12/14/23 76.7 kg (169 lb)   10/20/23 79.5 kg (175 lb 3.2 oz)       Physical Exam   Constitutional: She is oriented to person, place, and time. She appears well-developed and well-nourished. No distress.   HENT:   Head: Normocephalic and atraumatic.   Neck: Normal range of motion.   Pulmonary/Chest: Effort normal.   Musculoskeletal: Normal range of motion.   Neurological: She is alert and oriented to person, place, and time.   Skin: She is not diaphoretic.   Psychiatric: She has a normal mood and affect. Her behavior is normal.       Discussed with the patient and all questioned fully answered. She will call me if any problems arise.    Follow-up appointment in 3 months.     Counseled patient on diagnostic results, prognosis, risk and benefit of treatment options, instruction for management, importance " of treatment compliance, Risk  factor reduction and impressions    GEE PanchalNP

## 2024-02-06 DIAGNOSIS — I10 ESSENTIAL HYPERTENSION: ICD-10-CM

## 2024-02-06 RX ORDER — TRIAMTERENE AND HYDROCHLOROTHIAZIDE 37.5; 25 MG/1; MG/1
CAPSULE ORAL
Qty: 90 CAPSULE | Refills: 1 | Status: SHIPPED | OUTPATIENT
Start: 2024-02-06

## 2024-06-05 DIAGNOSIS — E03.9 ACQUIRED HYPOTHYROIDISM: ICD-10-CM

## 2024-06-05 DIAGNOSIS — I10 ESSENTIAL HYPERTENSION: ICD-10-CM

## 2024-06-05 RX ORDER — LEVOTHYROXINE SODIUM 112 UG/1
112 TABLET ORAL DAILY
Qty: 90 TABLET | Refills: 1 | Status: SHIPPED | OUTPATIENT
Start: 2024-06-05

## 2024-06-05 RX ORDER — FOSINOPRIL SODIUM 40 MG/1
TABLET ORAL
Qty: 90 TABLET | Refills: 1 | Status: SHIPPED | OUTPATIENT
Start: 2024-06-05

## 2024-07-12 ENCOUNTER — APPOINTMENT (OUTPATIENT)
Dept: LAB | Facility: CLINIC | Age: 74
End: 2024-07-12
Payer: MEDICARE

## 2024-07-12 DIAGNOSIS — E03.8 OTHER SPECIFIED HYPOTHYROIDISM: ICD-10-CM

## 2024-07-12 DIAGNOSIS — E03.9 ACQUIRED HYPOTHYROIDISM: ICD-10-CM

## 2024-07-12 LAB
T4 FREE SERPL-MCNC: 1.37 NG/DL (ref 0.61–1.12)
TSH SERPL DL<=0.05 MIU/L-ACNC: 0.22 UIU/ML (ref 0.45–4.5)

## 2024-07-12 PROCEDURE — 36415 COLL VENOUS BLD VENIPUNCTURE: CPT

## 2024-07-12 PROCEDURE — 84443 ASSAY THYROID STIM HORMONE: CPT

## 2024-07-12 PROCEDURE — 84439 ASSAY OF FREE THYROXINE: CPT

## 2024-07-13 DIAGNOSIS — E03.8 OTHER SPECIFIED HYPOTHYROIDISM: Primary | ICD-10-CM

## 2024-07-13 RX ORDER — LEVOTHYROXINE SODIUM 0.1 MG/1
100 TABLET ORAL DAILY
Qty: 90 TABLET | Refills: 2 | Status: SHIPPED | OUTPATIENT
Start: 2024-07-13

## 2024-07-23 ENCOUNTER — OFFICE VISIT (OUTPATIENT)
Dept: ENDOCRINOLOGY | Facility: CLINIC | Age: 74
End: 2024-07-23
Payer: MEDICARE

## 2024-07-23 VITALS
HEIGHT: 70 IN | HEART RATE: 80 BPM | BODY MASS INDEX: 23.34 KG/M2 | SYSTOLIC BLOOD PRESSURE: 118 MMHG | WEIGHT: 163 LBS | OXYGEN SATURATION: 97 % | DIASTOLIC BLOOD PRESSURE: 70 MMHG

## 2024-07-23 DIAGNOSIS — E04.1 THYROID NODULE: ICD-10-CM

## 2024-07-23 DIAGNOSIS — E03.9 ACQUIRED HYPOTHYROIDISM: Primary | ICD-10-CM

## 2024-07-23 DIAGNOSIS — R13.13 PHARYNGEAL DYSPHAGIA: ICD-10-CM

## 2024-07-23 PROCEDURE — G2211 COMPLEX E/M VISIT ADD ON: HCPCS | Performed by: INTERNAL MEDICINE

## 2024-07-23 PROCEDURE — 99214 OFFICE O/P EST MOD 30 MIN: CPT | Performed by: INTERNAL MEDICINE

## 2024-07-23 NOTE — PATIENT INSTRUCTIONS
Continue levothyroxine 100 micrograms orally daily  Repeat thyroid function test as ordered in 6 to 8 weeks from dose change  Repeat thyroid ultrasound earliest in October 2024-ordered

## 2024-07-23 NOTE — PROGRESS NOTES
Kelly Lind 74 y.o. female MRN: [id number]    Encounter: [id number]      Assessment & Plan     Hypothyroidism   Continue levothyroxine 100  mcg orally daily  Repeat thyroid function test in 6 to 8 weeks from dose change    2. Thyroid nodule   Repeat ultrasound to assess interval change in thyroid nodules in 1-5.5 years (earliest in October 2024-ordered)     Follow up in 6 months     CC:  hypothyroidism     History of Present Illness     HPI:  Kelly Lind is a 74 y.o. 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    Last seen in 1/2024  Currently taking levothyroxine 100 mcg orally daily 0 - dose reduced 10 days ago   Compliant, takes on an empty stomach    Lost 40 los over the last 1 year - intentionally, through lifestyle modifications (diet and exercise)   Has been stable for the last 6 months, (lost 10 lbs on office scale between October and January 2024)    As noted tremors in the arms specially on the left side since  Denies any other symptoms of hypo or hypothyroidism     Has pharyngeal dysphagia; met with GI and Speech/swallow therapist ; is following recommendation; will consider ENT evaluation if symptoms worsen     Ultrasound thyroid 10/2023-subcentimeter right-sided hypoechoic nodule.  TR 4; heterogeneous thyroid parenchyma.    All other systems were reviewed and are negative.       Review of Systems    Historical Information   Past Medical History:   Diagnosis Date    Cancer (HCC)     Goiter      Past Surgical History:   Procedure Laterality Date    COLONOSCOPY      HERNIA REPAIR      HIP SURGERY Bilateral     HIP SURGERY Bilateral     HYSTERECTOMY      PAROTIDECTOMY      REPLACEMENT TOTAL KNEE BILATERAL Left     SKIN BIOPSY      UPPER GASTROINTESTINAL ENDOSCOPY       Social History   Social History     Substance and Sexual Activity   Alcohol Use Never     Social History  "    Substance and Sexual Activity   Drug Use Never     Social History     Tobacco Use   Smoking Status Never   Smokeless Tobacco Never     Family History:   Family History   Problem Relation Age of Onset    No Known Problems Mother     Parkinsonism Father     Diabetes Sister     No Known Problems Brother     Esophageal cancer Brother     Dementia Maternal Grandmother     Bilateral breast cancer Daughter        Meds/Allergies   Current Outpatient Medications   Medication Sig Dispense Refill    aspirin 81 mg chewable tablet Chew 81 mg daily      fosinopril (MONOPRIL) 40 mg tablet TAKE 1 TABLET BY MOUTH EVERY DAY 90 tablet 1    levothyroxine 100 mcg tablet Take 1 tablet (100 mcg total) by mouth daily 90 tablet 2    PARoxetine (PAXIL) 20 mg tablet Take 20 mg by mouth daily      triamterene-hydrochlorothiazide (DYAZIDE) 37.5-25 mg per capsule TAKE 1 CAPSULE BY MOUTH EVERY DAY IN THE MORNING 90 capsule 1     No current facility-administered medications for this visit.     Allergies   Allergen Reactions    Amoxicillin Swelling       Objective   Vitals: Blood pressure 118/70, pulse 80, height 5' 10\" (1.778 m), weight 73.9 kg (163 lb), SpO2 97%.    Physical Exam  Constitutional:       Appearance: She is well-developed.   HENT:      Head: Normocephalic and atraumatic.   Eyes:      Conjunctiva/sclera: Conjunctivae normal.   Neck:      Thyroid: No thyromegaly.      Comments: Extensive scarring on the left side of the neck.  Limited exam, no nodules appreciated on thyroid exam.  Nontender  Cardiovascular:      Rate and Rhythm: Normal rate and regular rhythm.      Heart sounds: Normal heart sounds. No murmur heard.  Pulmonary:      Effort: Pulmonary effort is normal.      Breath sounds: Normal breath sounds. No wheezing.   Abdominal:      General: There is no distension.      Palpations: Abdomen is soft.      Tenderness: There is no abdominal tenderness.   Musculoskeletal:         General: Normal range of motion.      Cervical " "back: Normal range of motion and neck supple.   Skin:     General: Skin is warm and dry.   Neurological:      Mental Status: She is alert and oriented to person, place, and time.      Deep Tendon Reflexes: Reflexes normal.      Comments: Fine Tremors on the outstretched arms     Psychiatric:         Behavior: Behavior normal.         The history was obtained from the review of the chart, patient.    Lab Results:   Lab Results   Component Value Date/Time    TSH 3RD GENERATON 0.218 (L) 07/12/2024 07:38 AM    TSH 3RD GENERATON 1.073 12/27/2023 07:14 AM    Free T4 1.37 (H) 07/12/2024 07:38 AM     Lab Results   Component Value Date    WBC 3.26 (L) 12/27/2023    HGB 13.7 12/27/2023    HCT 41.5 12/27/2023    MCV 91 12/27/2023     12/27/2023     Lab Results   Component Value Date    CREATININE 0.77 12/27/2023    BUN 12 12/27/2023    K 4.1 12/27/2023    CL 97 12/27/2023    CO2 36 (H) 12/27/2023     No results found for: \"HGBA1C\"      Imaging Studies:   Results for orders placed during the hospital encounter of 10/12/23    US thyroid    Impression  Very heterogeneous thyroid.  No nodule meets current ACR criteria for requiring biopsy or followup ultrasounds.        Reference: ACR Thyroid Imaging, Reporting and Data System (TI-RADS): White Paper of the ACR TI-RADS Committee. J AM Waldo Radiol 2017;14:587-595. (additional recommendations based on American Thyroid Association 2015 guidelines.)      Workstation performed: [id number]      I have personally reviewed pertinent films in PACS    Portions of the record may have been created with voice recognition software. Occasional wrong word or \"sound a like\" substitutions may have occurred due to the inherent limitations of voice recognition software. Read the chart carefully and recognize, using context, where substitutions have occurred.    "

## 2024-08-01 DIAGNOSIS — I10 ESSENTIAL HYPERTENSION: ICD-10-CM

## 2024-08-01 RX ORDER — TRIAMTERENE AND HYDROCHLOROTHIAZIDE 37.5; 25 MG/1; MG/1
CAPSULE ORAL
Qty: 100 CAPSULE | Refills: 1 | Status: SHIPPED | OUTPATIENT
Start: 2024-08-01

## 2024-08-26 ENCOUNTER — APPOINTMENT (OUTPATIENT)
Dept: LAB | Facility: CLINIC | Age: 74
End: 2024-08-26
Payer: MEDICARE

## 2024-08-26 DIAGNOSIS — E03.8 OTHER SPECIFIED HYPOTHYROIDISM: ICD-10-CM

## 2024-08-26 LAB — TSH SERPL DL<=0.05 MIU/L-ACNC: 1.22 UIU/ML (ref 0.45–4.5)

## 2024-08-26 PROCEDURE — 84443 ASSAY THYROID STIM HORMONE: CPT

## 2024-08-26 PROCEDURE — 36415 COLL VENOUS BLD VENIPUNCTURE: CPT

## 2024-08-26 PROCEDURE — 84439 ASSAY OF FREE THYROXINE: CPT

## 2024-08-27 LAB — T4 FREE SERPL-MCNC: 1.27 NG/DL (ref 0.61–1.12)

## 2024-08-28 DIAGNOSIS — E03.9 ACQUIRED HYPOTHYROIDISM: Primary | ICD-10-CM

## 2024-10-14 ENCOUNTER — HOSPITAL ENCOUNTER (OUTPATIENT)
Dept: RADIOLOGY | Facility: HOSPITAL | Age: 74
Discharge: HOME | End: 2024-10-14
Payer: MEDICARE

## 2024-10-14 DIAGNOSIS — E04.1 THYROID NODULE: ICD-10-CM

## 2024-10-14 PROCEDURE — 76536 US EXAM OF HEAD AND NECK: CPT

## 2024-12-01 DIAGNOSIS — I10 ESSENTIAL HYPERTENSION: ICD-10-CM

## 2024-12-03 RX ORDER — FOSINOPRIL SODIUM 40 MG/1
40 TABLET ORAL DAILY
Qty: 90 TABLET | Refills: 0 | Status: SHIPPED | OUTPATIENT
Start: 2024-12-03

## 2024-12-13 ENCOUNTER — TELEPHONE (OUTPATIENT)
Dept: ENDOCRINOLOGY | Facility: CLINIC | Age: 74
End: 2024-12-13

## 2025-01-28 DIAGNOSIS — I10 ESSENTIAL HYPERTENSION: ICD-10-CM

## 2025-01-29 RX ORDER — TRIAMTERENE AND HYDROCHLOROTHIAZIDE 37.5; 25 MG/1; MG/1
CAPSULE ORAL
Qty: 90 CAPSULE | Refills: 1 | Status: SHIPPED | OUTPATIENT
Start: 2025-01-29

## 2025-01-30 ENCOUNTER — OFFICE VISIT (OUTPATIENT)
Dept: FAMILY MEDICINE CLINIC | Facility: CLINIC | Age: 75
End: 2025-01-30
Payer: MEDICARE

## 2025-01-30 VITALS
RESPIRATION RATE: 16 BRPM | SYSTOLIC BLOOD PRESSURE: 120 MMHG | BODY MASS INDEX: 22.39 KG/M2 | WEIGHT: 156.4 LBS | TEMPERATURE: 98.9 F | HEIGHT: 70 IN | DIASTOLIC BLOOD PRESSURE: 74 MMHG | HEART RATE: 88 BPM

## 2025-01-30 DIAGNOSIS — C07 CANCER OF PAROTID GLAND (HCC): ICD-10-CM

## 2025-01-30 DIAGNOSIS — I10 ESSENTIAL HYPERTENSION: ICD-10-CM

## 2025-01-30 DIAGNOSIS — Z23 ENCOUNTER FOR IMMUNIZATION: ICD-10-CM

## 2025-01-30 DIAGNOSIS — E03.9 ACQUIRED HYPOTHYROIDISM: ICD-10-CM

## 2025-01-30 DIAGNOSIS — Z00.00 MEDICARE ANNUAL WELLNESS VISIT, SUBSEQUENT: Primary | ICD-10-CM

## 2025-01-30 PROCEDURE — G0008 ADMIN INFLUENZA VIRUS VAC: HCPCS

## 2025-01-30 PROCEDURE — G0439 PPPS, SUBSEQ VISIT: HCPCS | Performed by: INTERNAL MEDICINE

## 2025-01-30 PROCEDURE — 90662 IIV NO PRSV INCREASED AG IM: CPT

## 2025-01-30 NOTE — PROGRESS NOTES
Name: Kelly Lind      : 1950      MRN: [id number]  Encounter Provider: Rubi Leo MD  Encounter Date: 2025   Encounter department: Located within [facility]    Assessment & Plan  Medicare annual wellness visit, subsequent         Acquired hypothyroidism  Managed by endocrinology.       Essential hypertension  Well controlled on dyazide and fosinopril and will continue as ordered.        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(HCC)  By history, no recurrent symptoms.        Encounter for immunization    Orders:  •  influenza vaccine, high-dose, PF 0.5 mL (Fluzone High Dose)       Preventive health issues were discussed with patient, and age appropriate screening tests were ordered as noted in patient's After Visit Summary. Personalized health advice and appropriate referrals for health education or preventive services given if needed, as noted in patient's After Visit Summary.    History of Present Illness     Here for AWV and follow up visit.  She is feeling well, and staying active.  Continues to follow with endocrinology for her thyroid.  Has had recent swallow study and US neck.         Patient Care Team:  Rubi Leo MD as PCP - General (Internal Medicine)  Lashanda Ring MD (Endocrinology)    Review of Systems   Constitutional:  Negative for chills and fever.   HENT:  Negative for ear pain and sore throat.    Eyes:  Negative for pain and visual disturbance.   Respiratory:  Negative for cough and shortness of breath.    Cardiovascular:  Negative for chest pain and palpitations.   Gastrointestinal:  Negative for abdominal pain and vomiting.   Endocrine: Negative for cold intolerance and heat intolerance.   Genitourinary:  Negative for dysuria and hematuria.   Musculoskeletal:  Negative for arthralgias and back pain.   Skin:  Negative for color change and rash.   Neurological:  Negative for seizures and syncope.   All other systems reviewed and are negative.    Medical History Reviewed by provider this  encounter:       Annual Wellness Visit Questionnaire   Kelly is here for her Subsequent Wellness visit.     Health Risk Assessment:   Patient rates overall health as very good. Patient feels that their physical health rating is same. Patient is very satisfied with their life. Eyesight was rated as slightly worse. Hearing was rated as same. Patient feels that their emotional and mental health rating is same. Patients states they are never, rarely angry. Patient states they are sometimes unusually tired/fatigued. Pain experienced in the last 7 days has been none. Patient states that she has experienced no weight loss or gain in last 6 months.     Depression Screening:   PHQ-2 Score: 0      Fall Risk Screening:   In the past year, patient has experienced: history of falling in past year    Number of falls: 1  Injured during fall?: No    Feels unsteady when standing or walking?: No    Worried about falling?: No      Urinary Incontinence Screening:   Patient has not leaked urine accidently in the last six months.     Home Safety:  Patient does not have trouble with stairs inside or outside of their home. Patient has working smoke alarms and has working carbon monoxide detector. Home safety hazards include: none.     Nutrition:   Current diet is Regular.     Medications:   Patient is currently taking over-the-counter supplements. OTC medications include: see medication list. Patient is able to manage medications.     Activities of Daily Living (ADLs)/Instrumental Activities of Daily Living (IADLs):   Walk and transfer into and out of bed and chair?: Yes  Dress and groom yourself?: Yes    Bathe or shower yourself?: Yes    Feed yourself? Yes  Do your laundry/housekeeping?: Yes  Manage your money, pay your bills and track your expenses?: Yes  Make your own meals?: Yes    Do your own shopping?: Yes    Previous Hospitalizations:   Any hospitalizations or ED visits within the last 12 months?: No      Advance Care Planning:    Living will: Yes    Durable POA for healthcare: Yes    Advanced directive: Yes    End of Life Decisions reviewed with patient: Yes      Cognitive Screening:   Provider or family/friend/caregiver concerned regarding cognition?: No    PREVENTIVE SCREENINGS      Cardiovascular Screening:    General: Screening Current      Diabetes Screening:     General: Risks and Benefits Discussed      Colorectal Cancer Screening:     General: Screening Current      Breast Cancer Screening:     General: Risks and Benefits Discussed    Due for: Mammogram        Cervical Cancer Screening:    General: Screening Not Indicated      Osteoporosis Screening:    General: Patient Declines      Abdominal Aortic Aneurysm (AAA) Screening:        General: Screening Not Indicated      Lung Cancer Screening:     General: Screening Not Indicated      Hepatitis C Screening:    General: Screening Current    Screening, Brief Intervention, and Referral to Treatment (SBIRT)    Screening  Typical number of drinks in a day: 0  Typical number of drinks in a week: 0  Interpretation: Low risk drinking behavior.    Single Item Drug Screening:  How often have you used an illegal drug (including marijuana) or a prescription medication for non-medical reasons in the past year? never    Single Item Drug Screen Score: 0  Interpretation: Negative screen for possible drug use disorder    Brief Intervention  Alcohol & drug use screenings were reviewed. No concerns regarding substance use disorder identified.     Other Counseling Topics:   Car/seat belt/driving safety, skin self-exam, sunscreen and calcium and vitamin D intake and regular weightbearing exercise.     Social Drivers of Health     Financial Resource Strain: Low Risk  (6/5/2023)    Overall Financial Resource Strain (CARDIA)    • Difficulty of Paying Living Expenses: Not very hard   Food Insecurity: No Food Insecurity (1/30/2025)    Hunger Vital Sign    • Worried About Running Out of Food in the Last Year:  "Never true    • Ran Out of Food in the Last Year: Never true   Transportation Needs: No Transportation Needs (1/30/2025)    PRAPARE - Transportation    • Lack of Transportation (Medical): No    • Lack of Transportation (Non-Medical): No   Housing Stability: Low Risk  (1/30/2025)    Housing Stability Vital Sign    • Unable to Pay for Housing in the Last Year: No    • Number of Times Moved in the Last Year: 0    • Homeless in the Last Year: No   Utilities: Not At Risk (1/30/2025)    [facility] Utilities    • Threatened with loss of utilities: No     No results found.    Objective   /74   Pulse 88   Temp 98.9 °F (37.2 °C)   Resp 16   Ht 5' 10\" (1.778 m)   Wt 70.9 kg (156 lb 6.4 oz)   BMI 22.44 kg/m²     Physical Exam  Constitutional:       General: She is not in acute distress.     Appearance: She is well-developed. She is not diaphoretic.   HENT:      Head: Normocephalic and atraumatic.      Right Ear: External ear normal.      Left Ear: External ear normal.      Nose: Nose normal.   Eyes:      Pupils: Pupils are equal, round, and reactive to light.   Neck:      Thyroid: No thyromegaly.      Vascular: No JVD.   Cardiovascular:      Rate and Rhythm: Regular rhythm.      Heart sounds: No murmur heard.     No friction rub. No gallop.   Pulmonary:      Effort: Pulmonary effort is normal.      Breath sounds: Normal breath sounds. No wheezing or rales.   Abdominal:      General: Bowel sounds are normal. There is no distension.      Palpations: Abdomen is soft.      Tenderness: There is no abdominal tenderness.   Musculoskeletal:         General: Normal range of motion.      Cervical back: Normal range of motion and neck supple.   Skin:     General: Skin is warm and dry.      Findings: No rash.   Neurological:      Mental Status: She is alert and oriented to person, place, and time.      Cranial Nerves: No cranial nerve deficit.      Sensory: No sensory deficit.      Motor: No abnormal muscle tone.      Coordination: " Coordination normal.      Deep Tendon Reflexes: Reflexes normal.   Psychiatric:         Behavior: Behavior normal.         Thought Content: Thought content normal.         Judgment: Judgment normal.

## 2025-01-30 NOTE — PATIENT INSTRUCTIONS
Medicare Preventive Visit Patient Instructions  Thank you for completing your Welcome to Medicare Visit or Medicare Annual Wellness Visit today. Your next wellness visit will be due in one year (1/31/2026).  The screening/preventive services that you may require over the next 5-10 years are detailed below. Some tests may not apply to you based off risk factors and/or age. Screening tests ordered at today's visit but not completed yet may show as past due. Also, please note that scanned in results may not display below.  Preventive Screenings:  Service Recommendations Previous Testing/Comments   Colorectal Cancer Screening  * Colonoscopy    * Fecal Occult Blood Test (FOBT)/Fecal Immunochemical Test (FIT)  * Fecal DNA/Cologuard Test  * Flexible Sigmoidoscopy Age: 45-75 years old   Colonoscopy: every 10 years (may be performed more frequently if at higher risk)  OR  FOBT/FIT: every 1 year  OR  Cologuard: every 3 years  OR  Sigmoidoscopy: every 5 years  Screening may be recommended earlier than age 45 if at higher risk for colorectal cancer. Also, an individualized decision between you and your healthcare provider will decide whether screening between the ages of 76-85 would be appropriate. Colonoscopy: Not on file  FOBT/FIT: Not on file  Cologuard: 07/22/2023  Sigmoidoscopy: Not on file    Screening Current     Breast Cancer Screening Age: 40+ years old  Frequency: every 1-2 years  Not required if history of left and right mastectomy Mammogram: 04/24/2023    Screening Current   Cervical Cancer Screening Between the ages of 21-29, pap smear recommended once every 3 years.   Between the ages of 30-65, can perform pap smear with HPV co-testing every 5 years.   Recommendations may differ for women with a history of total hysterectomy, cervical cancer, or abnormal pap smears in past. Pap Smear: Not on file    Screening Not Indicated   Hepatitis C Screening Once for adults born between 1945 and 1965  More frequently in  patients at high risk for Hepatitis C Hep C Antibody: 12/27/2023    Screening Current   Diabetes Screening 1-2 times per year if you're at risk for diabetes or have pre-diabetes Fasting glucose: 90 mg/dL (12/27/2023)  A1C: No results in last 5 years (No results in last 5 years)      Cholesterol Screening Once every 5 years if you don't have a lipid disorder. May order more often based on risk factors. Lipid panel: 12/27/2023    Screening Current     Other Preventive Screenings Covered by Medicare:  Abdominal Aortic Aneurysm (AAA) Screening: covered once if your at risk. You're considered to be at risk if you have a family history of AAA.  Lung Cancer Screening: covers low dose CT scan once per year if you meet all of the following conditions: (1) Age 55-77; (2) No signs or symptoms of lung cancer; (3) Current smoker or have quit smoking within the last 15 years; (4) You have a tobacco smoking history of at least 20 pack years (packs per day multiplied by number of years you smoked); (5) You get a written order from a healthcare provider.  Glaucoma Screening: covered annually if you're considered high risk: (1) You have diabetes OR (2) Family history of glaucoma OR (3)  aged 50 and older OR (4)  American aged 65 and older  Osteoporosis Screening: covered every 2 years if you meet one of the following conditions: (1) You're estrogen deficient and at risk for osteoporosis based off medical history and other findings; (2) Have a vertebral abnormality; (3) On glucocorticoid therapy for more than 3 months; (4) Have primary hyperparathyroidism; (5) On osteoporosis medications and need to assess response to drug therapy.   Last bone density test (DXA Scan): 03/02/2022.  HIV Screening: covered annually if you're between the age of 15-65. Also covered annually if you are younger than 15 and older than 65 with risk factors for HIV infection. For pregnant patients, it is covered up to 3 times per  pregnancy.    Immunizations:  Immunization Recommendations   Influenza Vaccine Annual influenza vaccination during flu season is recommended for all persons aged >= 6 months who do not have contraindications   Pneumococcal Vaccine   * Pneumococcal conjugate vaccine = PCV13 (Prevnar 13), PCV15 (Vaxneuvance), PCV20 (Prevnar 20)  * Pneumococcal polysaccharide vaccine = PPSV23 (Pneumovax) Adults 19-65 yo with certain risk factors or if 65+ yo  If never received any pneumonia vaccine: recommend Prevnar 20 (PCV20)  Give PCV20 if previously received 1 dose of PCV13 or PPSV23   Hepatitis B Vaccine 3 dose series if at intermediate or high risk (ex: diabetes, end stage renal disease, liver disease)   Respiratory syncytial virus (RSV) Vaccine - COVERED BY MEDICARE PART D  * RSVPreF3 (Arexvy) CDC recommends that adults 60 years of age and older may receive a single dose of RSV vaccine using shared clinical decision-making (SCDM)   Tetanus (Td) Vaccine - COST NOT COVERED BY MEDICARE PART B Following completion of primary series, a booster dose should be given every 10 years to maintain immunity against tetanus. Td may also be given as tetanus wound prophylaxis.   Tdap Vaccine - COST NOT COVERED BY MEDICARE PART B Recommended at least once for all adults. For pregnant patients, recommended with each pregnancy.   Shingles Vaccine (Shingrix) - COST NOT COVERED BY MEDICARE PART B  2 shot series recommended in those 19 years and older who have or will have weakened immune systems or those 50 years and older     Health Maintenance Due:      Topic Date Due   • Breast Cancer Screening: Mammogram  04/24/2024   • Colorectal Cancer Screening  07/22/2026   • Hepatitis C Screening  Completed     Immunizations Due:      Topic Date Due   • Pneumococcal Vaccine: 65+ Years (1 of 1 - PCV) Never done   • Influenza Vaccine (1) 09/01/2024   • COVID-19 Vaccine (3 - 2024-25 season) 09/01/2024     Advance Directives   What are advance directives?   Advance directives are legal documents that state your wishes and plans for medical care. These plans are made ahead of time in case you lose your ability to make decisions for yourself. Advance directives can apply to any medical decision, such as the treatments you want, and if you want to donate organs.   What are the types of advance directives?  There are many types of advance directives, and each state has rules about how to use them. You may choose a combination of any of the following:  Living will:  This is a written record of the treatment you want. You can also choose which treatments you do not want, which to limit, and which to stop at a certain time. This includes surgery, medicine, IV fluid, and tube feedings.   Durable power of  for healthcare (DPAHC):  This is a written record that states who you want to make healthcare choices for you when you are unable to make them for yourself. This person, called a proxy, is usually a family member or a friend. You may choose more than 1 proxy.  Do not resuscitate (DNR) order:  A DNR order is used in case your heart stops beating or you stop breathing. It is a request not to have certain forms of treatment, such as CPR. A DNR order may be included in other types of advance directives.  Medical directive:  This covers the care that you want if you are in a coma, near death, or unable to make decisions for yourself. You can list the treatments you want for each condition. Treatment may include pain medicine, surgery, blood transfusions, dialysis, IV or tube feedings, and a ventilator (breathing machine).  Values history:  This document has questions about your views, beliefs, and how you feel and think about life. This information can help others choose the care that you would choose.  Why are advance directives important?  An advance directive helps you control your care. Although spoken wishes may be used, it is better to have your wishes written down.  Spoken wishes can be misunderstood, or not followed. Treatments may be given even if you do not want them. An advance directive may make it easier for your family to make difficult choices about your care.   Fall Prevention    Fall prevention  includes ways to make your home and other areas safer. It also includes ways you can move more carefully to prevent a fall. Health conditions that cause changes in your blood pressure, vision, or muscle strength and coordination may increase your risk for falls. Medicines may also increase your risk for falls if they make you dizzy, weak, or sleepy.   Fall prevention tips:   Stand or sit up slowly.    Use assistive devices as directed.    Wear shoes that fit well and have soles that .    Wear a personal alarm.    Stay active.    Manage your medical conditions.    Home Safety Tips:  Add items to prevent falls in the bathroom.    Keep paths clear.    Install bright lights in your home.    Keep items you use often on shelves within reach.    Paint or place reflective tape on the edges of your stairs.       © Copyright [employer] 2018 Information is for End User's use only and may not be sold, redistributed or otherwise used for commercial purposes. All illustrations and images included in CareNotes® are the copyrighted property of [employer] or [employer]

## 2025-02-28 DIAGNOSIS — I10 ESSENTIAL HYPERTENSION: ICD-10-CM

## 2025-03-01 DIAGNOSIS — E03.8 OTHER SPECIFIED HYPOTHYROIDISM: ICD-10-CM

## 2025-03-03 RX ORDER — FOSINOPRIL SODIUM 40 MG/1
40 TABLET ORAL DAILY
Qty: 90 TABLET | Refills: 1 | Status: SHIPPED | OUTPATIENT
Start: 2025-03-03

## 2025-03-03 RX ORDER — LEVOTHYROXINE SODIUM 100 UG/1
100 TABLET ORAL DAILY
Qty: 90 TABLET | Refills: 0 | Status: SHIPPED | OUTPATIENT
Start: 2025-03-03

## 2025-04-23 ENCOUNTER — OFFICE VISIT (OUTPATIENT)
Dept: ENDOCRINOLOGY | Facility: CLINIC | Age: 75
End: 2025-04-23
Payer: MEDICARE

## 2025-04-23 VITALS
DIASTOLIC BLOOD PRESSURE: 72 MMHG | BODY MASS INDEX: 21.88 KG/M2 | OXYGEN SATURATION: 92 % | HEART RATE: 79 BPM | WEIGHT: 152.8 LBS | SYSTOLIC BLOOD PRESSURE: 118 MMHG | HEIGHT: 70 IN

## 2025-04-23 DIAGNOSIS — E04.1 THYROID NODULE: ICD-10-CM

## 2025-04-23 DIAGNOSIS — E03.9 ACQUIRED HYPOTHYROIDISM: Primary | ICD-10-CM

## 2025-04-23 PROCEDURE — 99214 OFFICE O/P EST MOD 30 MIN: CPT | Performed by: NURSE PRACTITIONER

## 2025-04-23 RX ORDER — LEVOTHYROXINE SODIUM 100 UG/1
100 TABLET ORAL DAILY
Qty: 90 TABLET | Refills: 0 | Status: SHIPPED | OUTPATIENT
Start: 2025-04-23

## 2025-04-23 NOTE — PROGRESS NOTES
Name: Kelly Lind      : 1950      MRN: [id number]  Encounter Provider: VENU Panchal  Encounter Date: 2025   Encounter department: [facility] [place]    Chief Complaint   Patient presents with    Follow-up   :  Assessment & Plan  Acquired hypothyroidism  Clinically euthyroid on levothyroxine 100 mcg daily. Patient last had thyroid function tests checked in 2024. Recommend rechecking levels.   Orders:    TSH, 3rd generation; Future    T4, free; Future    levothyroxine 100 mcg tablet; Take 1 tablet (100 mcg total) by mouth daily    Thyroid nodule  Last thyroid ultrasound from 2024 demonstrated stable thyroid nodule with no recommendation for further thyroid ultrasounds.   Symptoms unchanged has history of dysphagia with aspiration. Had swallow evaluation completed 2023. Reviewed recommendations with patient that SLP or ENT eval & treat could be reconsidered at any time.              History of Present Illness     Kelly Lind is a 75 y.o. female  with a history of hypothyroidism diagnosed approximately 20 years ago. She has a history of parotid cancer diagnosed in , s/p surgery including lymph node dissection and s/p chemoradiation with extensive scarring to the left neck.      Currently on levothyroxine 100 mcg daily, taken regularly or properly. Weight changes likely secondary to difficulty finding food options given dysphagia with history of aspiration. Does not follow up with SLP or ENT.  Denies anxiety, jitteriness, or tremors.  Denies tachycardia or palpitations.  Denies constipation or hyperdefecation.  Denies frequent headaches.  Denies temperature intolerances.     Review of Systems as per HPI  Medical History Reviewed by provider this encounter:  Tobacco  Allergies  Meds  Problems  Med Hx  Surg Hx  Fam Hx     .  Current Outpatient Medications on File Prior to Visit   Medication Sig Dispense Refill    aspirin 81  "mg chewable tablet Chew 81 mg daily      fosinopril (MONOPRIL) 40 mg tablet TAKE 1 TABLET BY MOUTH EVERY DAY 90 tablet 1    levothyroxine 100 mcg tablet TAKE 1 TABLET BY MOUTH EVERY DAY 90 tablet 0    PARoxetine (PAXIL) 20 mg tablet Take 20 mg by mouth daily      triamterene-hydrochlorothiazide (DYAZIDE) 37.5-25 mg per capsule TAKE 1 CAPSULE BY MOUTH EVERY DAY IN THE MORNING 90 capsule 1     No current facility-administered medications on file prior to visit.         Medical History Reviewed by provider this encounter:     .    Objective   /72 (BP Location: Right arm, Patient Position: Sitting, Cuff Size: Large)   Pulse 79   Ht 5' 10\" (1.778 m)   Wt 69.3 kg (152 lb 12.8 oz)   SpO2 92%   BMI 21.92 kg/m²      Body mass index is 21.92 kg/m².  Wt Readings from Last 3 Encounters:   04/23/25 69.3 kg (152 lb 12.8 oz)   01/30/25 70.9 kg (156 lb 6.4 oz)   07/23/24 73.9 kg (163 lb)       Physical Exam   Constitutional: She is oriented to person, place, and time. She appears well-developed and well-nourished. No distress.   HENT:   Head: Normocephalic and atraumatic.   Neck: Normal range of motion.   Pulmonary/Chest: Effort normal.   Musculoskeletal: Normal range of motion.   Neurological: She is alert and oriented to person, place, and time.   Skin: She is not diaphoretic.   Psychiatric: She has a normal mood and affect. Her behavior is normal.       Labs:   No results found for: \"HGBA1C\"  Lab Results   Component Value Date    CREATININE 0.77 12/27/2023    CREATININE 0.90 06/16/2023    BUN 12 12/27/2023    K 4.1 12/27/2023    CL 97 12/27/2023    CO2 36 (H) 12/27/2023     eGFR   Date Value Ref Range Status   12/27/2023 77 ml/min/1.73sq m Final     Lab Results   Component Value Date    HDL 78 12/27/2023    TRIG 64 12/27/2023     Lab Results   Component Value Date    ALT 11 12/27/2023    AST 16 12/27/2023    ALKPHOS 70 12/27/2023     Lab Results   Component Value Date    SUZ8VQBYCRAR 1.220 08/26/2024    ION2ICSGUHVB " 0.218 (L) 07/12/2024    AWX7NYUQCQQM 1.073 12/27/2023     Lab Results   Component Value Date    FREET4 1.27 (H) 08/26/2024       There are no Patient Instructions on file for this visit.    Discussed with the patient and all questioned fully answered. She will call me if any problems arise.

## 2025-04-23 NOTE — ASSESSMENT & PLAN NOTE
Clinically euthyroid on levothyroxine 100 mcg daily. Patient last had thyroid function tests checked in August 2024. Recommend rechecking levels.   Orders:    TSH, 3rd generation; Future    T4, free; Future    levothyroxine 100 mcg tablet; Take 1 tablet (100 mcg total) by mouth daily

## 2025-04-23 NOTE — ASSESSMENT & PLAN NOTE
Last thyroid ultrasound from October 2024 demonstrated stable thyroid nodule with no recommendation for further thyroid ultrasounds.   Symptoms unchanged has history of dysphagia with aspiration. Had swallow evaluation completed November 2023. Reviewed recommendations with patient that SLP or ENT eval & treat could be reconsidered at any time.

## 2025-04-28 ENCOUNTER — APPOINTMENT (OUTPATIENT)
Dept: LAB | Facility: CLINIC | Age: 75
End: 2025-04-28
Attending: NURSE PRACTITIONER
Payer: MEDICARE

## 2025-04-28 DIAGNOSIS — E03.9 ACQUIRED HYPOTHYROIDISM: ICD-10-CM

## 2025-04-28 PROCEDURE — 84439 ASSAY OF FREE THYROXINE: CPT

## 2025-04-28 PROCEDURE — 84443 ASSAY THYROID STIM HORMONE: CPT

## 2025-04-28 PROCEDURE — 36415 COLL VENOUS BLD VENIPUNCTURE: CPT

## 2025-04-29 ENCOUNTER — RESULTS FOLLOW-UP (OUTPATIENT)
Dept: ENDOCRINOLOGY | Facility: CLINIC | Age: 75
End: 2025-04-29

## 2025-04-29 LAB
T4 FREE SERPL-MCNC: 1.29 NG/DL (ref 0.61–1.12)
TSH SERPL DL<=0.05 MIU/L-ACNC: 2.93 UIU/ML (ref 0.45–4.5)

## 2025-05-31 DIAGNOSIS — I10 ESSENTIAL HYPERTENSION: ICD-10-CM

## 2025-06-01 ENCOUNTER — APPOINTMENT (EMERGENCY)
Dept: RADIOLOGY | Facility: HOSPITAL | Age: 75
DRG: 391 | End: 2025-06-01
Payer: MEDICARE

## 2025-06-01 ENCOUNTER — HOSPITAL ENCOUNTER (INPATIENT)
Facility: HOSPITAL | Age: 75
LOS: 4 days | Discharge: HOME | DRG: 391 | End: 2025-06-05
Admitting: INTERNAL MEDICINE
Payer: MEDICARE

## 2025-06-01 DIAGNOSIS — C07 CANCER OF PAROTID GLAND (HCC): ICD-10-CM

## 2025-06-01 DIAGNOSIS — R13.10 DYSPHAGIA: Primary | ICD-10-CM

## 2025-06-01 PROBLEM — E87.1 HYPONATREMIA: Status: ACTIVE | Noted: 2025-06-01

## 2025-06-01 LAB
ALBUMIN SERPL BCG-MCNC: 4.3 G/DL (ref 3.5–5)
ALP SERPL-CCNC: 82 U/L (ref 34–104)
ALT SERPL W P-5'-P-CCNC: 11 U/L (ref 7–52)
ANION GAP SERPL CALCULATED.3IONS-SCNC: 9 MMOL/L (ref 4–13)
AST SERPL W P-5'-P-CCNC: 17 U/L (ref 13–39)
BASOPHILS # BLD AUTO: 0.06 THOUSANDS/ÂΜL (ref 0–0.1)
BASOPHILS NFR BLD AUTO: 1 % (ref 0–1)
BILIRUB SERPL-MCNC: 0.67 MG/DL (ref 0.2–1)
BUN SERPL-MCNC: 9 MG/DL (ref 5–25)
CALCIUM SERPL-MCNC: 9.4 MG/DL (ref 8.4–10.2)
CHLORIDE SERPL-SCNC: 92 MMOL/L (ref 96–108)
CO2 SERPL-SCNC: 30 MMOL/L (ref 21–32)
CREAT SERPL-MCNC: 0.68 MG/DL (ref 0.6–1.3)
EOSINOPHIL # BLD AUTO: 0.03 THOUSAND/ÂΜL (ref 0–0.61)
EOSINOPHIL NFR BLD AUTO: 0 % (ref 0–6)
ERYTHROCYTE [DISTWIDTH] IN BLOOD BY AUTOMATED COUNT: 13.1 % (ref 11.6–15.1)
GFR SERPL CREATININE-BSD FRML MDRD: 86 ML/MIN/1.73SQ M
GLUCOSE SERPL-MCNC: 107 MG/DL (ref 65–140)
HCT VFR BLD AUTO: 42.1 % (ref 34.8–46.1)
HGB BLD-MCNC: 13.9 G/DL (ref 11.5–15.4)
IMM GRANULOCYTES # BLD AUTO: 0.02 THOUSAND/UL (ref 0–0.2)
IMM GRANULOCYTES NFR BLD AUTO: 0 % (ref 0–2)
LYMPHOCYTES # BLD AUTO: 0.6 THOUSANDS/ÂΜL (ref 0.6–4.47)
LYMPHOCYTES NFR BLD AUTO: 9 % (ref 14–44)
MCH RBC QN AUTO: 29.6 PG (ref 26.8–34.3)
MCHC RBC AUTO-ENTMCNC: 33 G/DL (ref 31.4–37.4)
MCV RBC AUTO: 90 FL (ref 82–98)
MONOCYTES # BLD AUTO: 0.46 THOUSAND/ÂΜL (ref 0.17–1.22)
MONOCYTES NFR BLD AUTO: 7 % (ref 4–12)
NEUTROPHILS # BLD AUTO: 5.55 THOUSANDS/ÂΜL (ref 1.85–7.62)
NEUTS SEG NFR BLD AUTO: 83 % (ref 43–75)
NRBC BLD AUTO-RTO: 0 /100 WBCS
PLATELET # BLD AUTO: 214 THOUSANDS/UL (ref 149–390)
PMV BLD AUTO: 9.3 FL (ref 8.9–12.7)
POTASSIUM SERPL-SCNC: 3.6 MMOL/L (ref 3.5–5.3)
PROT SERPL-MCNC: 7.6 G/DL (ref 6.4–8.4)
RBC # BLD AUTO: 4.69 MILLION/UL (ref 3.81–5.12)
SODIUM SERPL-SCNC: 131 MMOL/L (ref 135–147)
WBC # BLD AUTO: 6.72 THOUSAND/UL (ref 4.31–10.16)

## 2025-06-01 PROCEDURE — 71260 CT THORAX DX C+: CPT

## 2025-06-01 PROCEDURE — 99284 EMERGENCY DEPT VISIT MOD MDM: CPT

## 2025-06-01 PROCEDURE — 70491 CT SOFT TISSUE NECK W/DYE: CPT

## 2025-06-01 PROCEDURE — 36415 COLL VENOUS BLD VENIPUNCTURE: CPT

## 2025-06-01 PROCEDURE — 31575 DIAGNOSTIC LARYNGOSCOPY: CPT | Performed by: OTOLARYNGOLOGY

## 2025-06-01 PROCEDURE — 96360 HYDRATION IV INFUSION INIT: CPT

## 2025-06-01 PROCEDURE — 85025 COMPLETE CBC W/AUTO DIFF WBC: CPT

## 2025-06-01 PROCEDURE — 0CJS8ZZ INSPECTION OF LARYNX, VIA NATURAL OR ARTIFICIAL OPENING ENDOSCOPIC: ICD-10-PCS | Performed by: OTOLARYNGOLOGY

## 2025-06-01 PROCEDURE — 99285 EMERGENCY DEPT VISIT HI MDM: CPT

## 2025-06-01 PROCEDURE — 99221 1ST HOSP IP/OBS SF/LOW 40: CPT | Performed by: OTOLARYNGOLOGY

## 2025-06-01 PROCEDURE — 99222 1ST HOSP IP/OBS MODERATE 55: CPT | Performed by: INTERNAL MEDICINE

## 2025-06-01 PROCEDURE — 80053 COMPREHEN METABOLIC PANEL: CPT

## 2025-06-01 RX ORDER — HYDRALAZINE HYDROCHLORIDE 20 MG/ML
5 INJECTION INTRAMUSCULAR; INTRAVENOUS EVERY 6 HOURS PRN
Status: DISCONTINUED | OUTPATIENT
Start: 2025-06-01 | End: 2025-06-05 | Stop reason: HOSPADM

## 2025-06-01 RX ORDER — LEVOTHYROXINE SODIUM 100 UG/1
100 TABLET ORAL DAILY
Status: DISCONTINUED | OUTPATIENT
Start: 2025-06-02 | End: 2025-06-04

## 2025-06-01 RX ORDER — ACETAMINOPHEN 10 MG/ML
1000 INJECTION, SOLUTION INTRAVENOUS EVERY 6 HOURS PRN
Status: ACTIVE | OUTPATIENT
Start: 2025-06-01 | End: 2025-06-03

## 2025-06-01 RX ORDER — LIDOCAINE HYDROCHLORIDE 40 MG/ML
5 SOLUTION TOPICAL ONCE
Status: COMPLETED | OUTPATIENT
Start: 2025-06-01 | End: 2025-06-01

## 2025-06-01 RX ORDER — SODIUM CHLORIDE, SODIUM GLUCONATE, SODIUM ACETATE, POTASSIUM CHLORIDE, MAGNESIUM CHLORIDE, SODIUM PHOSPHATE, DIBASIC, AND POTASSIUM PHOSPHATE .53; .5; .37; .037; .03; .012; .00082 G/100ML; G/100ML; G/100ML; G/100ML; G/100ML; G/100ML; G/100ML
75 INJECTION, SOLUTION INTRAVENOUS CONTINUOUS
Status: DISCONTINUED | OUTPATIENT
Start: 2025-06-01 | End: 2025-06-03

## 2025-06-01 RX ORDER — LISINOPRIL 20 MG/1
40 TABLET ORAL DAILY
Status: DISCONTINUED | OUTPATIENT
Start: 2025-06-01 | End: 2025-06-04

## 2025-06-01 RX ORDER — ONDANSETRON 2 MG/ML
4 INJECTION INTRAMUSCULAR; INTRAVENOUS EVERY 6 HOURS PRN
Status: DISCONTINUED | OUTPATIENT
Start: 2025-06-01 | End: 2025-06-05 | Stop reason: HOSPADM

## 2025-06-01 RX ORDER — ASPIRIN 81 MG/1
81 TABLET, CHEWABLE ORAL DAILY
Status: DISCONTINUED | OUTPATIENT
Start: 2025-06-02 | End: 2025-06-05 | Stop reason: HOSPADM

## 2025-06-01 RX ORDER — OXYMETAZOLINE HYDROCHLORIDE 0.05 G/100ML
2 SPRAY NASAL ONCE
Status: COMPLETED | OUTPATIENT
Start: 2025-06-01 | End: 2025-06-01

## 2025-06-01 RX ORDER — PANTOPRAZOLE SODIUM 40 MG/10ML
40 INJECTION, POWDER, LYOPHILIZED, FOR SOLUTION INTRAVENOUS EVERY 12 HOURS SCHEDULED
Status: DISCONTINUED | OUTPATIENT
Start: 2025-06-01 | End: 2025-06-05 | Stop reason: HOSPADM

## 2025-06-01 RX ORDER — LEVOTHYROXINE SODIUM ANHYDROUS 100 UG/5ML
70 INJECTION, POWDER, LYOPHILIZED, FOR SOLUTION INTRAVENOUS ONCE
Status: COMPLETED | OUTPATIENT
Start: 2025-06-02 | End: 2025-06-02

## 2025-06-01 RX ORDER — FOSINOPRIL SODIUM 40 MG/1
40 TABLET ORAL DAILY
Qty: 90 TABLET | Refills: 1 | Status: SHIPPED | OUTPATIENT
Start: 2025-06-01

## 2025-06-01 RX ORDER — PAROXETINE 20 MG/1
20 TABLET, FILM COATED ORAL DAILY
Status: DISCONTINUED | OUTPATIENT
Start: 2025-06-02 | End: 2025-06-04

## 2025-06-01 RX ORDER — ENOXAPARIN SODIUM 100 MG/ML
40 INJECTION SUBCUTANEOUS DAILY
Status: DISCONTINUED | OUTPATIENT
Start: 2025-06-01 | End: 2025-06-05 | Stop reason: HOSPADM

## 2025-06-01 RX ORDER — TRIAMTERENE AND HYDROCHLOROTHIAZIDE 37.5; 25 MG/1; MG/1
1 TABLET ORAL DAILY
Status: DISCONTINUED | OUTPATIENT
Start: 2025-06-02 | End: 2025-06-04

## 2025-06-01 RX ORDER — METHYLPREDNISOLONE SODIUM SUCCINATE 40 MG/ML
40 INJECTION, POWDER, LYOPHILIZED, FOR SOLUTION INTRAMUSCULAR; INTRAVENOUS DAILY
Status: DISCONTINUED | OUTPATIENT
Start: 2025-06-01 | End: 2025-06-05 | Stop reason: HOSPADM

## 2025-06-01 RX ADMIN — SODIUM CHLORIDE 1000 ML: 0.9 INJECTION, SOLUTION INTRAVENOUS at 11:24

## 2025-06-01 RX ADMIN — METHYLPREDNISOLONE SODIUM SUCCINATE 40 MG: 40 INJECTION, POWDER, FOR SOLUTION INTRAMUSCULAR; INTRAVENOUS at 16:06

## 2025-06-01 RX ADMIN — SODIUM CHLORIDE, SODIUM GLUCONATE, SODIUM ACETATE, POTASSIUM CHLORIDE, MAGNESIUM CHLORIDE, SODIUM PHOSPHATE, DIBASIC, AND POTASSIUM PHOSPHATE 75 ML/HR: .53; .5; .37; .037; .03; .012; .00082 INJECTION, SOLUTION INTRAVENOUS at 14:41

## 2025-06-01 RX ADMIN — OXYMETAZOLINE HYDROCHLORIDE 2 SPRAY: 0.5 SPRAY NASAL at 15:19

## 2025-06-01 RX ADMIN — LIDOCAINE HYDROCHLORIDE 5 ML: 40 SOLUTION ORAL at 15:19

## 2025-06-01 RX ADMIN — IOHEXOL 80 ML: 350 INJECTION, SOLUTION INTRAVENOUS at 12:13

## 2025-06-01 RX ADMIN — PANTOPRAZOLE SODIUM 40 MG: 40 INJECTION, POWDER, FOR SOLUTION INTRAVENOUS at 20:15

## 2025-06-01 RX ADMIN — ENOXAPARIN SODIUM 40 MG: 40 INJECTION SUBCUTANEOUS at 14:41

## 2025-06-01 NOTE — ED PROVIDER NOTES
Time reflects when diagnosis was documented in both MDM as applicable and the Disposition within this note       Time User Action Codes Description Comment    6/1/2025 11:46 AM Jeovanny Serna Add [R13.10] Dysphagia     6/1/2025  1:53 PM Mary Salcido [C07] 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(HCC)           ED Disposition       ED Disposition   Admit    Condition   Stable    Date/Time   Sun Jun 1, 2025 11:46 AM    Comment   Case was discussed with GENE and the patient's admission status was agreed to be Admission Status: inpatient status to the service of Dr. Hunter.               Assessment & Plan       Medical Decision Making  Amount and/or Complexity of Data Reviewed  Labs: ordered.  Radiology: ordered.    Risk  Prescription drug management.  Decision regarding hospitalization.      76 y/o F presenting for evaluation of dyshpagia. Pt has hx of dysphagia related to prior radiation to neck, last evaluated by swallow study in 2023. Pt reports last night and this AM complete intolerance to both solids and liquids. States she would swallow and they would come back up.  VSS  Benign exam, pt with baseline raspy voice  D/w GI, recommend CT for now and admit for swallow study +/- EGD. CT chest and neck obtained. Stable for admission to medical service.            Medications   sodium chloride 0.9 % bolus 1,000 mL (0 mL Intravenous Stopped 6/1/25 1224)   iohexol (OMNIPAQUE) 350 MG/ML injection (MULTI-DOSE) 80 mL (80 mL Intravenous Given 6/1/25 1213)       ED Risk Strat Scores                    (ISAR) Identification of Seniors at Risk  Before the illness or injury that brought you to the Emergency, did you need someone to help you on a regular basis?: 0  In the last 24 hours, have you needed more help than usual?: 0  Have you been hospitalized for one or more nights during the past 6 months?: 0  In general, do you see well?: 0  In general, do you have serious problems with your memory?: 0  Do you take more than three  "different medications every day?: 1  ISAR Score: 1            SBIRT 20yo+      Flowsheet Row Most Recent Value   Initial Alcohol Screen: US AUDIT-C     1. How often do you have a drink containing alcohol? 0 Filed at: 06/01/2025 1059   3b. FEMALE Any Age, or MALE 65+: How often do you have 4 or more drinks on one occassion? 0 Filed at: 06/01/2025 1059   Audit-C Score 0 Filed at: 06/01/2025 1059   TIGIST: How many times in the past year have you...    Used an illegal drug or used a prescription medication for non-medical reasons? Never Filed at: 06/01/2025 1059                            History of Present Illness       Chief Complaint   Patient presents with    Difficulty Swallowing     Wet voice. Hx of parotid gland cancer and dysphagia. States having a lot of phlegm for 3 days \" and nothing goes down , it comes back up \". Sat 92 %, currently swallowing saliva.        Past Medical History[1]   Past Surgical History[2]   Family History[3]   Social History[4]   E-Cigarette/Vaping    E-Cigarette Use Never User       E-Cigarette/Vaping Substances    Nicotine No     THC No     CBD No     Flavoring No     Other No     Unknown No       I have reviewed and agree with the history as documented.     HPI  See mdm  Review of Systems   Constitutional:  Negative for chills and fever.   HENT:  Positive for trouble swallowing. Negative for ear pain and sore throat.    Eyes:  Negative for pain and visual disturbance.   Respiratory:  Negative for cough and shortness of breath.    Cardiovascular:  Negative for chest pain and palpitations.   Gastrointestinal:  Negative for abdominal pain and vomiting.   Genitourinary:  Negative for dysuria and hematuria.   Musculoskeletal:  Negative for arthralgias and back pain.   Skin:  Negative for color change and rash.   Neurological:  Negative for seizures and syncope.   All other systems reviewed and are negative.          Objective       ED Triage Vitals [06/01/25 1057]   Temperature Pulse Blood " Pressure Respirations SpO2 Patient Position - Orthostatic VS   98 °F (36.7 °C) (!) 111 148/93 20 92 % Sitting      Temp Source Heart Rate Source BP Location FiO2 (%) Pain Score    Tympanic Monitor Right arm -- No Pain      Vitals      Date and Time Temp Pulse SpO2 Resp BP Pain Score FACES Pain Rating User   06/01/25 1819 99.1 °F (37.3 °C) 81 90 % 21 127/77 -- -- DII   06/01/25 1424 -- -- -- -- -- No Pain -- [facility]   06/01/25 1423 98 °F (36.7 °C) 81 95 % 17 149/87 -- -- JEC   06/01/25 1422 98 °F (36.7 °C) 81 95 % -- 149/87 -- -- DII   06/01/25 1421 98 °F (36.7 °C) -- -- 17 149/87 -- -- DII   06/01/25 1219 -- 67 98 % 20 162/77 -- -- JH   06/01/25 1057 98 °F (36.7 °C) 111 92 % 20 148/93 No Pain -- SW            Physical Exam  Vitals and nursing note reviewed.   Constitutional:       General: She is not in acute distress.  HENT:      Head: Normocephalic and atraumatic.      Right Ear: External ear normal.      Left Ear: External ear normal.      Nose: Nose normal.      Mouth/Throat:      Pharynx: Oropharynx is clear.     Eyes:      Extraocular Movements: Extraocular movements intact.      Pupils: Pupils are equal, round, and reactive to light.       Cardiovascular:      Rate and Rhythm: Normal rate and regular rhythm.      Pulses: Normal pulses.      Heart sounds: Normal heart sounds. No murmur heard.     No friction rub. No gallop.   Pulmonary:      Effort: Pulmonary effort is normal. No respiratory distress.      Breath sounds: Normal breath sounds. No wheezing, rhonchi or rales.   Abdominal:      General: Abdomen is flat. There is no distension.      Palpations: Abdomen is soft.      Tenderness: There is no abdominal tenderness. There is no guarding or rebound.     Musculoskeletal:         General: No deformity. Normal range of motion.      Cervical back: Normal range of motion.      Right lower leg: No edema.      Left lower leg: No edema.     Skin:     General: Skin is warm and dry.      Capillary Refill: Capillary  refill takes less than 2 seconds.      Findings: No rash.     Neurological:      General: No focal deficit present.      Mental Status: She is alert and oriented to person, place, and time.      Gait: Gait normal.     Psychiatric:         Mood and Affect: Mood normal.         Results Reviewed       Procedure Component Value Units Date/Time    Comprehensive metabolic panel [[id number]]  (Abnormal) Collected: 06/01/25 1122    Lab Status: Final result Specimen: Blood from Arm, Right Updated: 06/01/25 1145     Sodium 131 mmol/L      Potassium 3.6 mmol/L      Chloride 92 mmol/L      CO2 30 mmol/L      ANION GAP 9 mmol/L      BUN 9 mg/dL      Creatinine 0.68 mg/dL      Glucose 107 mg/dL      Calcium 9.4 mg/dL      AST 17 U/L      ALT 11 U/L      Alkaline Phosphatase 82 U/L      Total Protein 7.6 g/dL      Albumin 4.3 g/dL      Total Bilirubin 0.67 mg/dL      eGFR 86 ml/min/1.73sq m     Narrative:      National Kidney Disease Foundation guidelines for Chronic Kidney Disease (CKD):     Stage 1 with normal or high GFR (GFR > 90 mL/min/1.73 square meters)    Stage 2 Mild CKD (GFR = 60-89 mL/min/1.73 square meters)    Stage 3A Moderate CKD (GFR = 45-59 mL/min/1.73 square meters)    Stage 3B Moderate CKD (GFR = 30-44 mL/min/1.73 square meters)    Stage 4 Severe CKD (GFR = 15-29 mL/min/1.73 square meters)    Stage 5 End Stage CKD (GFR <15 mL/min/1.73 square meters)  Note: GFR calculation is accurate only with a steady state creatinine    CBC and differential [[id number]]  (Abnormal) Collected: 06/01/25 1122    Lab Status: Final result Specimen: Blood from Arm, Right Updated: 06/01/25 1129     WBC 6.72 Thousand/uL      RBC 4.69 Million/uL      Hemoglobin 13.9 g/dL      Hematocrit 42.1 %      MCV 90 fL      MCH 29.6 pg      MCHC 33.0 g/dL      RDW 13.1 %      MPV 9.3 fL      Platelets 214 Thousands/uL      nRBC 0 /100 WBCs      Segmented % 83 %      Immature Grans % 0 %      Lymphocytes % 9 %      Monocytes % 7 %      Eosinophils  Relative 0 %      Basophils Relative 1 %      Absolute Neutrophils 5.55 Thousands/µL      Absolute Immature Grans 0.02 Thousand/uL      Absolute Lymphocytes 0.60 Thousands/µL      Absolute Monocytes 0.46 Thousand/µL      Eosinophils Absolute 0.03 Thousand/µL      Basophils Absolute 0.06 Thousands/µL             CT chest with contrast   Final Interpretation by Bianca Flores MD (06/01 3652)      Patchy groundglass opacity and tree-in-bud nodularity in the right middle lobe and left lower lobe and mild groundglass opacity in the right lower lobe compatible with aspiration.      Extensive hyperdensity in the right middle lobe and both lower lobes compatible with aspirated barium.      Multiple mediastinal nodes, normal in size, slightly increased in number, of uncertain etiology and significance.            Computerized Assisted Algorithm (CAA) may have aided analysis of applicable images.         Workstation performed: [id number]         CT soft tissue neck with contrast   Final Interpretation by Pallav N Shah, MD (06/01 7658)   Postoperative changes of left parotidectomy and radical neck dissection with flap reconstruction. Asymmetric soft tissue thickening throughout the left neck with encasement of the left common carotid and cervical ICA. While this may represent    posttreatment change, underlying recurrent neoplastic disease is not excluded. Correlation with prior imaging is recommended. Consider follow-up PET/CT imaging.      Edematous appearance to the supraglottic and glottic soft tissues with mild airway narrowing.      Heterogeneous appearance of the thyroid gland, thyroiditis not excluded.      Multilevel cervical spondylosis with severe foraminal stenosis.      Partially visualized air-fluid level within the distal esophagus. Correlate with symptoms of gastroesophageal reflux.                        Resident: ANDRIA Funk I, the attending radiologist, have reviewed the images and agree with the  final report above.      Workstation performed: [id number]             Procedures    ED Medication and Procedure Management   Prior to Admission Medications   Prescriptions Last Dose Informant Patient Reported? Taking?   PARoxetine (PAXIL) 20 mg tablet 6/1/2025 at  6:30 AM Self Yes Yes   Sig: Take 20 mg by mouth in the morning.   aspirin 81 mg chewable tablet 6/1/2025 at  6:30 AM Self Yes Yes   Sig: Chew 81 mg in the morning.   fosinopril (MONOPRIL) 40 mg tablet   No No   Sig: TAKE 1 TABLET BY MOUTH EVERY DAY   levothyroxine 100 mcg tablet 6/1/2025 at  6:30 AM  No Yes   Sig: Take 1 tablet (100 mcg total) by mouth daily   triamterene-hydrochlorothiazide (DYAZIDE) 37.5-25 mg per capsule 6/1/2025 at  6:30 AM  No Yes   Sig: TAKE 1 CAPSULE BY MOUTH EVERY DAY IN THE MORNING      Facility-Administered Medications: None     Current Discharge Medication List        START taking these medications    Details   fosinopril (MONOPRIL) 40 mg tablet TAKE 1 TABLET BY MOUTH EVERY DAY  Qty: 90 tablet, Refills: 1    Associated Diagnoses: Essential hypertension           CONTINUE these medications which have NOT CHANGED    Details   aspirin 81 mg chewable tablet Chew 81 mg in the morning.      levothyroxine 100 mcg tablet Take 1 tablet (100 mcg total) by mouth daily  Qty: 90 tablet, Refills: 0    Associated Diagnoses: Acquired hypothyroidism      PARoxetine (PAXIL) 20 mg tablet Take 20 mg by mouth in the morning.      triamterene-hydrochlorothiazide (DYAZIDE) 37.5-25 mg per capsule TAKE 1 CAPSULE BY MOUTH EVERY DAY IN THE MORNING  Qty: 90 capsule, Refills: 1    Associated Diagnoses: Essential hypertension           No discharge procedures on file.  ED SEPSIS DOCUMENTATION   Time reflects when diagnosis was documented in both MDM as applicable and the Disposition within this note       Time User Action Codes Description Comment    6/1/2025 11:46 AM Jeovanny Serna [R13.10] Dysphagia     6/1/2025  1:53 PM Mary Salcido  [C07] 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(HCC)                      [1]   Past Medical History:  Diagnosis Date    Cancer (HCC)     Dysphagia     Goiter    [2]   Past Surgical History:  Procedure Laterality Date    COLONOSCOPY      HERNIA REPAIR      HIP SURGERY Bilateral     HIP SURGERY Bilateral     HYSTERECTOMY      PAROTIDECTOMY      REPLACEMENT TOTAL KNEE BILATERAL Left     SKIN BIOPSY      UPPER GASTROINTESTINAL ENDOSCOPY     [3]   Family History  Problem Relation Name Age of Onset    No Known Problems Mother      Parkinsonism Father      Diabetes Sister      No Known Problems Brother      Esophageal cancer Brother Rebort     Dementia Maternal Grandmother      Bilateral breast cancer Daughter     [4]   Social History  Tobacco Use    Smoking status: Never     Passive exposure: Never    Smokeless tobacco: Never   Vaping Use    Vaping status: Never Used   Substance Use Topics    Alcohol use: Never    Drug use: Never        Jeovanny Serna MD  06/01/25 1924

## 2025-06-01 NOTE — ASSESSMENT & PLAN NOTE
Patient has a history of cancer of the parotid gland, underwent parotidectomy, chemo and radiation to left parotid gland.  Reports she has been cancer free  Does have a history of dysphagia secondary to this.  Dysphagia has increased over the past few days, patient unable to keep anything down.  CT of soft tissue neck performed showing postoperative changes of left parotidectomy and radical neck dissection with flap reconstruction.  Asymmetric soft tissue thickening throughout the left neck with encasement of the left common carotid and cervical ICA.  While this may represent posttreatment change underlying recurrent neoplastic disease is not excluded, correlation with prior imaging is recommended, consider follow-up PET/CT imaging.  Edematous appearance to supraglottic and glottic soft tissues with mild airway narrowing, heterogeneous appearance of thyroid gland, thyroiditis not excluded, multilevel cervical spondylosis with severe foraminal stenosis, partially visualized air-fluid level within the distal esophagus correlate with symptoms of GERD as per radiology report.  Consult ENT

## 2025-06-01 NOTE — ASSESSMENT & PLAN NOTE
Patient normally takes levothyroxine 100 mcg daily  Discussed with on-call pharmacist, will substitute with levothyroxine 70 mcg daily, put in as a one-time order in the hopes that patient will be able to take oral on Tuesday

## 2025-06-01 NOTE — CONSULTS
Otolaryngology (ENT) Consultation Note     Kelly Lind  [id number]  1950       Chief Complaint: Dysphagia    History of Present Illness: 75-year-old woman who ENT is consulted for dysphagia.  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 Rossana Knox. She underwent left parotidectomy, neck dissection followed by postoperative chemotherapy and radiation.  She states that she did have swallowing difficulty immediately postop and that she had a PEG tube for about 6 months.  She states that since that time, she has been doing well although she did have some intermittent dysphagia but was able to eat most things.  She states that for the past 3 to 4 days, she has had a lot of mucus in her throat and she has been difficult to get anything down.  Based on the notes she has had follow-up with gastroenterology in the past and notably had some swallowing difficulty at the end of 2023 and underwent modified barium swallow study at that time.  Currently she is not on anything for reflux.  She did get a CT scan which did show some swelling of the laryngeal tissues, and some indeterminant soft tissue scarring in the left neck.    Allergies   Allergen Reactions    Amoxicillin Swelling       Past Medical History[1]    Past Surgical History[2]    Social History     Socioeconomic History    Marital status: /Civil Union     Spouse name: Not on file    Number of children: 1    Years of education: Not on file    Highest education level: Not on file   Occupational History    Not on file   Tobacco Use    Smoking status: Never     Passive exposure: Never    Smokeless tobacco: Never   Vaping Use    Vaping status: Never Used   Substance and Sexual Activity    Alcohol use: Never    Drug use: Never    Sexual activity: Not Currently     Partners: Male     Birth control/protection: Abstinence   Other Topics Concern    Not on file   Social History Narrative    Not on file     Social  Drivers of Health     Financial Resource Strain: Low Risk  (2023)    Overall Financial Resource Strain (CARDIA)     Difficulty of Paying Living Expenses: Not very hard   Food Insecurity: No Food Insecurity (2025)    Nursing - Inadequate Food Risk Classification     Worried About Running Out of Food in the Last Year: Never true     Ran Out of Food in the Last Year: Never true     Ran Out of Food in the Last Year: Never true   Transportation Needs: No Transportation Needs (2025)    Nursing - Transportation Risk Classification     Lack of Transportation: Not on file     Lack of Transportation: No   Physical Activity: Not on file   Stress: Not on file   Social Connections: Not on file   Intimate Partner Violence: Unknown (2025)    Nursing IPS     Feels Physically and Emotionally Safe: Not on file     Physically Hurt by Someone: Not on file     Humiliated or Emotionally Abused by Someone: Not on file     Physically Hurt by Someone: No     Hurt or Threatened by Someone: No   Housing Stability: Unknown (2025)    Nursing: Inadequate Housing Risk Classification     Has Housing: Not on file     Worried About Losing Housing: Not on file     Unable to Get Utilities: Not on file     Unable to Pay for Housing in the Last Year: No     Has Housin       Family History[3]    Medications Ordered Prior to Encounter[4]    Review of Systems:  10-point ROS performed.  Patient denies fevers or chills.  All other systems reviewed and found to be negative unless otherwise noted in the HPI.      Vitals:    25 1423   BP: 149/87   Pulse: 81   Resp: 17   Temp: 98 °F (36.7 °C)   SpO2: 95%       General Appearance: No apparent distress    Head: Normocephalic, atraumatic.     Face: Symmetric without obvious lesions.    Eyes: Conjunctiva clear, extraocular movements are intact.    Ears: Pinna normal shape and position.  Canals are clear.  TMs intact with no middle ear effusion.    Nose: External pyramid midline.  Mucosa  appears healthy.  Turbinates are normal in size.  Septum relatively midline.      Oral cavity/Oropharynx: No mucosal lesions, masses, or pharyngeal asymmetry.     Neck: She does have extensive postoperative changes on the left side of the parotid area extending into the neck, I would describe this as Boyde due to some of the postoperative radiation.    Skin: Skin warm and dry.    Neurological: Cranial nerves II to XII are intact.    Respiratory: No stridor or labored breathing.    Cardiovascular: Good perfusion of the upper extremities.  No cyanosis of the fingers or hands.     Psychiatric: Alert and oriented.    Data Reviewed: CT scans reviewed.  This shows that she is status post left parotidectomy as well as left neck dissection.  There is soft tissue scarring along the left side, but I believe this mostly to be postoperative change.    Assessment: Dysphagia    Plan: At this time, the cause of her dysphagia is still somewhat unclear.  She has had baseline dysphagia in the past, but this seems to be worse than her norm.  On examination, she does have some edematous supraglottic tissues, and so perhaps there could be some supraglottitis going on although she is not showing much signs of infection.  I do think that she would benefit from short burst of steroid to try and calm some of the inflammation down.  In addition to this, I do think that we should treat extra esophageal reflux and starting PPI therapy may be helpful.  In the meantime, will consult speech therapy.  They will work with her and possibly perform another modified barium swallow study.  GI consult also perhaps could be helpful as with radiation he could potentially have some sort of scarring/esophageal motility issues/stricture, etc.    Keagan Vazquez MD  Otolaryngology - Head & Neck Surgery  [facility]    OTOLARYNGOLOGY PROCEDURE NOTE    PRE-OP DIAGNOSIS: The primary encounter diagnosis was Dysphagia. A diagnosis of Cancer  of parotid gland (HCC) was also pertinent to this visit.    POST-OP DIAGNOSIS: Same    PROCEDURE: Flexible laryngoscopy    DESCRIPTION OF PROCEDURE: After adequate anesthesia and decongestion with a mixture of Afrin and lidocaine, the scope was passed into the bilateral nasal cavity.  I did not see any purulence, polyps, or crusting.  The middle meatus and sphenoethmoid recess are clear.  On examination the hypopharynx and larynx, the patient did have edematous postcricoid and arytenoid area.  She did have copious amounts of foamy white secretions in the piriform sinuses and vallecula.  No negrita abnormality including mass, nodules, cysts, polyps.  Both vocal cords appear to move okay.  No other major issues.      Keagan Vazquez MD             [1]   Past Medical History:  Diagnosis Date    Cancer (HCC)     Dysphagia     Goiter    [2]   Past Surgical History:  Procedure Laterality Date    COLONOSCOPY      HERNIA REPAIR      HIP SURGERY Bilateral     HIP SURGERY Bilateral     HYSTERECTOMY      PAROTIDECTOMY      REPLACEMENT TOTAL KNEE BILATERAL Left     SKIN BIOPSY      UPPER GASTROINTESTINAL ENDOSCOPY     [3]   Family History  Problem Relation Name Age of Onset    No Known Problems Mother      Parkinsonism Father      Diabetes Sister      No Known Problems Brother      Esophageal cancer Brother Rebort     Dementia Maternal Grandmother      Bilateral breast cancer Daughter     [4]   No current facility-administered medications on file prior to encounter.     Current Outpatient Medications on File Prior to Encounter   Medication Sig Dispense Refill    aspirin 81 mg chewable tablet Chew 81 mg in the morning.      levothyroxine 100 mcg tablet Take 1 tablet (100 mcg total) by mouth daily 90 tablet 0    PARoxetine (PAXIL) 20 mg tablet Take 20 mg by mouth in the morning.      triamterene-hydrochlorothiazide (DYAZIDE) 37.5-25 mg per capsule TAKE 1 CAPSULE BY MOUTH EVERY DAY IN THE MORNING 90 capsule 1    [DISCONTINUED]  fosinopril (MONOPRIL) 40 mg tablet TAKE 1 TABLET BY MOUTH EVERY DAY 90 tablet 1    fosinopril (MONOPRIL) 40 mg tablet TAKE 1 TABLET BY MOUTH EVERY DAY 90 tablet 1

## 2025-06-01 NOTE — ASSESSMENT & PLAN NOTE
Patient reports history of dysphagia, worsening over the past few days  Last night was not able to get any food down, was coming right back up into her mouth and through her nose.  She attempted to have some soft scrambled eggs this morning and once again was having reflux, unable to keep anything down.  She presented to the emergency department for further evaluation and treatment  CTA chest was performed showing patchy groundglass opacity and tree-in-bud nodularity in the right middle lobe and left lower lobe and mild groundglass opacity in right lower lobe compatible with aspiration.  Extensive hyperdensity in the right middle lobe and both lower lobes compatible with aspirated barium, multiple mediastinal nodes normal in size slightly increased number of uncertain etiology and significance as per radiology report.  CT of soft tissue neck with contrast performed, pending  GI consulted  N.p.o.  IV fluids  Patient is able to clear her secretions, continue to monitor closely

## 2025-06-01 NOTE — H&P
H&P - Hospitalist   Name: Kelly Lind 75 y.o. female I MRN: [id number]  Unit/Bed#: [address] [place] [address] Date of Admission: 6/1/2025   Date of Service: 6/1/2025 I Hospital Day: 0     Assessment & Plan  Dysphagia  Patient reports history of dysphagia, worsening over the past few days  Last night was not able to get any food down, was coming right back up into her mouth and through her nose.  She attempted to have some soft scrambled eggs this morning and once again was having reflux, unable to keep anything down.  She presented to the emergency department for further evaluation and treatment  CTA chest was performed showing patchy groundglass opacity and tree-in-bud nodularity in the right middle lobe and left lower lobe and mild groundglass opacity in right lower lobe compatible with aspiration.  Extensive hyperdensity in the right middle lobe and both lower lobes compatible with aspirated barium, multiple mediastinal nodes normal in size slightly increased number of uncertain etiology and significance as per radiology report.  CT of soft tissue neck with contrast performed, pending  GI consulted  N.p.o.  IV fluids  Patient is able to clear her secretions, continue to monitor closely  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(HCC)  Patient has a history of cancer of the parotid gland, underwent parotidectomy, chemo and radiation to left parotid gland.  Reports she has been cancer free  Does have a history of dysphagia secondary to this.  Dysphagia has increased over the past few days, patient unable to keep anything down.  CT of soft tissue neck performed showing postoperative changes of left parotidectomy and radical neck dissection with flap reconstruction.  Asymmetric soft tissue thickening throughout the left neck with encasement of the left common carotid and cervical ICA.  While this may represent posttreatment change underlying recurrent neoplastic disease is not excluded, correlation with prior imaging is recommended, consider  follow-up PET/CT imaging.  Edematous appearance to supraglottic and glottic soft tissues with mild airway narrowing, heterogeneous appearance of thyroid gland, thyroiditis not excluded, multilevel cervical spondylosis with severe foraminal stenosis, partially visualized air-fluid level within the distal esophagus correlate with symptoms of GERD as per radiology report.  Consult ENT  Hyponatremia  Mild hyponatremia present on admission as evidenced by sodium 131 suspect secondary to poor oral intake.  As patient will be n.p.o. will give Isolyte 75 cc an hour  Repeat BMP in a.m.  Essential hypertension  Patient has a history of hypertension, normally takes Dyazide and Monopril  As patient is unable to swallow we will do as needed hydralazine for SBP greater than 180 and DBP greater than 90  Monitor vital signs as per unit routine  Acquired hypothyroidism  Patient normally takes levothyroxine 100 mcg daily  Discussed with on-call pharmacist, will substitute with levothyroxine 70 mcg daily, put in as a one-time order in the hopes that patient will be able to take oral on Tuesday  Anxiety  Patient normally is on Paxil 20 mg p.o. daily, hold currently due to dysphagia.  Supportive care      VTE Pharmacologic Prophylaxis:   Moderate Risk (Score 3-4) - Pharmacological DVT Prophylaxis Ordered: enoxaparin (Lovenox).  Code Status: Level 1 - Full Code discussed with patient at bedside   Discussion with family: Patient indicated she had called daughter.     Anticipated Length of Stay: Patient will be admitted on an inpatient basis with an anticipated length of stay of greater than 2 midnights secondary to dysphagia, IV fluids, repeat labs, GI consultation, n.p.o in anticipation of possible EGD.    History of Present Illness   Chief Complaint: Dysphagia    Kelly Lind is a 75 y.o. female with a PMH of parotid cancer status post parotidectomy, radiation and chemotherapy, dysphagia, hypertension and hypothyroidism who presents  with dysphagia.  The patient reports that she has a history of dysphagia however has worsened over the past 3 days to the point where she cannot keep anything down and is regurgitating everything.  She presented to the emergency department for further evaluation and treatment.  In the ED CT of the chest was performed showing hyperdensity right lower lobe patchy groundglass opacity and tree-in-bud nodularity, right middle lobe multiple mediastinal nodes normal in size however increased in number of uncertain etiology and significance as per radiology report.  CT of the neck/soft tissue  . showing postoperative changes of left parotidectomy and radical neck dissection with flap reconstruction.  Asymmetric soft tissue thickening throughout the left neck with encasement of the left common carotid and cervical ICA.  While this may represent posttreatment change underlying recurrent neoplastic disease is not excluded, correlation with prior imaging is recommended, consider follow-up PET/CT imaging.  Edematous appearance to supraglottic and glottic soft tissues with mild airway narrowing, heterogeneous appearance of thyroid gland, thyroiditis not excluded, multilevel cervical spondylosis with severe foraminal stenosis, partially visualized air-fluid level within the distal esophagus correlate with symptoms of GERD as per radiology report. Consult ENT.  Case was discussed by ED provider with GI, plan for n.p.o. and possible EGD tomorrow.  Patient was noted to have mild hyponatremia of sodium 131, will give Isolyte at 75 cc an hour for IV hydration.  Patient denies any pain.  She is able to clear her secretions at this time, will continue to monitor closely.  Plan was discussed with the patient at the bedside, she verbalized understanding and is agreeable to the plan.  CODE STATUS was discussed with the patient at the bedside and she is to be a full code..    Review of Systems   Constitutional:  Negative for activity change,  appetite change, chills and fever.   HENT:  Positive for trouble swallowing.    Eyes: Negative.    Respiratory: Negative.     Cardiovascular: Negative.    Gastrointestinal: Negative.  Negative for nausea and vomiting.   Endocrine: Negative.    Genitourinary: Negative.    Musculoskeletal: Negative.    Skin: Negative.    Allergic/Immunologic: Negative.    Neurological: Negative.    Hematological: Negative.    Psychiatric/Behavioral: Negative.         Historical Information   Past Medical History[1]  Past Surgical History[2]  Social History[3]  E-Cigarette/Vaping    E-Cigarette Use Never User      E-Cigarette/Vaping Substances    Nicotine No     THC No     CBD No     Flavoring No     Other No     Unknown No      Family History[4]  Social History:  Marital Status: /Civil Union   Occupation: Works part-time repairing machinery/floor strippers  Patient Pre-hospital Living Situation: Home, Alone  Patient Pre-hospital Level of Mobility: walks  Patient Pre-hospital Diet Restrictions: None    Meds/Allergies   I have reviewed home medications with patient personally.  Prior to Admission medications    Medication Sig Start Date End Date Taking? Authorizing Provider   aspirin 81 mg chewable tablet Chew 81 mg in the morning.   Yes Historical Provider, MD   levothyroxine 100 mcg tablet Take 1 tablet (100 mcg total) by mouth daily 4/23/25  Yes VENU Martínez   PARoxetine (PAXIL) 20 mg tablet Take 20 mg by mouth in the morning.   Yes Historical Provider, MD   triamterene-hydrochlorothiazide (DYAZIDE) 37.5-25 mg per capsule TAKE 1 CAPSULE BY MOUTH EVERY DAY IN THE MORNING 1/29/25  Yes Rubi Leo MD   fosinopril (MONOPRIL) 40 mg tablet TAKE 1 TABLET BY MOUTH EVERY DAY 3/3/25 6/1/25 Yes Rubi Leo MD   fosinopril (MONOPRIL) 40 mg tablet TAKE 1 TABLET BY MOUTH EVERY DAY 6/1/25   Rubi Leo MD     Allergies   Allergen Reactions    Amoxicillin Swelling       Objective :  Temp:  [98 °F (36.7 °C)] 98 °F (36.7  °C)  HR:  [] 81  BP: (148-162)/(77-93) 149/87  Resp:  [17-20] 17  SpO2:  [92 %-98 %] 95 %  O2 Device: None (Room air)    Physical Exam  Vitals and nursing note reviewed.   Constitutional:       Appearance: Normal appearance.   HENT:      Head: Normocephalic.      Nose: Nose normal.      Mouth/Throat:      Mouth: Mucous membranes are moist.     Eyes:      Extraocular Movements: Extraocular movements intact.      Conjunctiva/sclera: Conjunctivae normal.      Pupils: Pupils are equal, round, and reactive to light.     Neck:      Comments: Patient has scarring on left side of neck; hx of parotidectomy, radiation to that region  Cardiovascular:      Rate and Rhythm: Normal rate and regular rhythm.      Pulses: Normal pulses.      Heart sounds: Normal heart sounds.   Pulmonary:      Effort: Pulmonary effort is normal.      Breath sounds: Normal breath sounds.   Abdominal:      General: There is no distension.      Palpations: Abdomen is soft.      Tenderness: There is no abdominal tenderness.   Genitourinary:     Comments: Voiding spontaneously     Musculoskeletal:         General: Normal range of motion.      Right lower leg: No edema.      Left lower leg: No edema.     Skin:     General: Skin is warm.      Capillary Refill: Capillary refill takes less than 2 seconds.     Neurological:      General: No focal deficit present.      Mental Status: She is alert and oriented to person, place, and time.     Psychiatric:         Mood and Affect: Mood normal.         Behavior: Behavior normal.         Thought Content: Thought content normal.         Judgment: Judgment normal.          Lines/Drains:            Lab Results: I have reviewed the following results:  Results from last 7 days   Lab Units 06/01/25  1122   WBC Thousand/uL 6.72   HEMOGLOBIN g/dL 13.9   HEMATOCRIT % 42.1   PLATELETS Thousands/uL 214   SEGS PCT % 83*   LYMPHO PCT % 9*   MONO PCT % 7   EOS PCT % 0     Results from last 7 days   Lab Units 06/01/25  1122  "  SODIUM mmol/L 131*   POTASSIUM mmol/L 3.6   CHLORIDE mmol/L 92*   CO2 mmol/L 30   BUN mg/dL 9   CREATININE mg/dL 0.68   ANION GAP mmol/L 9   CALCIUM mg/dL 9.4   ALBUMIN g/dL 4.3   TOTAL BILIRUBIN mg/dL 0.67   ALK PHOS U/L 82   ALT U/L 11   AST U/L 17   GLUCOSE RANDOM mg/dL 107             No results found for: \"HGBA1C\"        Imaging Results Review: I reviewed radiology reports from this admission including: CT chest.  Other Study Results Review: No additional pertinent studies reviewed.    Administrative Statements   I have spent a total time of greater than 45 minutes in caring for this patient on the day of the visit/encounter including Diagnostic results, Impressions, Counseling / Coordination of care, Documenting in the medical record, Reviewing/placing orders in the medical record (including tests, medications, and/or procedures), Obtaining or reviewing history  , and Communicating with other healthcare professionals .    ** Please Note: This note has been constructed using a voice recognition system. **         [1]   Past Medical History:  Diagnosis Date    Cancer (HCC)     Dysphagia     Goiter    [2]   Past Surgical History:  Procedure Laterality Date    COLONOSCOPY      HERNIA REPAIR      HIP SURGERY Bilateral     HIP SURGERY Bilateral     HYSTERECTOMY      PAROTIDECTOMY      REPLACEMENT TOTAL KNEE BILATERAL Left     SKIN BIOPSY      UPPER GASTROINTESTINAL ENDOSCOPY     [3]   Social History  Tobacco Use    Smoking status: Never     Passive exposure: Never    Smokeless tobacco: Never   Vaping Use    Vaping status: Never Used   Substance and Sexual Activity    Alcohol use: Never    Drug use: Never    Sexual activity: Not Currently     Partners: Male     Birth control/protection: Abstinence   [4]   Family History  Problem Relation Name Age of Onset    No Known Problems Mother      Parkinsonism Father      Diabetes Sister      No Known Problems Brother      Esophageal cancer Brother Rebort     Dementia " Maternal Grandmother      Bilateral breast cancer Daughter

## 2025-06-01 NOTE — ASSESSMENT & PLAN NOTE
Patient has a history of hypertension, normally takes Dyazide and Monopril  As patient is unable to swallow we will do as needed hydralazine for SBP greater than 180 and DBP greater than 90  Monitor vital signs as per unit routine

## 2025-06-01 NOTE — ASSESSMENT & PLAN NOTE
Mild hyponatremia present on admission as evidenced by sodium 131 suspect secondary to poor oral intake.  As patient will be n.p.o. will give Isolyte 75 cc an hour  Repeat BMP in a.m.

## 2025-06-02 ENCOUNTER — ANESTHESIA (INPATIENT)
Dept: PERIOP | Facility: HOSPITAL | Age: 75
DRG: 391 | End: 2025-06-02
Payer: MEDICARE

## 2025-06-02 ENCOUNTER — APPOINTMENT (INPATIENT)
Dept: PERIOP | Facility: HOSPITAL | Age: 75
DRG: 391 | End: 2025-06-02
Attending: NURSE PRACTITIONER
Payer: MEDICARE

## 2025-06-02 ENCOUNTER — ANESTHESIA EVENT (INPATIENT)
Dept: PERIOP | Facility: HOSPITAL | Age: 75
DRG: 391 | End: 2025-06-02
Payer: MEDICARE

## 2025-06-02 PROBLEM — J69.0 ASPIRATION PNEUMONITIS (HCC): Status: ACTIVE | Noted: 2025-06-02

## 2025-06-02 LAB
ANION GAP SERPL CALCULATED.3IONS-SCNC: 17 MMOL/L (ref 4–13)
BUN SERPL-MCNC: 15 MG/DL (ref 5–25)
CALCIUM SERPL-MCNC: 9.4 MG/DL (ref 8.4–10.2)
CHLORIDE SERPL-SCNC: 94 MMOL/L (ref 96–108)
CO2 SERPL-SCNC: 22 MMOL/L (ref 21–32)
CREAT SERPL-MCNC: 0.71 MG/DL (ref 0.6–1.3)
ERYTHROCYTE [DISTWIDTH] IN BLOOD BY AUTOMATED COUNT: 12.9 % (ref 11.6–15.1)
GFR SERPL CREATININE-BSD FRML MDRD: 84 ML/MIN/1.73SQ M
GLUCOSE SERPL-MCNC: 121 MG/DL (ref 65–140)
HCT VFR BLD AUTO: 43.6 % (ref 34.8–46.1)
HGB BLD-MCNC: 14 G/DL (ref 11.5–15.4)
MCH RBC QN AUTO: 29.4 PG (ref 26.8–34.3)
MCHC RBC AUTO-ENTMCNC: 32.1 G/DL (ref 31.4–37.4)
MCV RBC AUTO: 92 FL (ref 82–98)
PLATELET # BLD AUTO: 212 THOUSANDS/UL (ref 149–390)
PMV BLD AUTO: 9.6 FL (ref 8.9–12.7)
POTASSIUM SERPL-SCNC: 3.8 MMOL/L (ref 3.5–5.3)
RBC # BLD AUTO: 4.76 MILLION/UL (ref 3.81–5.12)
SODIUM SERPL-SCNC: 133 MMOL/L (ref 135–147)
WBC # BLD AUTO: 5.64 THOUSAND/UL (ref 4.31–10.16)

## 2025-06-02 PROCEDURE — 85027 COMPLETE CBC AUTOMATED: CPT | Performed by: NURSE PRACTITIONER

## 2025-06-02 PROCEDURE — 43249 ESOPH EGD DILATION <30 MM: CPT | Performed by: INTERNAL MEDICINE

## 2025-06-02 PROCEDURE — 80048 BASIC METABOLIC PNL TOTAL CA: CPT | Performed by: NURSE PRACTITIONER

## 2025-06-02 PROCEDURE — 99232 SBSQ HOSP IP/OBS MODERATE 35: CPT

## 2025-06-02 PROCEDURE — 99222 1ST HOSP IP/OBS MODERATE 55: CPT | Performed by: INTERNAL MEDICINE

## 2025-06-02 PROCEDURE — 0D718ZZ DILATION OF UPPER ESOPHAGUS, VIA NATURAL OR ARTIFICIAL OPENING ENDOSCOPIC: ICD-10-PCS | Performed by: INTERNAL MEDICINE

## 2025-06-02 RX ORDER — PROPOFOL 10 MG/ML
INJECTION, EMULSION INTRAVENOUS AS NEEDED
Status: DISCONTINUED | OUTPATIENT
Start: 2025-06-02 | End: 2025-06-02

## 2025-06-02 RX ORDER — SODIUM CHLORIDE, SODIUM LACTATE, POTASSIUM CHLORIDE, CALCIUM CHLORIDE 600; 310; 30; 20 MG/100ML; MG/100ML; MG/100ML; MG/100ML
INJECTION, SOLUTION INTRAVENOUS CONTINUOUS PRN
Status: DISCONTINUED | OUTPATIENT
Start: 2025-06-02 | End: 2025-06-02

## 2025-06-02 RX ORDER — LIDOCAINE HYDROCHLORIDE 10 MG/ML
INJECTION, SOLUTION EPIDURAL; INFILTRATION; INTRACAUDAL; PERINEURAL AS NEEDED
Status: DISCONTINUED | OUTPATIENT
Start: 2025-06-02 | End: 2025-06-02

## 2025-06-02 RX ORDER — SODIUM CHLORIDE, SODIUM LACTATE, POTASSIUM CHLORIDE, CALCIUM CHLORIDE 600; 310; 30; 20 MG/100ML; MG/100ML; MG/100ML; MG/100ML
125 INJECTION, SOLUTION INTRAVENOUS CONTINUOUS
Status: DISCONTINUED | OUTPATIENT
Start: 2025-06-02 | End: 2025-06-02

## 2025-06-02 RX ADMIN — PANTOPRAZOLE SODIUM 40 MG: 40 INJECTION, POWDER, FOR SOLUTION INTRAVENOUS at 08:36

## 2025-06-02 RX ADMIN — PROPOFOL 30 MG: 10 INJECTION, EMULSION INTRAVENOUS at 16:09

## 2025-06-02 RX ADMIN — LEVOTHYROXINE SODIUM ANHYDROUS 70 MCG: 100 INJECTION, POWDER, LYOPHILIZED, FOR SOLUTION INTRAVENOUS at 05:36

## 2025-06-02 RX ADMIN — SODIUM CHLORIDE, SODIUM LACTATE, POTASSIUM CHLORIDE, AND CALCIUM CHLORIDE: .6; .31; .03; .02 INJECTION, SOLUTION INTRAVENOUS at 15:58

## 2025-06-02 RX ADMIN — SODIUM CHLORIDE, SODIUM GLUCONATE, SODIUM ACETATE, POTASSIUM CHLORIDE, MAGNESIUM CHLORIDE, SODIUM PHOSPHATE, DIBASIC, AND POTASSIUM PHOSPHATE 75 ML/HR: .53; .5; .37; .037; .03; .012; .00082 INJECTION, SOLUTION INTRAVENOUS at 20:15

## 2025-06-02 RX ADMIN — ENOXAPARIN SODIUM 40 MG: 40 INJECTION SUBCUTANEOUS at 08:36

## 2025-06-02 RX ADMIN — METHYLPREDNISOLONE SODIUM SUCCINATE 40 MG: 40 INJECTION, POWDER, FOR SOLUTION INTRAMUSCULAR; INTRAVENOUS at 08:36

## 2025-06-02 RX ADMIN — LIDOCAINE HYDROCHLORIDE 50 MG: 10 INJECTION, SOLUTION EPIDURAL; INFILTRATION; INTRACAUDAL; PERINEURAL at 16:05

## 2025-06-02 RX ADMIN — PROPOFOL 40 MG: 10 INJECTION, EMULSION INTRAVENOUS at 16:06

## 2025-06-02 RX ADMIN — PROPOFOL 20 MG: 10 INJECTION, EMULSION INTRAVENOUS at 16:19

## 2025-06-02 RX ADMIN — SODIUM CHLORIDE, SODIUM GLUCONATE, SODIUM ACETATE, POTASSIUM CHLORIDE, MAGNESIUM CHLORIDE, SODIUM PHOSPHATE, DIBASIC, AND POTASSIUM PHOSPHATE 75 ML/HR: .53; .5; .37; .037; .03; .012; .00082 INJECTION, SOLUTION INTRAVENOUS at 04:43

## 2025-06-02 RX ADMIN — PROPOFOL 30 MG: 10 INJECTION, EMULSION INTRAVENOUS at 16:14

## 2025-06-02 RX ADMIN — PANTOPRAZOLE SODIUM 40 MG: 40 INJECTION, POWDER, FOR SOLUTION INTRAVENOUS at 21:42

## 2025-06-02 RX ADMIN — PROPOFOL 70 MG: 10 INJECTION, EMULSION INTRAVENOUS at 16:05

## 2025-06-02 NOTE — ASSESSMENT & PLAN NOTE
Patient normally takes levothyroxine 100 mcg daily  Prior provider discussed with on-call pharmacist, will substitute with levothyroxine 70 mcg daily, put in as a one-time order in the hopes that patient will be able to take oral on Tuesday

## 2025-06-02 NOTE — ASSESSMENT & PLAN NOTE
Sodium 131 on admission, likely due to poor oral intake in setting of dysphagia  Currently on IVF while NPO, sodium 133 this morning  Daily bmp

## 2025-06-02 NOTE — PLAN OF CARE
Problem: PAIN - ADULT  Goal: Verbalizes/displays adequate comfort level or baseline comfort level  Description: Interventions:  - Encourage patient to monitor pain and request assistance  - Assess pain using appropriate pain scale  - Administer analgesics as ordered based on type and severity of pain and evaluate response  - Implement non-pharmacological measures as appropriate and evaluate response  - Consider cultural and social influences on pain and pain management  - Notify physician/advanced practitioner if interventions unsuccessful or patient reports new pain  - Educate patient/family on pain management process including their role and importance of  reporting pain   - Provide non-pharmacologic/complimentary pain relief interventions  Outcome: Progressing     Problem: INFECTION - ADULT  Goal: Absence or prevention of progression during hospitalization  Description: INTERVENTIONS:  - Assess and monitor for signs and symptoms of infection  - Monitor lab/diagnostic results  - Monitor all insertion sites, i.e. indwelling lines, tubes, and drains  - Monitor endotracheal if appropriate and nasal secretions for changes in amount and color  - [place] appropriate cooling/warming therapies per order  - Administer medications as ordered  - Instruct and encourage patient and family to use good hand hygiene technique  - Identify and instruct in appropriate isolation precautions for identified infection/condition  Outcome: Progressing  Goal: Absence of fever/infection during neutropenic period  Description: INTERVENTIONS:  - Monitor WBC  - Perform strict hand hygiene  - Limit to healthy visitors only  - No plants, dried, fresh or silk flowers with glaser in patient room  Outcome: Progressing     Problem: SAFETY ADULT  Goal: Patient will remain free of falls  Description: INTERVENTIONS:  - Educate patient/family on patient safety including physical limitations  - Instruct patient to call for assistance with activity   -  Consider consulting OT/PT to assist with strengthening/mobility based on AM PAC & JH-HLM score  - Consult OT/PT to assist with strengthening/mobility   - Keep Call bell within reach  - Keep bed low and locked with side rails adjusted as appropriate  - Keep care items and personal belongings within reach  - Initiate and maintain comfort rounds  - Make Fall Risk Sign visible to staff  - Offer Toileting every 2 Hours, in advance of need  - Initiate/Maintain bed/chair alarm  - Obtain necessary fall risk management equipment: bed/chair alarm  - Apply yellow socks and bracelet for high fall risk patients  - Consider moving patient to room near nurses station  Outcome: Progressing  Goal: Maintain or return to baseline ADL function  Description: INTERVENTIONS:  -  Assess patient's ability to carry out ADLs; assess patient's baseline for ADL function and identify physical deficits which impact ability to perform ADLs (bathing, care of mouth/teeth, toileting, grooming, dressing, etc.)  - Assess/evaluate cause of self-care deficits   - Assess range of motion  - Assess patient's mobility; develop plan if impaired  - Assess patient's need for assistive devices and provide as appropriate  - Encourage maximum independence but intervene and supervise when necessary  - Involve family in performance of ADLs  - Assess for home care needs following discharge   - Consider OT consult to assist with ADL evaluation and planning for discharge  - Provide patient education as appropriate  - Monitor functional capacity and physical performance, use of AM PAC & JH-HLM   - Monitor gait, balance and fatigue with ambulation    Outcome: Progressing  Goal: Maintains/Returns to pre admission functional level  Description: INTERVENTIONS:  - Perform AM-PAC 6 Click Basic Mobility/ Daily Activity assessment daily.  - Set and communicate daily mobility goal to care team and patient/family/caregiver.   - Collaborate with rehabilitation services on mobility  goals if consulted  - Perform Range of Motion 3 times a day.  - Reposition patient every 3 hours.  - Dangle patient 3 times a day  - Stand patient 3 times a day  - Ambulate patient 3 times a day  - Out of bed to chair 3 times a day   - Out of bed for meals 3 times a day  - Out of bed for toileting  - Record patient progress and toleration of activity level   Outcome: Progressing     Problem: DISCHARGE PLANNING  Goal: Discharge to home or other facility with appropriate resources  Description: INTERVENTIONS:  - Identify barriers to discharge w/patient and caregiver  - Arrange for needed discharge resources and transportation as appropriate  - Identify discharge learning needs (meds, wound care, etc.)  - Arrange for interpretive services to assist at discharge as needed  - Refer to Case Management Department for coordinating discharge planning if the patient needs post-hospital services based on physician/advanced practitioner order or complex needs related to functional status, cognitive ability, or social support system  Outcome: Progressing     Problem: Knowledge Deficit  Goal: Patient/family/caregiver demonstrates understanding of disease process, treatment plan, medications, and discharge instructions  Description: Complete learning assessment and assess knowledge base.  Interventions:  - Provide teaching at level of understanding  - Provide teaching via preferred learning methods  Outcome: Progressing

## 2025-06-02 NOTE — ASSESSMENT & PLAN NOTE
Patient normally is on Paxil 20 mg p.o. daily, hold currently due to dysphagia and NPO status  Supportive care

## 2025-06-02 NOTE — SPEECH THERAPY NOTE
Consult received.  Review of records begun (noted impression from ENT evaluation, CT of chest and CT of soft tissue of neck).  Patient known to me from MBS of 11/14/2023 which was completed after barium swallow 11/6/2023 during which she presented with negrita aspiration with liquid).   Plan: Patient n.p.o. for additional evaluation, including GI evaluation at this time.  SLP evaluation to follow (recommend repeat MBS.  Previous MBS revealed significant dysphagia which I presume is related to her XRT plus the presence of large osteophyte).   Joanie Barber MS,SLP  [place] License [id number] License [id number]

## 2025-06-02 NOTE — ASSESSMENT & PLAN NOTE
Home regimen includes fosinopril and Dyazide, currently held due to dysphagia/NPO status  BP acceptable, hydralazine as needed  Resume oral medications when able

## 2025-06-02 NOTE — CONSULTS
Consultation - Gastroenterology   Name: Kelly Lind 75 y.o. female I MRN: [id number]  Unit/Bed#: [address] [place] [address] I Date of Admission: 6/1/2025   Date of Service: 6/2/2025 I Hospital Day: 1   Inpatient consult to gastroenterology  Consult performed by: VENU Sanderson  Consult ordered by: VENU Hull        Physician Requesting Evaluation: Essence Hunter MD   Reason for Evaluation / Principal Problem: Dysphagia, cancer parotid gland    Assessment & Plan  Dysphagia  75 y.o. female with past medical history of parotid cancer s/p parotidectomy/radiation/chemotherapy, dysphagia, hypertension, hypothyroidism who presents with worsening dysphagia.  Patient reported on admission that she has had worsening dysphagia over the last 3 to 4 days at the point where she is unable to keep any food down.ENT did see patient and laryngotomy showed edematous supraglottic tissue and she was started on IV steroids and speech therapy was consulted.  Patient has history of mild oral stage dysphagia and significant pharyngeal dysphagia seen on video barium swallow 11/6/2023.      CT soft tissue neck showed edematous appearance to the supraglottic and glottic soft tissue with mild airway narrowing.  Partially visualized air-fluid level within the distal esophagus.  Multilevel cervical spondylosis with severe foraminal stenosis.   Continue diet IV twice daily  Keep n.p.o. by speech for further diet recommendations  Continue IV steroids per ENT  Scheduled for EGD today.  Prep and procedure explained to patient in detail further recommendations pending results of EGD.  Speech therapy for diet recommendations    Thank you for the consult.  Case discussed with Dr. Dai      History of Present Illness   HPI:  Kelly Lind is a 75 y.o. female with past medical history of parotid cancer s/p parotidectomy/radiation/chemotherapy, dysphagia, hypertension, hypothyroidism who presents with worsening dysphagia.  Patient  reported on admission that she has had worsening dysphagia over the last 3 to 4 days at the point where she is unable to keep any food down.  Patient denies nausea, vomiting, heartburn, epigastric or abdominal pain.  Patient reports that she goes to swallow the food and it will not go down and she needs to cough to get it up.  Patient reports she is even tried taking sips of liquid to help get the food down but that is unsuccessful.  She is able to swallow her own secretions.  Patient reported symptoms got worse when she had increased mucus developing in her upper airway.  ENT did see patient and they did a laryngotomy which did show edematous supraglottic tissue and she was started on IV steroids and speech therapy was consulted.      CT soft tissue neck with contrast done done 6/1 showed asymmetric soft tissue thickening throughout the left neck with encasement of the left common carotid and cervical ICA. While this may represent posttreatment change, underlying recurrent neoplastic disease is not excluded.   Edematous appearance to the supraglottic and glottic soft tissues with mild airway narrowing.  Heterogeneous appearance of the thyroid gland, thyroiditis not excluded.Multilevel cervical spondylosis with severe foraminal stenosis. Partially visualized air-fluid level within the distal esophagus. Correlate with symptoms of gastroesophageal reflux.      Patient had a video barium swallow with speech in the past last done 11/6/2023 which showed mild oral stage dysphagia and significant pharyngeal dysphagia.  Diet recommendations at that time warm moist food that is very well cooked and preferably soft cooked.  Consider nectar thick liquids to wash down food residual in the pharynx/throat during meals.  Medications crushed with puréed.  Barium swallow 11/6/2023 which barium swallow exam was terminated because when patient ingested first cup of barium she aspirated into the trachea he in tracheobronchial tree. No  esophageal obstruction noted emptying of contrast from esophagus is prompt.      Review of Systems   Constitutional:  Negative for chills and fever.   HENT:  Positive for trouble swallowing. Negative for ear pain and sore throat.    Eyes:  Negative for pain and visual disturbance.   Respiratory:  Negative for cough and shortness of breath.    Cardiovascular:  Negative for chest pain and palpitations.   Gastrointestinal:  Negative for abdominal pain and vomiting.   Genitourinary:  Negative for dysuria and hematuria.   Musculoskeletal:  Negative for arthralgias and back pain.   Skin:  Negative for color change and rash.   Neurological:  Negative for seizures and syncope.   All other systems reviewed and are negative.    Historical Information   Past Medical History[1]  Past Surgical History[2]  Social History[3]  E-Cigarette/Vaping    E-Cigarette Use Never User      E-Cigarette/Vaping Substances    Nicotine No     THC No     CBD No     Flavoring No     Other No     Unknown No      Family History[4]  Social History[5]    Current Facility-Administered Medications:     acetaminophen (Ofirmev) injection 1,000 mg, Q6H PRN    [Held by provider] aspirin chewable tablet 81 mg, Daily    enoxaparin (LOVENOX) subcutaneous injection 40 mg, Daily    hydrALAZINE (APRESOLINE) injection 5 mg, Q6H PRN    [Held by provider] levothyroxine tablet 100 mcg, Daily    [Held by provider] lisinopril (ZESTRIL) tablet 40 mg, Daily    methylPREDNISolone sodium succinate (Solu-MEDROL) injection 40 mg, Daily    multi-electrolyte (Plasmalyte-A/Isolyte-S PH 7.4/Normosol-R) IV solution, Continuous, Last Rate: 75 mL/hr (06/02/25 3173)    ondansetron (ZOFRAN) injection 4 mg, Q6H PRN    pantoprazole (PROTONIX) injection 40 mg, Q12H LULA    [Held by provider] PARoxetine (PAXIL) tablet 20 mg, Daily    [Held by provider] triamterene-hydrochlorothiazide (MAXZIDE-25) 37.5-25 mg per tablet 1 tablet, Daily    Prior to Admission Medications   Prescriptions Last  Dose Informant Patient Reported? Taking?   PARoxetine (PAXIL) 20 mg tablet 6/1/2025 at  6:30 AM Self Yes Yes   Sig: Take 20 mg by mouth in the morning.   aspirin 81 mg chewable tablet 6/1/2025 at  6:30 AM Self Yes Yes   Sig: Chew 81 mg in the morning.   fosinopril (MONOPRIL) 40 mg tablet   No No   Sig: TAKE 1 TABLET BY MOUTH EVERY DAY   levothyroxine 100 mcg tablet 6/1/2025 at  6:30 AM  No Yes   Sig: Take 1 tablet (100 mcg total) by mouth daily   triamterene-hydrochlorothiazide (DYAZIDE) 37.5-25 mg per capsule 6/1/2025 at  6:30 AM  No Yes   Sig: TAKE 1 CAPSULE BY MOUTH EVERY DAY IN THE MORNING      Facility-Administered Medications: None     Amoxicillin    Objective :  Temp:  [97.6 °F (36.4 °C)-99.1 °F (37.3 °C)] 97.6 °F (36.4 °C)  HR:  [73-81] 77  BP: (121-158)/(72-87) 158/78  Resp:  [17-21] 19  SpO2:  [90 %-95 %] 94 %  O2 Device: None (Room air)    Physical Exam  Vitals and nursing note reviewed.   Constitutional:       General: She is not in acute distress.     Appearance: She is well-developed.   HENT:      Head: Normocephalic and atraumatic.     Eyes:      Conjunctiva/sclera: Conjunctivae normal.       Cardiovascular:      Rate and Rhythm: Normal rate and regular rhythm.      Pulses: Normal pulses.      Heart sounds: Normal heart sounds. No murmur heard.  Pulmonary:      Effort: Pulmonary effort is normal. No respiratory distress.      Breath sounds: Normal breath sounds. No stridor. No wheezing, rhonchi or rales.   Abdominal:      General: Bowel sounds are normal. There is no distension.      Palpations: Abdomen is soft. There is no mass.      Tenderness: There is no abdominal tenderness. There is no guarding or rebound.     Musculoskeletal:         General: No swelling.      Cervical back: Neck supple.      Right lower leg: No edema.      Left lower leg: No edema.     Skin:     General: Skin is warm and dry.      Capillary Refill: Capillary refill takes less than 2 seconds.      Coloration: Skin is not  jaundiced or pale.     Neurological:      Mental Status: She is alert and oriented to person, place, and time.     Psychiatric:         Mood and Affect: Mood normal.         Lab Results: I have reviewed the following results:CBC/BMP:   .     06/02/25  0453   WBC 5.64   HGB 14.0   HCT 43.6      SODIUM 133*   K 3.8   CL 94*   CO2 22   BUN 15   CREATININE 0.71   GLUC 121        Imaging Results Review: I reviewed radiology reports from this admission including: CT chest with contrast and CT soft tissue neck with contrast.             [1]   Past Medical History:  Diagnosis Date    Cancer (HCC)     Dysphagia     Goiter    [2]   Past Surgical History:  Procedure Laterality Date    COLONOSCOPY      HERNIA REPAIR      HIP SURGERY Bilateral     HIP SURGERY Bilateral     HYSTERECTOMY      PAROTIDECTOMY      REPLACEMENT TOTAL KNEE BILATERAL Left     SKIN BIOPSY      UPPER GASTROINTESTINAL ENDOSCOPY     [3]   Social History  Tobacco Use    Smoking status: Never     Passive exposure: Never    Smokeless tobacco: Never   Vaping Use    Vaping status: Never Used   Substance and Sexual Activity    Alcohol use: Never    Drug use: Never    Sexual activity: Not Currently     Partners: Male     Birth control/protection: Abstinence   [4]   Family History  Problem Relation Name Age of Onset    No Known Problems Mother      Parkinsonism Father      Diabetes Sister      No Known Problems Brother      Esophageal cancer Brother Rebort     Dementia Maternal Grandmother      Bilateral breast cancer Daughter     [5]   Social History  Tobacco Use    Smoking status: Never     Passive exposure: Never    Smokeless tobacco: Never   Vaping Use    Vaping status: Never Used   Substance and Sexual Activity    Alcohol use: Never    Drug use: Never    Sexual activity: Not Currently     Partners: Male     Birth control/protection: Abstinence

## 2025-06-02 NOTE — PROGRESS NOTES
Progress Note - Hospitalist   Name: Kelly Lind 75 y.o. female I MRN: [id number]  Unit/Bed#: [address] [place] [address] I Date of Admission: 6/1/2025   Date of Service: 6/2/2025 I Hospital Day: 1    Assessment & Plan  Dysphagia  Patient presented to ED with worsening dysphagia past few days. 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 Unable to keep down solid foods x 3 days.  CT chest: Patchy groundglass opacity and tree-in-bud nodularity in the right middle lobe and left lower lobe and mild groundglass opacity in the right lower lobe compatible with aspiration. Extensive hyperdensity in the right middle lobe and both lower lobes compatible with aspirated barium.  CT soft tissue neck: Postoperative changes of left parotidectomy and radical neck dissection with flap reconstruction. Asymmetric soft tissue thickening throughout the left neck with encasement of the left common carotid and cervical ICA. While this may represent posttreatment change, underlying recurrent neoplastic disease is not excluded. Correlation with prior imaging is recommended. Consider follow-up PET/CT imaging.   ENT consulted, noted edematous supraglottic tissues on exam, recommended Solumedrol and PPI  GI consulted, currently NPO for EGD today  Speech consulted, noted plan for possible repeat barium swallow  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(HCC)  Patient has a history of cancer of the parotid gland s/p parotidectomy, chemo and radiation to left parotid gland now in remission  CT soft tissue neck: Postoperative changes of left parotidectomy and radical neck dissection with flap reconstruction. Asymmetric soft tissue thickening throughout the left neck with encasement of the left common carotid and cervical ICA. While this may represent posttreatment change, underlying recurrent neoplastic disease is not excluded. Correlation with prior imaging is recommended. Consider follow-up PET/CT  imaging.  Outpatient follow up with oncology  Hyponatremia  Sodium 131 on admission, likely due to poor oral intake in setting of dysphagia  Currently on IVF while NPO, sodium 133 this morning  Daily bmp  Essential hypertension  Home regimen includes fosinopril and Dyazide, currently held due to dysphagia/NPO status  BP acceptable, hydralazine as needed  Resume oral medications when able  Acquired hypothyroidism  Patient normally takes levothyroxine 100 mcg daily  Prior provider discussed with on-call pharmacist, will substitute with levothyroxine 70 mcg daily, put in as a one-time order in the hopes that patient will be able to take oral on Tuesday  Anxiety  Patient normally is on Paxil 20 mg p.o. daily, hold currently due to dysphagia and NPO status  Supportive care  Aspiration pneumonitis (HCC)  CT chest: Patchy groundglass opacity and tree-in-bud nodularity in the right middle lobe and left lower lobe and mild groundglass opacity in the right lower lobe compatible with aspiration.  POA due to dysphagia and regurgitation of food and liquid x 3 days  Afebrile, no leukocytosis, hemodynamically stable, no need for abx  Plan as above    VTE Pharmacologic Prophylaxis:   Moderate Risk (Score 3-4) - Pharmacological DVT Prophylaxis Ordered: enoxaparin (Lovenox).    Mobility:   Basic Mobility Inpatient Raw Score: 24  JH-HLM Goal: 8: Walk 250 feet or more  JH-HLM Achieved: 8: Walk 250 feet ot more  JH-HLM Goal achieved. Continue to encourage appropriate mobility.    Patient Centered Rounds: I performed bedside rounds with nursing staff today.   Discussions with Specialists or Other Care Team Provider: GI, rn    Education and Discussions with Family / Patient: Updated  (daughter) at bedside.    Current Length of Stay: 1 day(s)  Current Patient Status: Inpatient   Certification Statement: The patient will continue to require additional inpatient hospital stay due to dysphagia, EGD, speech eval  Discharge Plan:  Anticipate discharge in 24-48 hrs to home.    Code Status: Level 1 - Full Code    Subjective   Patient denies any current complaints, eager to go home. Has been NPO this morning.    Objective :  Temp:  [97.6 °F (36.4 °C)-99.1 °F (37.3 °C)] 97.6 °F (36.4 °C)  HR:  [73-81] 77  BP: (121-158)/(72-87) 158/78  Resp:  [17-21] 19  SpO2:  [90 %-95 %] 94 %  O2 Device: None (Room air)    Body mass index is 20.88 kg/m².     Input and Output Summary (last 24 hours):   No intake or output data in the 24 hours ending 06/02/25 1331    Physical Exam  Vitals and nursing note reviewed.   Constitutional:       General: She is not in acute distress.     Appearance: She is well-developed.     Cardiovascular:      Rate and Rhythm: Normal rate and regular rhythm.   Pulmonary:      Effort: Pulmonary effort is normal. No respiratory distress.      Breath sounds: Normal breath sounds.   Abdominal:      Palpations: Abdomen is soft.      Tenderness: There is no abdominal tenderness.     Musculoskeletal:         General: No swelling.     Skin:     General: Skin is warm and dry.      Capillary Refill: Capillary refill takes less than 2 seconds.     Neurological:      Mental Status: She is alert.     Psychiatric:         Mood and Affect: Mood normal.       Lines/Drains:        Lab Results: I have reviewed the following results:   Results from last 7 days   Lab Units 06/02/25  0453 06/01/25  1122   WBC Thousand/uL 5.64 6.72   HEMOGLOBIN g/dL 14.0 13.9   HEMATOCRIT % 43.6 42.1   PLATELETS Thousands/uL 212 214   SEGS PCT %  --  83*   LYMPHO PCT %  --  9*   MONO PCT %  --  7   EOS PCT %  --  0     Results from last 7 days   Lab Units 06/02/25  0453 06/01/25  1122   SODIUM mmol/L 133* 131*   POTASSIUM mmol/L 3.8 3.6   CHLORIDE mmol/L 94* 92*   CO2 mmol/L 22 30   BUN mg/dL 15 9   CREATININE mg/dL 0.71 0.68   ANION GAP mmol/L 17* 9   CALCIUM mg/dL 9.4 9.4   ALBUMIN g/dL  --  4.3   TOTAL BILIRUBIN mg/dL  --  0.67   ALK PHOS U/L  --  82   ALT U/L  --  11    AST U/L  --  17   GLUCOSE RANDOM mg/dL 121 107                       Recent Cultures (last 7 days):         Imaging Results Review: I reviewed radiology reports from this admission including: CT chest and CT soft tissue neck.  Other Study Results Review: No additional pertinent studies reviewed.    Last 24 Hours Medication List:     Current Facility-Administered Medications:     acetaminophen (Ofirmev) injection 1,000 mg, Q6H PRN    [Held by provider] aspirin chewable tablet 81 mg, Daily    enoxaparin (LOVENOX) subcutaneous injection 40 mg, Daily    hydrALAZINE (APRESOLINE) injection 5 mg, Q6H PRN    [Held by provider] levothyroxine tablet 100 mcg, Daily    [Held by provider] lisinopril (ZESTRIL) tablet 40 mg, Daily    methylPREDNISolone sodium succinate (Solu-MEDROL) injection 40 mg, Daily    multi-electrolyte (Plasmalyte-A/Isolyte-S PH 7.4/Normosol-R) IV solution, Continuous, Last Rate: 75 mL/hr (06/02/25 0443)    ondansetron (ZOFRAN) injection 4 mg, Q6H PRN    pantoprazole (PROTONIX) injection 40 mg, Q12H LULA    [Held by provider] PARoxetine (PAXIL) tablet 20 mg, Daily    [Held by provider] triamterene-hydrochlorothiazide (MAXZIDE-25) 37.5-25 mg per tablet 1 tablet, Daily    Administrative Statements   Today, Patient Was Seen By: Leslie Graham PA-C    **Please Note: This note may have been constructed using a voice recognition system.**

## 2025-06-02 NOTE — ANESTHESIA POSTPROCEDURE EVALUATION
Post-Op Assessment Note    CV Status:  Stable  Pain Score: 0    Pain management: adequate       Mental Status:  Awake   Hydration Status:  Stable   PONV Controlled:  None   Airway Patency:  Patent  Two or more mitigation strategies used for obstructive sleep apnea   Post Op Vitals Reviewed: Yes    No anethesia notable event occurred.    Staff: CRNA           Last Filed PACU Vitals:  Vitals Value Taken Time   Temp     Pulse 75    /63    Resp 14    SpO2 99

## 2025-06-02 NOTE — ASSESSMENT & PLAN NOTE
Patient presented to ED with worsening dysphagia past few days. 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 Unable to keep down solid foods x 3 days.  CT chest: Patchy groundglass opacity and tree-in-bud nodularity in the right middle lobe and left lower lobe and mild groundglass opacity in the right lower lobe compatible with aspiration. Extensive hyperdensity in the right middle lobe and both lower lobes compatible with aspirated barium.  CT soft tissue neck: Postoperative changes of left parotidectomy and radical neck dissection with flap reconstruction. Asymmetric soft tissue thickening throughout the left neck with encasement of the left common carotid and cervical ICA. While this may represent posttreatment change, underlying recurrent neoplastic disease is not excluded. Correlation with prior imaging is recommended. Consider follow-up PET/CT imaging.   ENT consulted, noted edematous supraglottic tissues on exam, recommended Solumedrol and PPI  GI consulted, currently NPO for EGD today  Speech consulted, noted plan for possible repeat barium swallow

## 2025-06-02 NOTE — ASSESSMENT & PLAN NOTE
75 y.o. female with past medical history of parotid cancer s/p parotidectomy/radiation/chemotherapy, dysphagia, hypertension, hypothyroidism who presents with worsening dysphagia.  Patient reported on admission that she has had worsening dysphagia over the last 3 to 4 days at the point where she is unable to keep any food down.ENT did see patient and laryngotomy showed edematous supraglottic tissue and she was started on IV steroids and speech therapy was consulted.  Patient has history of mild oral stage dysphagia and significant pharyngeal dysphagia seen on video barium swallow 11/6/2023.      CT soft tissue neck showed edematous appearance to the supraglottic and glottic soft tissue with mild airway narrowing.  Partially visualized air-fluid level within the distal esophagus.  Multilevel cervical spondylosis with severe foraminal stenosis.   Continue diet IV twice daily  Keep n.p.o. by speech for further diet recommendations  Continue IV steroids per ENT  Scheduled for EGD today.  Prep and procedure explained to patient in detail further recommendations pending results of EGD.  Speech therapy for diet recommendations

## 2025-06-02 NOTE — CASE MANAGEMENT
Case Management Assessment & Discharge Planning Note    Patient name Kelly Lind  Location [address] [facility]-* MRN [id number]  : 1950 Date 2025       Current Admission Date: 2025  Current Admission Diagnosis:Dysphagia   Patient Active Problem List    Diagnosis Date Noted    Hyponatremia 2025    Thyroid nodule 2024    Dysphagia 10/02/2023    Essential hypertension 2021    Acquired hypothyroidism 2021    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(HCC) 2021    Meniere's disease of both ears 2021    Anxiety 2021      LOS (days): 1  Geometric Mean LOS (GMLOS) (days): 2.5  Days to GMLOS:1.5     OBJECTIVE:    Risk of Unplanned Readmission Score: 11.09         Current admission status: Inpatient  Referral Reason: Other (Discharge planning)    Preferred Pharmacy:   [facility]/pharmacy #[id number] - [place], [place] - [address] [address] [address] [facility] [address]  Phone: [phone] Fax: [phone]    Primary Care Provider: Rubi Leo MD    Primary Insurance: MEDICARE  Secondary Insurance: BLUE CROSS    ASSESSMENT:  Active Health Care Proxies    There are no active Health Care Proxies on file.        Readmission Root Cause  30 Day Readmission: No    Patient Information  Admitted from:: Home  Mental Status: Alert  During Assessment patient was accompanied by: Daughter (Daughter present at start of visit)  Assessment information provided by:: Patient  Primary Caregiver: Self  Support Systems: Daughter, Family members, Friends/neighbors  County of Residence: [place]  What city do you live in?: [place]  Home entry access options. Select all that apply.: No steps to enter home  Type of Current Residence: Apartment (Condo)  Floor Level: 1  Upon entering residence, is there a bedroom on the main floor (no further steps)?: Yes  Upon entering residence, is there a bathroom on the main floor (no further steps)?: Yes  Living Arrangements: Lives Alone  Is patient a  ?: No    Activities of Daily Living Prior to Admission  Functional Status: Independent  Completes ADLs independently?: Yes  Ambulates independently?: Yes  Does patient use assisted devices?: No  Does patient currently own DME?: No  Does patient have a history of Outpatient Therapy (PT/OT)?: Yes  Does the patient have a history of Short-Term Rehab?: Yes (after orthopedic surgery, in [place])  Does patient have a history of HHC?: Yes (after hip replacement)  Does patient currently have HHC?: No     Patient Information Continued  Income Source: Pension/correction  Does patient have prescription coverage?: Yes  Can the patient afford their medications and any related supplies (such as glucometers or test strips)?: Yes  Does patient receive dialysis treatments?: No     Means of Transportation  Means of Transport to Appts:: Drives Self      DISCHARGE DETAILS:    Discharge planning discussed with:: Patient  Freedom of Choice: Yes    Comments - Freedom of Choice: SW spoke with patient at bedside to introduce role of CM, conduct assessment and discuss discharge planning.  Patient lives alone in a 1st floor condo, uses no assistive devices and drives.  Her preference is to return home when medically cleared and her car is in the hospital lot.  Her AM-PAC is 24 and at this time there are no rehabilitation or CM needs anticipated for discharge.  SW will continue to follow.      CM contacted family/caregiver?: Yes (Daughter at bedside at start of visit)  Were Treatment Team discharge recommendations reviewed with patient/caregiver?: Yes  Did patient/caregiver verbalize understanding of patient care needs?: Yes  Were patient/caregiver advised of the risks associated with not following Treatment Team discharge recommendations?: Yes    Contacts  Patient Contacts: Cristina Vargas (daughter)  Relationship to Patient:: Family  Contact Method: In Person  Reason/Outcome: Emergency Contact    Requested Home Health Care          Is the patient interested in HHC at discharge?: No    DME Referral Provided  Referral made for DME?: No    Other Referral/Resources/Interventions Provided:  Interventions: None Indicated    Would you like to participate in our [facility] service program?  : No - Declined    Treatment Team Recommendation: Home  Discharge Destination Plan:: Home  Transport at Discharge : Self (car in hospital lot)         IMM Given (Date):: 06/01/25

## 2025-06-02 NOTE — ANESTHESIA POSTPROCEDURE EVALUATION
Post-Op Assessment Note    CV Status:  Stable    Pain management: adequate       Mental Status:  Alert and awake   Hydration Status:  Euvolemic   PONV Controlled:  Controlled   Airway Patency:  Patent     Post Op Vitals Reviewed: Yes    No anethesia notable event occurred.    Staff: Anesthesiologist         Last Filed PACU Vitals:  Vitals Value Taken Time   Temp     Pulse     BP     Resp     SpO2

## 2025-06-02 NOTE — ASSESSMENT & PLAN NOTE
CT chest: Patchy groundglass opacity and tree-in-bud nodularity in the right middle lobe and left lower lobe and mild groundglass opacity in the right lower lobe compatible with aspiration.  POA due to dysphagia and regurgitation of food and liquid x 3 days  Afebrile, no leukocytosis, hemodynamically stable, no need for abx  Plan as above

## 2025-06-02 NOTE — NURSING NOTE
Report received from GI RN and CRNA. Call bell within reach, side rails up, bed locked and in lowest position

## 2025-06-02 NOTE — ANESTHESIA PREPROCEDURE EVALUATION
Procedure:  EGD    Relevant Problems   CARDIO   (+) Essential hypertension      ENDO   (+) Acquired hypothyroidism      GI/HEPATIC   (+) Dysphagia      NEURO/PSYCH   (+) Anxiety        Physical Exam    Airway     Mallampati score: II  TM Distance: >3 FB  Neck ROM: full  Upper bite lip test: I  Mouth opening: >= 4 cm      Cardiovascular  Cardiovascular exam normal    Dental   No notable dental hx     Pulmonary  Pulmonary exam normal     Neurological  - normal exam  She appears awake, alert and oriented x3.      Other Findings  post-pubertal.      Anesthesia Plan  ASA Score- 2     Anesthesia Type- IV sedation with anesthesia with ASA Monitors.         Additional Monitors:     Airway Plan:            Plan Factors-Exercise tolerance (METS): >4 METS.    Chart reviewed.    Patient summary reviewed.    Patient is not a current smoker.              Induction-     Postoperative Plan- .   Monitoring Plan - Monitoring plan - standard ASA monitoring      Perioperative Resuscitation Plan - Level 1 - Full Code.       Informed Consent- Anesthetic plan and risks discussed with patient.  I personally reviewed this patient with the CRNA. Discussed and agreed on the Anesthesia Plan with the CRNA..      NPO Status:  Vitals Value Taken Time   Date of last liquid 06/01/25 06/02/25 14:37   Time of last liquid 1400 06/02/25 14:37   Date of last solid 06/01/25 06/02/25 14:37   Time of last solid 1600 06/02/25 14:37

## 2025-06-02 NOTE — ASSESSMENT & PLAN NOTE
Patient has a history of cancer of the parotid gland s/p parotidectomy, chemo and radiation to left parotid gland now in remission  CT soft tissue neck: Postoperative changes of left parotidectomy and radical neck dissection with flap reconstruction. Asymmetric soft tissue thickening throughout the left neck with encasement of the left common carotid and cervical ICA. While this may represent posttreatment change, underlying recurrent neoplastic disease is not excluded. Correlation with prior imaging is recommended. Consider follow-up PET/CT imaging.  Outpatient follow up with oncology

## 2025-06-03 ENCOUNTER — APPOINTMENT (INPATIENT)
Dept: RADIOLOGY | Facility: HOSPITAL | Age: 75
DRG: 391 | End: 2025-06-03
Payer: MEDICARE

## 2025-06-03 LAB
ANION GAP SERPL CALCULATED.3IONS-SCNC: 15 MMOL/L (ref 4–13)
BUN SERPL-MCNC: 16 MG/DL (ref 5–25)
CALCIUM SERPL-MCNC: 9.1 MG/DL (ref 8.4–10.2)
CHLORIDE SERPL-SCNC: 96 MMOL/L (ref 96–108)
CO2 SERPL-SCNC: 24 MMOL/L (ref 21–32)
CREAT SERPL-MCNC: 0.59 MG/DL (ref 0.6–1.3)
ERYTHROCYTE [DISTWIDTH] IN BLOOD BY AUTOMATED COUNT: 13.2 % (ref 11.6–15.1)
GFR SERPL CREATININE-BSD FRML MDRD: 90 ML/MIN/1.73SQ M
GLUCOSE SERPL-MCNC: 299 MG/DL (ref 65–140)
GLUCOSE SERPL-MCNC: 95 MG/DL (ref 65–140)
HCT VFR BLD AUTO: 41.9 % (ref 34.8–46.1)
HGB BLD-MCNC: 13.8 G/DL (ref 11.5–15.4)
MCH RBC QN AUTO: 29.7 PG (ref 26.8–34.3)
MCHC RBC AUTO-ENTMCNC: 32.9 G/DL (ref 31.4–37.4)
MCV RBC AUTO: 90 FL (ref 82–98)
PLATELET # BLD AUTO: 216 THOUSANDS/UL (ref 149–390)
PMV BLD AUTO: 9.7 FL (ref 8.9–12.7)
POTASSIUM SERPL-SCNC: 3.3 MMOL/L (ref 3.5–5.3)
PROCALCITONIN SERPL-MCNC: <0.05 NG/ML
RBC # BLD AUTO: 4.65 MILLION/UL (ref 3.81–5.12)
SODIUM SERPL-SCNC: 135 MMOL/L (ref 135–147)
TSH SERPL DL<=0.05 MIU/L-ACNC: 3.33 UIU/ML (ref 0.45–4.5)
WBC # BLD AUTO: 8.84 THOUSAND/UL (ref 4.31–10.16)

## 2025-06-03 PROCEDURE — 80048 BASIC METABOLIC PNL TOTAL CA: CPT

## 2025-06-03 PROCEDURE — 74230 X-RAY XM SWLNG FUNCJ C+: CPT

## 2025-06-03 PROCEDURE — 82948 REAGENT STRIP/BLOOD GLUCOSE: CPT

## 2025-06-03 PROCEDURE — 99232 SBSQ HOSP IP/OBS MODERATE 35: CPT

## 2025-06-03 PROCEDURE — 84145 PROCALCITONIN (PCT): CPT | Performed by: INTERNAL MEDICINE

## 2025-06-03 PROCEDURE — 85027 COMPLETE CBC AUTOMATED: CPT

## 2025-06-03 PROCEDURE — 92610 EVALUATE SWALLOWING FUNCTION: CPT

## 2025-06-03 PROCEDURE — 92611 MOTION FLUOROSCOPY/SWALLOW: CPT

## 2025-06-03 PROCEDURE — 84443 ASSAY THYROID STIM HORMONE: CPT | Performed by: INTERNAL MEDICINE

## 2025-06-03 PROCEDURE — 99232 SBSQ HOSP IP/OBS MODERATE 35: CPT | Performed by: INTERNAL MEDICINE

## 2025-06-03 RX ORDER — POTASSIUM CHLORIDE 14.9 MG/ML
20 INJECTION INTRAVENOUS ONCE
Status: COMPLETED | OUTPATIENT
Start: 2025-06-03 | End: 2025-06-03

## 2025-06-03 RX ORDER — SODIUM CHLORIDE 9 MG/ML
75 INJECTION, SOLUTION INTRAVENOUS CONTINUOUS
Status: DISCONTINUED | OUTPATIENT
Start: 2025-06-03 | End: 2025-06-05 | Stop reason: HOSPADM

## 2025-06-03 RX ADMIN — PANTOPRAZOLE SODIUM 40 MG: 40 INJECTION, POWDER, FOR SOLUTION INTRAVENOUS at 09:13

## 2025-06-03 RX ADMIN — SODIUM CHLORIDE 75 ML/HR: 0.9 INJECTION, SOLUTION INTRAVENOUS at 14:50

## 2025-06-03 RX ADMIN — POTASSIUM CHLORIDE 20 MEQ: 14.9 INJECTION, SOLUTION INTRAVENOUS at 11:26

## 2025-06-03 RX ADMIN — PANTOPRAZOLE SODIUM 40 MG: 40 INJECTION, POWDER, FOR SOLUTION INTRAVENOUS at 21:17

## 2025-06-03 RX ADMIN — ENOXAPARIN SODIUM 40 MG: 40 INJECTION SUBCUTANEOUS at 09:13

## 2025-06-03 RX ADMIN — METHYLPREDNISOLONE SODIUM SUCCINATE 40 MG: 40 INJECTION, POWDER, FOR SOLUTION INTRAMUSCULAR; INTRAVENOUS at 09:13

## 2025-06-03 NOTE — PROGRESS NOTES
Progress Note - Hospitalist   Name: Kelly Lind 75 y.o. female I MRN: [id number]  Unit/Bed#: [address] [place] [address] Date of Admission: 6/1/2025   Date of Service: 6/3/2025 I Hospital Day: 2    Assessment & Plan  Dysphagia  Patient presented to ED with worsening dysphagia past few days. 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 Unable to keep down solid foods x 3 days.  CT chest: Patchy groundglass opacity and tree-in-bud nodularity in the right middle lobe and left lower lobe and mild groundglass opacity in the right lower lobe compatible with aspiration. Extensive hyperdensity in the right middle lobe and both lower lobes compatible with aspirated barium.  CT soft tissue neck: Postoperative changes of left parotidectomy and radical neck dissection with flap reconstruction. Asymmetric soft tissue thickening throughout the left neck with encasement of the left common carotid and cervical ICA. While this may represent posttreatment change, underlying recurrent neoplastic disease is not excluded. Correlation with prior imaging is recommended. Consider follow-up PET/CT imaging.   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 Asymmetric soft tissue thickening throughout the left neck with encasement of the left common carotid and cervical ICA.  While this may represent posttreatment change, underlying recurrent neoplastic disease is not excluded. Correlation with prior imaging is recommended. Consider follow-up PET/CT imaging.  Outpatient follow up with oncology  Aspiration pneumonitis (HCC)  CT chest: Patchy groundglass opacity and tree-in-bud nodularity in the right middle lobe and left lower lobe and mild groundglass opacity in the right lower lobe compatible with aspiration.  POA due to dysphagia and regurgitation of food and liquid x 3 days  Afebrile, no leukocytosis, hemodynamically stable, procal negative, no need for abx  Plan as above  Hyponatremia  Sodium 131 on admission, likely due to poor oral intake in setting of dysphagia  Currently on IVF while NPO, sodium 135 this morning  Daily bmp  Essential hypertension  Home regimen includes fosinopril and Dyazide, currently held due to dysphagia/NPO status  BP acceptable, hydralazine as needed  Resume oral medications when able  Acquired hypothyroidism  Patient normally takes levothyroxine 100 mcg daily  Received IV levothyroxine 70 mcg  x 1 dose on 6/1  Resume home levothyroxine when able  Anxiety  Patient normally is on Paxil 20 mg p.o. daily, hold currently due to dysphagia and NPO status  Supportive care    VTE Pharmacologic Prophylaxis:   Moderate Risk (Score 3-4) - Pharmacological DVT Prophylaxis Ordered: enoxaparin (Lovenox).    Mobility:   Basic Mobility Inpatient Raw Score: 24  JH-HLM Goal: 8: Walk 250 feet or more  JH-HLM Achieved: 8: Walk 250 feet ot more  JH-HLM Goal achieved. Continue to encourage appropriate mobility.    Patient Centered Rounds: I performed bedside rounds with nursing staff today.   Discussions with Specialists or Other Care Team Provider: GI, speech    Education and Discussions with Family / Patient: Patient declined call to .     Current Length of Stay: 2 day(s)  Current Patient Status: Inpatient   Certification Statement: The patient will continue to  require additional inpatient hospital stay due to dysphagia, barium swallow, possible feeding tube  Discharge Plan: Anticipate discharge in 48-72 hrs to home.    Code Status: Level 1 - Full Code    Subjective   Patient reports dysphagia is about the same as the few days before she came in. Could not tolerate clear liquids, states it came right back up. Feels no different after EGD. Denies fever, chills, CP, SOB, n/v/d, abdominal pain. Reports she is okay with having feeding tube if needed.    Objective :  Temp:  [97.6 °F (36.4 °C)-97.9 °F (36.6 °C)] 97.9 °F (36.6 °C)  HR:  [72-93] 87  BP: (132-156)/(69-79) 151/79  Resp:  [15-20] 18  SpO2:  [93 %-96 %] 96 %  O2 Device: None (Room air)    Body mass index is 20.88 kg/m².     Input and Output Summary (last 24 hours):     Intake/Output Summary (Last 24 hours) at 6/3/2025 1326  Last data filed at 6/3/2025 1300  Gross per 24 hour   Intake 150 ml   Output --   Net 150 ml       Physical Exam  Vitals and nursing note reviewed.   Constitutional:       General: She is not in acute distress.     Appearance: She is well-developed.     Cardiovascular:      Rate and Rhythm: Normal rate and regular rhythm.   Pulmonary:      Effort: Pulmonary effort is normal. No respiratory distress.      Breath sounds: Normal breath sounds.   Abdominal:      Palpations: Abdomen is soft.      Tenderness: There is no abdominal tenderness.     Musculoskeletal:         General: No swelling.     Skin:     General: Skin is warm and dry.      Capillary Refill: Capillary refill takes less than 2 seconds.     Neurological:      Mental Status: She is alert.     Psychiatric:         Mood and Affect: Mood normal.           Lines/Drains:        Lab Results: I have reviewed the following results:   Results from last 7 days   Lab Units 06/03/25  0951 06/02/25  0453 06/01/25  1122   WBC Thousand/uL 8.84   < > 6.72   HEMOGLOBIN g/dL 13.8   < > 13.9   HEMATOCRIT % 41.9   < > 42.1   PLATELETS Thousands/uL 216   < >  214   SEGS PCT %  --   --  83*   LYMPHO PCT %  --   --  9*   MONO PCT %  --   --  7   EOS PCT %  --   --  0    < > = values in this interval not displayed.     Results from last 7 days   Lab Units 06/03/25  0951 06/02/25  0453 06/01/25  1122   SODIUM mmol/L 135   < > 131*   POTASSIUM mmol/L 3.3*   < > 3.6   CHLORIDE mmol/L 96   < > 92*   CO2 mmol/L 24   < > 30   BUN mg/dL 16   < > 9   CREATININE mg/dL 0.59*   < > 0.68   ANION GAP mmol/L 15*   < > 9   CALCIUM mg/dL 9.1   < > 9.4   ALBUMIN g/dL  --   --  4.3   TOTAL BILIRUBIN mg/dL  --   --  0.67   ALK PHOS U/L  --   --  82   ALT U/L  --   --  11   AST U/L  --   --  17   GLUCOSE RANDOM mg/dL 95   < > 107    < > = values in this interval not displayed.         Results from last 7 days   Lab Units 06/03/25  0348   POC GLUCOSE mg/dl 299*         Results from last 7 days   Lab Units 06/03/25  0951   PROCALCITONIN ng/ml <0.05       Recent Cultures (last 7 days):         Imaging Results Review: I reviewed radiology reports from this admission including: CT chest and procedure reports.  Other Study Results Review: No additional pertinent studies reviewed.    Last 24 Hours Medication List:     Current Facility-Administered Medications:     acetaminophen (Ofirmev) injection 1,000 mg, Q6H PRN    [Held by provider] aspirin chewable tablet 81 mg, Daily    enoxaparin (LOVENOX) subcutaneous injection 40 mg, Daily    hydrALAZINE (APRESOLINE) injection 5 mg, Q6H PRN    [Held by provider] levothyroxine tablet 100 mcg, Daily    [Held by provider] lisinopril (ZESTRIL) tablet 40 mg, Daily    methylPREDNISolone sodium succinate (Solu-MEDROL) injection 40 mg, Daily    ondansetron (ZOFRAN) injection 4 mg, Q6H PRN    pantoprazole (PROTONIX) injection 40 mg, Q12H LULA    [Held by provider] PARoxetine (PAXIL) tablet 20 mg, Daily    sodium chloride 0.9 % infusion, Continuous    [Held by provider] triamterene-hydrochlorothiazide (MAXZIDE-25) 37.5-25 mg per tablet 1 tablet, Daily    Administrative  Statements   Today, Patient Was Seen By: Leslie Graham PA-C    **Please Note: This note may have been constructed using a voice recognition system.**

## 2025-06-03 NOTE — ASSESSMENT & PLAN NOTE
75 y.o. female with past medical history of parotid cancer s/p parotidectomy/radiation/chemotherapy, dysphagia, hypertension, hypothyroidism who presents with worsening dysphagia.  Patient reported on admission that she has had worsening dysphagia over the last 3 to 4 days at the point where she is unable to keep any food down.ENT did see patient and laryngotomy showed edematous supraglottic tissue and she was started on IV steroids and speech therapy was consulted.  Patient has history of mild oral stage dysphagia and significant pharyngeal dysphagia seen on video barium swallow 11/6/2023.      CT soft tissue neck showed edematous appearance to the supraglottic and glottic soft tissue with mild airway narrowing.  Partially visualized air-fluid level within the distal esophagus.  Multilevel cervical spondylosis with severe foraminal stenosis.   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  Dilation caused mucosal tears and improved lumen appearance; post dilation mucosal tears were superficial.  Stomach and duodenum normal  Recommend EGD with dilation as outpatient in 2 to 4 weeks  Continue pantoprazole IV twice daily  Continue IV steroids per ENT  Speech therapy following and video, modified barium swallow with speech therapy  Patient may require feeding tube for nutritional support.  Will keep n.p.o. after midnight for possible feeding tube insertion tomorrow.  Patient reports she previously had a feeding tube for 6 months in which she did bolus feedings.

## 2025-06-03 NOTE — ASSESSMENT & PLAN NOTE
Patient presented to ED with worsening dysphagia past few days. 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 Unable to keep down solid foods x 3 days.  CT chest: Patchy groundglass opacity and tree-in-bud nodularity in the right middle lobe and left lower lobe and mild groundglass opacity in the right lower lobe compatible with aspiration. Extensive hyperdensity in the right middle lobe and both lower lobes compatible with aspirated barium.  CT soft tissue neck: Postoperative changes of left parotidectomy and radical neck dissection with flap reconstruction. Asymmetric soft tissue thickening throughout the left neck with encasement of the left common carotid and cervical ICA. While this may represent posttreatment change, underlying recurrent neoplastic disease is not excluded. Correlation with prior imaging is recommended. Consider follow-up PET/CT imaging.   ENT consulted, noted edematous supraglottic tissues on exam, recommended Solumedrol and PPI  GI consulted, s/p EGD on 6/2 which revealed radiation induced stricture which was dilated  Patient continues with difficulty tolerating clear liquid diet  Speech consulted, plan for repeat barium swallow today  Pending results, patient is agreeable to feeding tube, NPO at midnight

## 2025-06-03 NOTE — PLAN OF CARE
Problem: PAIN - ADULT  Goal: Verbalizes/displays adequate comfort level or baseline comfort level  Description: Interventions:  - Encourage patient to monitor pain and request assistance  - Assess pain using appropriate pain scale  - Administer analgesics as ordered based on type and severity of pain and evaluate response  - Implement non-pharmacological measures as appropriate and evaluate response  - Consider cultural and social influences on pain and pain management  - Notify physician/advanced practitioner if interventions unsuccessful or patient reports new pain  - Educate patient/family on pain management process including their role and importance of  reporting pain   - Provide non-pharmacologic/complimentary pain relief interventions  Outcome: Progressing     Problem: INFECTION - ADULT  Goal: Absence or prevention of progression during hospitalization  Description: INTERVENTIONS:  - Assess and monitor for signs and symptoms of infection  - Monitor lab/diagnostic results  - Monitor all insertion sites, i.e. indwelling lines, tubes, and drains  - Monitor endotracheal if appropriate and nasal secretions for changes in amount and color  - [place] appropriate cooling/warming therapies per order  - Administer medications as ordered  - Instruct and encourage patient and family to use good hand hygiene technique  - Identify and instruct in appropriate isolation precautions for identified infection/condition  Outcome: Progressing  Goal: Absence of fever/infection during neutropenic period  Description: INTERVENTIONS:  - Monitor WBC  - Perform strict hand hygiene  - Limit to healthy visitors only  - No plants, dried, fresh or silk flowers with glaser in patient room  Outcome: Progressing     Problem: SAFETY ADULT  Goal: Patient will remain free of falls  Description: INTERVENTIONS:  - Educate patient/family on patient safety including physical limitations  - Instruct patient to call for assistance with activity   -  Consider consulting OT/PT to assist with strengthening/mobility based on AM PAC & JH-HLM score  - Consult OT/PT to assist with strengthening/mobility   - Keep Call bell within reach  - Keep bed low and locked with side rails adjusted as appropriate  - Keep care items and personal belongings within reach  - Initiate and maintain comfort rounds  - Make Fall Risk Sign visible to staff  - Offer Toileting every 2 Hours, in advance of need  - Initiate/Maintain bed/chair alarm  - Obtain necessary fall risk management equipment: bed/chair alarm  - Apply yellow socks and bracelet for high fall risk patients  - Consider moving patient to room near nurses station  Outcome: Progressing  Goal: Maintain or return to baseline ADL function  Description: INTERVENTIONS:  -  Assess patient's ability to carry out ADLs; assess patient's baseline for ADL function and identify physical deficits which impact ability to perform ADLs (bathing, care of mouth/teeth, toileting, grooming, dressing, etc.)  - Assess/evaluate cause of self-care deficits   - Assess range of motion  - Assess patient's mobility; develop plan if impaired  - Assess patient's need for assistive devices and provide as appropriate  - Encourage maximum independence but intervene and supervise when necessary  - Involve family in performance of ADLs  - Assess for home care needs following discharge   - Consider OT consult to assist with ADL evaluation and planning for discharge  - Provide patient education as appropriate  - Monitor functional capacity and physical performance, use of AM PAC & JH-HLM   - Monitor gait, balance and fatigue with ambulation    Outcome: Progressing  Goal: Maintains/Returns to pre admission functional level  Description: INTERVENTIONS:  - Perform AM-PAC 6 Click Basic Mobility/ Daily Activity assessment daily.  - Set and communicate daily mobility goal to care team and patient/family/caregiver.   - Collaborate with rehabilitation services on mobility  goals if consulted  - Perform Range of Motion 3 times a day.  - Reposition patient every 3 hours.  - Dangle patient 3 times a day  - Stand patient 3 times a day  - Ambulate patient 3 times a day  - Out of bed to chair 3 times a day   - Out of bed for meals 3 times a day  - Out of bed for toileting  - Record patient progress and toleration of activity level   Outcome: Progressing     Problem: DISCHARGE PLANNING  Goal: Discharge to home or other facility with appropriate resources  Description: INTERVENTIONS:  - Identify barriers to discharge w/patient and caregiver  - Arrange for needed discharge resources and transportation as appropriate  - Identify discharge learning needs (meds, wound care, etc.)  - Arrange for interpretive services to assist at discharge as needed  - Refer to Case Management Department for coordinating discharge planning if the patient needs post-hospital services based on physician/advanced practitioner order or complex needs related to functional status, cognitive ability, or social support system  Outcome: Progressing     Problem: Knowledge Deficit  Goal: Patient/family/caregiver demonstrates understanding of disease process, treatment plan, medications, and discharge instructions  Description: Complete learning assessment and assess knowledge base.  Interventions:  - Provide teaching at level of understanding  - Provide teaching via preferred learning methods  Outcome: Progressing     Problem: Nutrition/Hydration-ADULT  Goal: Nutrient/Hydration intake appropriate for improving, restoring or maintaining nutritional needs  Description: Monitor and assess patient's nutrition/hydration status for malnutrition. Collaborate with interdisciplinary team and initiate plan and interventions as ordered.  Monitor patient's weight and dietary intake as ordered or per policy. Utilize nutrition screening tool and intervene as necessary. Determine patient's food preferences and provide high-protein,  high-caloric foods as appropriate.     INTERVENTIONS:  - Monitor oral intake, urinary output, labs, and treatment plans  - Assess nutrition and hydration status and recommend course of action  - Evaluate amount of meals eaten  - Assist patient with eating if necessary   - Allow adequate time for meals  - Recommend/ encourage appropriate diets, oral nutritional supplements, and vitamin/mineral supplements  - Order, calculate, and assess calorie counts as needed  - Recommend, monitor, and adjust tube feedings and TPN/PPN based on assessed needs  - Assess need for intravenous fluids  - Provide specific nutrition/hydration education as appropriate  - Include patient/family/caregiver in decisions related to nutrition  Outcome: Progressing

## 2025-06-03 NOTE — SPEECH THERAPY NOTE
Speech Pathology Bedside Swallow Evaluation      Patient Name: Kelly Lind    Today's Date: 6/3/2025     Problem List  Principal Problem:    Dysphagia  Active Problems:    Essential hypertension    Acquired hypothyroidism    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(HCC)    Anxiety    Hyponatremia    Aspiration pneumonitis (HCC)      Past Medical History  Past Medical History[1]    Past Surgical History  Past Surgical History[2]    Summary   Pt presented with s/s suggestive of/ suspected for severe pharyngeal dysphagia.    Recommendation: NPO and MBS/VBS  Recommended Form of Meds: non-oral at this time  Other Recommendations: Continue frequent oral care        Current Medical Status  Kelly Lind is a 75 y.o. female with a PMH of parotid cancer status post parotidectomy, radiation and chemotherapy, chronic dysphagia, hypertension and hypothyroidism who presented 6/1 with  increased s/s dysphagia (to the point where she cannot keep anything down and is regurgitating everything). In the ED CT of the chest was performed showing hyperdensity right lower lobe patchy groundglass opacity and tree-in-bud nodularity, right middle lobe multiple mediastinal nodes normal in size however increased in number of uncertain etiology and significance as per radiology report.  CT of the neck/soft tissue showing postoperative changes of left parotidectomy and radical neck dissection with flap reconstruction.  Asymmetric soft tissue thickening throughout the left neck with encasement of the left common carotid and cervical ICA.  While this may represent posttreatment change underlying recurrent neoplastic disease is not excluded, correlation with prior imaging is recommended, consider follow-up PET/CT imaging.  Edematous appearance to supraglottic and glottic soft tissues with mild airway narrowing, heterogeneous appearance of thyroid gland, thyroiditis not excluded, multilevel cervical spondylosis with severe foraminal stenosis, partially visualized  air-fluid level within the distal esophagus correlate with symptoms of GERD as per radiology report. ENT and GI consulted (see their reports).  EGD completed 6/2.   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 Dilation caused mucosal tears and improved lumen appearance; post-dilation mucosal tears were superficial  The body of the stomach and antrum appeared normal.  The duodenal bulb and 2nd part of the duodenum appeared normal.  RECOMMENDATION:  Clear liquid diet and advance as tolerated  Recommend cutting meat and vegetable into small pieces  Keep your food moist  Recommend crushing large pills prior to swallowing  Take sips between the swallow  Further recommendation of speech and swallow therapy team  Repeat EGD with dilation as an outpatient in 2 to 4 weeks     SLP Swallow Evaluation process begun bedside.     Current Precautions:   Aspiration     Allergies:  No known food allergies    Past medical history:  Please see H&P for details    Special Studies:  See above for CTs of chest and neck.     Ba Swallow of 11/6/23: INCOMPLETE EXAMINATION-the examination was terminated when the patient demonstrated negrita aspiration into the trachea and tracheobronchial tree upon ingestion of the first cup of barium.   No esophageal obstruction noted.    VBS/MBS of 11/14/23:  s/s significant pharyngeal dysphagia as detailed above (1* characteristics: impaired mechanics s/p resection and RADIATION as well as presence of osteophytes with SIGNIFICANT residue with food/liquid and risk for overflow aspiration.  +Mild recurrent aspiration with thin liquids today)      Social/Education/Vocational Hx:  Pt lives alone (is active and independent).  +Supportive family and friends.     Swallow Information   Current Risks for Dysphagia & Aspiration: known history of dysphagia and aspiration  Current Diet: NPO   Baseline Diet: soft moist food, thin  "liquid      Baseline Assessment   Behavior/Cognition:fully alert & O  Speech/Language Status: able to participate in conversation  Patient Positioning: upright in bed  Pain Status/Interventions/Response to Interventions:  No report of or nonverbal indications of pain.       Swallow Mechanism Exam  Facial: symmetrical  Labial: WFL  Lingual: WFL  Mandible: adequate ROM  Dentition: adequate  Vocal quality:dysphonic/ at least mildly hoarse and nasal  Respiratory Status: on RA       Consistencies Assessed and Performance   Materials administered included 2 small tsps applesauce, 1 of Honey and 1 of nectar thick liquid    Oral Stage: WFL appearing with min administered    Pharyngeal Stage: severe, suspected pharyngeal residue, multiple swallows, effortful swallow, audible swallows, and delayed cough with all materials    Phayngo-Esophageal Concerns: \"stricture/narrowing\"-->is s/p dilation    Strategies and Efficacy: pt used strategies proven to be most beneficial on previous VBS (small bolus, L head turn/tuck, SUCCESSIVE effortful swallows, plus cough /throat clearing    Summary and Recommendations (see above)    Results Reviewed with: patient, RN, and CRNP     Recommended: VBS/MBS    Short Term Goals:  -Patient will comply with a Video/Modified Barium Swallow study for more complete assessment of swallowing anatomy/physiology/aspiration risk and to assess efficacy of treatment techniques so as to best guide treatment plan    Additional goals to follow.    Joanie Barber MS,SLP  [place] License [id number]  [place] License [id number]         [1]   Past Medical History:  Diagnosis Date    Cancer (HCC)     Dysphagia     Goiter    [2]   Past Surgical History:  Procedure Laterality Date    COLONOSCOPY      HERNIA REPAIR      HIP SURGERY Bilateral     HIP SURGERY Bilateral     HYSTERECTOMY      PAROTIDECTOMY      REPLACEMENT TOTAL KNEE BILATERAL Left     SKIN BIOPSY      UPPER GASTROINTESTINAL ENDOSCOPY       "

## 2025-06-03 NOTE — PROGRESS NOTES
Progress Note - Gastroenterology   Name: Kelly Lind 75 y.o. female I MRN: [id number]  Unit/Bed#: [address] [place] [address] I Date of Admission: 6/1/2025   Date of Service: 6/3/2025 I Hospital Day: 2    Assessment & Plan  Dysphagia  75 y.o. female with past medical history of parotid cancer s/p parotidectomy/radiation/chemotherapy, dysphagia, hypertension, hypothyroidism who presents with worsening dysphagia.  Patient reported on admission that she has had worsening dysphagia over the last 3 to 4 days at the point where she is unable to keep any food down.ENT did see patient and laryngotomy showed edematous supraglottic tissue and she was started on IV steroids and speech therapy was consulted.  Patient has history of mild oral stage dysphagia and significant pharyngeal dysphagia seen on video barium swallow 11/6/2023.      CT soft tissue neck showed edematous appearance to the supraglottic and glottic soft tissue with mild airway narrowing.  Partially visualized air-fluid level within the distal esophagus.  Multilevel cervical spondylosis with severe foraminal stenosis.   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  Dilation caused mucosal tears and improved lumen appearance; post dilation mucosal tears were superficial.  Stomach and duodenum normal  Recommend EGD with dilation as outpatient in 2 to 4 weeks  Continue pantoprazole IV twice daily  Continue IV steroids per ENT  Speech therapy following and video, modified barium swallow with speech therapy  Patient may require feeding tube for nutritional support.  Will keep n.p.o. after midnight for possible feeding tube insertion tomorrow.  Patient reports she previously had a feeding tube for 6 months in which she did bolus feedings.            Subjective   Lying in bed in no acute distress.  Patient still unable to swallow and tolerate any oral intake.  Speech therapy did evaluate patient at  bedside this morning and video barium swallow with speech therapy ordered for further evaluation of swallowing function    Objective :  Temp:  [97.6 °F (36.4 °C)-97.9 °F (36.6 °C)] 97.9 °F (36.6 °C)  HR:  [72-93] 87  BP: (132-156)/(69-79) 151/79  Resp:  [15-20] 18  SpO2:  [93 %-96 %] 96 %  O2 Device: None (Room air)    Physical Exam  Vitals and nursing note reviewed.   Constitutional:       General: She is not in acute distress.     Appearance: She is well-developed.   HENT:      Head: Normocephalic and atraumatic.     Eyes:      Conjunctiva/sclera: Conjunctivae normal.       Cardiovascular:      Rate and Rhythm: Normal rate and regular rhythm.      Pulses: Normal pulses.      Heart sounds: Normal heart sounds. No murmur heard.  Pulmonary:      Effort: Pulmonary effort is normal. No respiratory distress.      Breath sounds: Normal breath sounds. No stridor. No wheezing, rhonchi or rales.   Abdominal:      General: Bowel sounds are normal. There is no distension.      Palpations: Abdomen is soft. There is no mass.      Tenderness: There is no abdominal tenderness. There is no guarding or rebound.     Musculoskeletal:         General: No swelling.      Cervical back: Neck supple.      Right lower leg: No edema.      Left lower leg: No edema.     Skin:     General: Skin is warm and dry.      Capillary Refill: Capillary refill takes less than 2 seconds.      Coloration: Skin is not jaundiced or pale.     Neurological:      Mental Status: She is alert and oriented to person, place, and time.     Psychiatric:         Mood and Affect: Mood normal.         Lab Results: I have reviewed the following results:CBC/BMP: No new results in last 24 hours.     Imaging Results Review: I reviewed radiology reports from this admission including: CT soft tissue neck with contrast and CT chest.

## 2025-06-03 NOTE — ASSESSMENT & PLAN NOTE
Patient normally takes levothyroxine 100 mcg daily  Received IV levothyroxine 70 mcg  x 1 dose on 6/1  Resume home levothyroxine when able

## 2025-06-03 NOTE — PROCEDURES
Speech Pathology Videofluoroscopic Swallow Study (VFSS/VBSS/MBSS)      Patient Name: Kelly Lind    Today's Date: 6/3/2025     Problem List  Principal Problem:    Dysphagia  Active Problems:    Essential hypertension    Acquired hypothyroidism    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(HCC)    Anxiety    Hyponatremia    Aspiration pneumonitis (HCC)      Past Medical History  Past Medical History[1]    General Information;  See EMR including MD notes and Bedside Swallow evaluation report for hx.  A VFSS was recommended to assess oropharyngeal stage swallowing skills at this time. Pt was viewed in lateral position and assessed with 1 tsp of nectar thick liquid and 1 tsp of puree. No additional materials were administered secondary to severity of dysphagia.     Oral stage:    Lip closure:  no escape  Bolus Transport/Lingual Motion: slowed repetitive tongue motion  Tongue Control: posterior escape of ~half of the bolus with nectar thick liquid  Swallow Initiation:  bolus head in pyriforms with nectar thick liquid    Pharyngeal stage:  severe/profound pharyngeal dysphagia. Visualization was challenging but disc osteophyte complex noted    Soft palate elevation: h/o nasal regurgitation per pt (none on 2 v small boluses today)  Laryngeal elevation: minimal  Anterior hyoid excursion: unable to visualize  Epiglottic movement:  no movement  Laryngeal vestibule closure:  poor  Tongue base retraction: POOR (nearly absent)  Pharyngeal Stripping: appeared POOR (nearly absent)   PES opening:  minimal to no distention or duration with marked obstruction of flow    Pharyngeal Residue:  -all puree was retained in vallecula (despite multiple attempts at successive effortful swallows, head turn L).  Aggressive throat clear and expectoration raised retained puree  -Severe vallecular and pyriform retention with 3 cc of nectar thick liquid with significant overflow aspiration    Penetration/Aspiration:  Thin: PAS -did not administer  Nectar:  "PAS-7  Puree: high risk for overflow over time  Response to Aspiration: mildly delayed cough which does not clear all aspirated materials           8-Point Penetration-Aspiration Scale   1 Material does not enter the airway   2 Material enters the airway, remains above the vocal folds, and is ejected  from the  airway    3 Material enters the airway, remains above the vocal folds, and is not ejected from the airway   4 Material enters the airway, contacts the vocal folds, and is ejected from the airway   5 Material enters the airway, contacts the vocal folds, and is not ejected from the airway    6 Material enters the airway, passes below the vocal folds and is ejected into the larynx or out of the airway    7 Material enters the airway, passes below the vocal folds, and is not ejected from the trachea despite effort    8 Material enters the airway, passes below the vocal folds, and no effort is made to eject         Strategies and Efficacy: small bolus, breath hold/effortful successive swallowsand head turn did not increase swallow safety/efficiency today     Esophageal stage: unable to assess (no significant material passed into esophagus)    Assessment Summary:    Pt presents with severe pharyngeal dysphagia characterized by delayed swallow initiation, poor airway rise and closure, poor/nearly absent base of tongue retraction and pharyngeal stripping with retention of all puree in valleculae, nectar liquids spilled more deeply then were retained and overflowed into airway (significant amt of bolus).  Also suspect significant osteophytic disc complex.   Pt also reports some changes in vocal quality (\"bit hoarse and lower pitch as well as slight change in nasality..hypernasality)  Note: Images are available for review in [facility]/PACS as desired.    NOMS (National Outcome Measurement System): Swallowing \"Score\")    LEVEL 1: Individual is not able to swallow anything safely by mouth. All nutrition and hydration are " received through non-oral means (e.g., nasogastric tube, PEG).     Functional Oral Intake Scale (FOIS)  (Does not include liquids)  Nothing by mouth (NPO).    Recommendations:   Recommended:  NPO and PEG tube insertion at this time  Recommended Form of Medications: non-oral     Results Reviewed with: patient     Continue w/u as indicated and agree with PEG tube insertion.   Consider OP SLP Dysphagia tx as indicated after w/u and medical optimization    Joanie Barber MS,SLP  [place] License [id number]                [1]   Past Medical History:  Diagnosis Date    Cancer (HCC)     Dysphagia     Goiter

## 2025-06-03 NOTE — ASSESSMENT & PLAN NOTE
Sodium 131 on admission, likely due to poor oral intake in setting of dysphagia  Currently on IVF while NPO, sodium 135 this morning  Daily bmp

## 2025-06-03 NOTE — NURSING NOTE
The patient was not check for glucose, the writer scan the wrong patient's id information.  299 was the glucose reading for room 409

## 2025-06-03 NOTE — ASSESSMENT & PLAN NOTE
CT chest: Patchy groundglass opacity and tree-in-bud nodularity in the right middle lobe and left lower lobe and mild groundglass opacity in the right lower lobe compatible with aspiration.  POA due to dysphagia and regurgitation of food and liquid x 3 days  Afebrile, no leukocytosis, hemodynamically stable, procal negative, no need for abx  Plan as above

## 2025-06-03 NOTE — PLAN OF CARE
Problem: PAIN - ADULT  Goal: Verbalizes/displays adequate comfort level or baseline comfort level  Description: Interventions:  - Encourage patient to monitor pain and request assistance  - Assess pain using appropriate pain scale  - Administer analgesics as ordered based on type and severity of pain and evaluate response  - Implement non-pharmacological measures as appropriate and evaluate response  - Consider cultural and social influences on pain and pain management  - Notify physician/advanced practitioner if interventions unsuccessful or patient reports new pain  - Educate patient/family on pain management process including their role and importance of  reporting pain   - Provide non-pharmacologic/complimentary pain relief interventions  Outcome: Progressing     Problem: INFECTION - ADULT  Goal: Absence or prevention of progression during hospitalization  Description: INTERVENTIONS:  - Assess and monitor for signs and symptoms of infection  - Monitor lab/diagnostic results  - Monitor all insertion sites, i.e. indwelling lines, tubes, and drains  - Monitor endotracheal if appropriate and nasal secretions for changes in amount and color  - [place] appropriate cooling/warming therapies per order  - Administer medications as ordered  - Instruct and encourage patient and family to use good hand hygiene technique  - Identify and instruct in appropriate isolation precautions for identified infection/condition  Outcome: Progressing  Goal: Absence of fever/infection during neutropenic period  Description: INTERVENTIONS:  - Monitor WBC  - Perform strict hand hygiene  - Limit to healthy visitors only  - No plants, dried, fresh or silk flowers with glaser in patient room  Outcome: Progressing     Problem: SAFETY ADULT  Goal: Patient will remain free of falls  Description: INTERVENTIONS:  - Educate patient/family on patient safety including physical limitations  - Instruct patient to call for assistance with activity   -  Consider consulting OT/PT to assist with strengthening/mobility based on AM PAC & JH-HLM score  - Consult OT/PT to assist with strengthening/mobility   - Keep Call bell within reach  - Keep bed low and locked with side rails adjusted as appropriate  - Keep care items and personal belongings within reach  - Initiate and maintain comfort rounds  - Make Fall Risk Sign visible to staff  - Offer Toileting every 2 Hours, in advance of need  - Initiate/Maintain bed/chair alarm  - Obtain necessary fall risk management equipment: bed/chair alarm  - Apply yellow socks and bracelet for high fall risk patients  - Consider moving patient to room near nurses station  Outcome: Progressing  Goal: Maintain or return to baseline ADL function  Description: INTERVENTIONS:  -  Assess patient's ability to carry out ADLs; assess patient's baseline for ADL function and identify physical deficits which impact ability to perform ADLs (bathing, care of mouth/teeth, toileting, grooming, dressing, etc.)  - Assess/evaluate cause of self-care deficits   - Assess range of motion  - Assess patient's mobility; develop plan if impaired  - Assess patient's need for assistive devices and provide as appropriate  - Encourage maximum independence but intervene and supervise when necessary  - Involve family in performance of ADLs  - Assess for home care needs following discharge   - Consider OT consult to assist with ADL evaluation and planning for discharge  - Provide patient education as appropriate  - Monitor functional capacity and physical performance, use of AM PAC & JH-HLM   - Monitor gait, balance and fatigue with ambulation    Outcome: Progressing  Goal: Maintains/Returns to pre admission functional level  Description: INTERVENTIONS:  - Perform AM-PAC 6 Click Basic Mobility/ Daily Activity assessment daily.  - Set and communicate daily mobility goal to care team and patient/family/caregiver.   - Collaborate with rehabilitation services on mobility  goals if consulted  - Perform Range of Motion 3 times a day.  - Reposition patient every 3 hours.  - Dangle patient 3 times a day  - Stand patient 3 times a day  - Ambulate patient 3 times a day  - Out of bed to chair 3 times a day   - Out of bed for meals 3 times a day  - Out of bed for toileting  - Record patient progress and toleration of activity level   Outcome: Progressing     Problem: DISCHARGE PLANNING  Goal: Discharge to home or other facility with appropriate resources  Description: INTERVENTIONS:  - Identify barriers to discharge w/patient and caregiver  - Arrange for needed discharge resources and transportation as appropriate  - Identify discharge learning needs (meds, wound care, etc.)  - Arrange for interpretive services to assist at discharge as needed  - Refer to Case Management Department for coordinating discharge planning if the patient needs post-hospital services based on physician/advanced practitioner order or complex needs related to functional status, cognitive ability, or social support system  Outcome: Progressing     Problem: Knowledge Deficit  Goal: Patient/family/caregiver demonstrates understanding of disease process, treatment plan, medications, and discharge instructions  Description: Complete learning assessment and assess knowledge base.  Interventions:  - Provide teaching at level of understanding  - Provide teaching via preferred learning methods  Outcome: Progressing     Problem: Nutrition/Hydration-ADULT  Goal: Nutrient/Hydration intake appropriate for improving, restoring or maintaining nutritional needs  Description: Monitor and assess patient's nutrition/hydration status for malnutrition. Collaborate with interdisciplinary team and initiate plan and interventions as ordered.  Monitor patient's weight and dietary intake as ordered or per policy. Utilize nutrition screening tool and intervene as necessary. Determine patient's food preferences and provide high-protein,  high-caloric foods as appropriate.     INTERVENTIONS:  - Monitor oral intake, urinary output, labs, and treatment plans  - Assess nutrition and hydration status and recommend course of action  - Evaluate amount of meals eaten  - Assist patient with eating if necessary   - Allow adequate time for meals  - Recommend/ encourage appropriate diets, oral nutritional supplements, and vitamin/mineral supplements  - Order, calculate, and assess calorie counts as needed  - Recommend, monitor, and adjust tube feedings and TPN/PPN based on assessed needs  - Assess need for intravenous fluids  - Provide specific nutrition/hydration education as appropriate  - Include patient/family/caregiver in decisions related to nutrition  Outcome: Progressing

## 2025-06-04 ENCOUNTER — ANESTHESIA EVENT (INPATIENT)
Dept: PERIOP | Facility: HOSPITAL | Age: 75
DRG: 391 | End: 2025-06-04
Payer: MEDICARE

## 2025-06-04 ENCOUNTER — APPOINTMENT (INPATIENT)
Dept: PERIOP | Facility: HOSPITAL | Age: 75
DRG: 391 | End: 2025-06-04
Attending: NURSE PRACTITIONER
Payer: MEDICARE

## 2025-06-04 ENCOUNTER — ANESTHESIA (INPATIENT)
Dept: PERIOP | Facility: HOSPITAL | Age: 75
DRG: 391 | End: 2025-06-04
Payer: MEDICARE

## 2025-06-04 LAB
ANION GAP SERPL CALCULATED.3IONS-SCNC: 9 MMOL/L (ref 4–13)
BUN SERPL-MCNC: 19 MG/DL (ref 5–25)
CALCIUM SERPL-MCNC: 8.9 MG/DL (ref 8.4–10.2)
CHLORIDE SERPL-SCNC: 100 MMOL/L (ref 96–108)
CO2 SERPL-SCNC: 30 MMOL/L (ref 21–32)
CREAT SERPL-MCNC: 0.56 MG/DL (ref 0.6–1.3)
ERYTHROCYTE [DISTWIDTH] IN BLOOD BY AUTOMATED COUNT: 13.1 % (ref 11.6–15.1)
GFR SERPL CREATININE-BSD FRML MDRD: 92 ML/MIN/1.73SQ M
GLUCOSE SERPL-MCNC: 81 MG/DL (ref 65–140)
HCT VFR BLD AUTO: 38 % (ref 34.8–46.1)
HGB BLD-MCNC: 12.6 G/DL (ref 11.5–15.4)
MCH RBC QN AUTO: 29.9 PG (ref 26.8–34.3)
MCHC RBC AUTO-ENTMCNC: 33.2 G/DL (ref 31.4–37.4)
MCV RBC AUTO: 90 FL (ref 82–98)
PLATELET # BLD AUTO: 200 THOUSANDS/UL (ref 149–390)
PMV BLD AUTO: 9.6 FL (ref 8.9–12.7)
POTASSIUM SERPL-SCNC: 3.6 MMOL/L (ref 3.5–5.3)
RBC # BLD AUTO: 4.22 MILLION/UL (ref 3.81–5.12)
SODIUM SERPL-SCNC: 139 MMOL/L (ref 135–147)
WBC # BLD AUTO: 6.38 THOUSAND/UL (ref 4.31–10.16)

## 2025-06-04 PROCEDURE — 0DH68UZ INSERTION OF FEEDING DEVICE INTO STOMACH, VIA NATURAL OR ARTIFICIAL OPENING ENDOSCOPIC: ICD-10-PCS | Performed by: INTERNAL MEDICINE

## 2025-06-04 PROCEDURE — 99232 SBSQ HOSP IP/OBS MODERATE 35: CPT | Performed by: NURSE PRACTITIONER

## 2025-06-04 PROCEDURE — 43246 EGD PLACE GASTROSTOMY TUBE: CPT | Performed by: INTERNAL MEDICINE

## 2025-06-04 PROCEDURE — 80048 BASIC METABOLIC PNL TOTAL CA: CPT

## 2025-06-04 PROCEDURE — 85027 COMPLETE CBC AUTOMATED: CPT

## 2025-06-04 RX ORDER — TRIAMCINOLONE ACETONIDE 40 MG/ML
40 INJECTION, SUSPENSION INTRA-ARTICULAR; INTRAMUSCULAR ONCE
Status: DISCONTINUED | OUTPATIENT
Start: 2025-06-04 | End: 2025-06-05 | Stop reason: HOSPADM

## 2025-06-04 RX ORDER — CEFAZOLIN SODIUM 1 G/50ML
1000 SOLUTION INTRAVENOUS EVERY 8 HOURS
Status: DISCONTINUED | OUTPATIENT
Start: 2025-06-04 | End: 2025-06-05 | Stop reason: HOSPADM

## 2025-06-04 RX ORDER — PAROXETINE 20 MG/1
20 TABLET, FILM COATED ORAL DAILY
Status: DISCONTINUED | OUTPATIENT
Start: 2025-06-04 | End: 2025-06-05 | Stop reason: HOSPADM

## 2025-06-04 RX ORDER — LIDOCAINE HYDROCHLORIDE 10 MG/ML
INJECTION, SOLUTION EPIDURAL; INFILTRATION; INTRACAUDAL; PERINEURAL AS NEEDED
Status: DISCONTINUED | OUTPATIENT
Start: 2025-06-04 | End: 2025-06-04

## 2025-06-04 RX ORDER — CEFAZOLIN SODIUM 1 G/3ML
INJECTION, POWDER, FOR SOLUTION INTRAMUSCULAR; INTRAVENOUS AS NEEDED
Status: DISCONTINUED | OUTPATIENT
Start: 2025-06-04 | End: 2025-06-04

## 2025-06-04 RX ORDER — PROPOFOL 10 MG/ML
INJECTION, EMULSION INTRAVENOUS AS NEEDED
Status: DISCONTINUED | OUTPATIENT
Start: 2025-06-04 | End: 2025-06-04

## 2025-06-04 RX ORDER — SODIUM CHLORIDE, SODIUM LACTATE, POTASSIUM CHLORIDE, CALCIUM CHLORIDE 600; 310; 30; 20 MG/100ML; MG/100ML; MG/100ML; MG/100ML
INJECTION, SOLUTION INTRAVENOUS CONTINUOUS PRN
Status: DISCONTINUED | OUTPATIENT
Start: 2025-06-04 | End: 2025-06-04

## 2025-06-04 RX ORDER — TRIAMTERENE AND HYDROCHLOROTHIAZIDE 37.5; 25 MG/1; MG/1
1 TABLET ORAL DAILY
Status: DISCONTINUED | OUTPATIENT
Start: 2025-06-04 | End: 2025-06-05 | Stop reason: HOSPADM

## 2025-06-04 RX ORDER — LISINOPRIL 20 MG/1
40 TABLET ORAL DAILY
Status: DISCONTINUED | OUTPATIENT
Start: 2025-06-04 | End: 2025-06-05 | Stop reason: HOSPADM

## 2025-06-04 RX ORDER — LEVOTHYROXINE SODIUM 100 UG/1
100 TABLET ORAL
Status: DISCONTINUED | OUTPATIENT
Start: 2025-06-05 | End: 2025-06-05 | Stop reason: HOSPADM

## 2025-06-04 RX ORDER — ESMOLOL HYDROCHLORIDE 10 MG/ML
INJECTION INTRAVENOUS AS NEEDED
Status: DISCONTINUED | OUTPATIENT
Start: 2025-06-04 | End: 2025-06-04

## 2025-06-04 RX ORDER — SODIUM CHLORIDE, SODIUM LACTATE, POTASSIUM CHLORIDE, CALCIUM CHLORIDE 600; 310; 30; 20 MG/100ML; MG/100ML; MG/100ML; MG/100ML
125 INJECTION, SOLUTION INTRAVENOUS CONTINUOUS
Status: DISCONTINUED | OUTPATIENT
Start: 2025-06-04 | End: 2025-06-04

## 2025-06-04 RX ADMIN — PROPOFOL 50 MG: 10 INJECTION, EMULSION INTRAVENOUS at 15:23

## 2025-06-04 RX ADMIN — SODIUM CHLORIDE 75 ML/HR: 0.9 INJECTION, SOLUTION INTRAVENOUS at 02:03

## 2025-06-04 RX ADMIN — PANTOPRAZOLE SODIUM 40 MG: 40 INJECTION, POWDER, FOR SOLUTION INTRAVENOUS at 08:27

## 2025-06-04 RX ADMIN — SODIUM CHLORIDE 75 ML/HR: 0.9 INJECTION, SOLUTION INTRAVENOUS at 19:42

## 2025-06-04 RX ADMIN — ENOXAPARIN SODIUM 40 MG: 40 INJECTION SUBCUTANEOUS at 08:26

## 2025-06-04 RX ADMIN — PROPOFOL 150 MG: 10 INJECTION, EMULSION INTRAVENOUS at 15:16

## 2025-06-04 RX ADMIN — ESMOLOL HYDROCHLORIDE 30 MG: 10 INJECTION, SOLUTION INTRAVENOUS at 16:04

## 2025-06-04 RX ADMIN — PROPOFOL 50 MG: 10 INJECTION, EMULSION INTRAVENOUS at 15:26

## 2025-06-04 RX ADMIN — LIDOCAINE HYDROCHLORIDE 5 ML: 10 INJECTION, SOLUTION EPIDURAL; INFILTRATION; INTRACAUDAL; PERINEURAL at 15:16

## 2025-06-04 RX ADMIN — CEFAZOLIN SODIUM 1000 MG: 1 SOLUTION INTRAVENOUS at 22:15

## 2025-06-04 RX ADMIN — TRIAMTERENE AND HYDROCHLOROTHIAZIDE 1 TABLET: 37.5; 25 TABLET ORAL at 18:46

## 2025-06-04 RX ADMIN — SODIUM CHLORIDE, SODIUM LACTATE, POTASSIUM CHLORIDE, AND CALCIUM CHLORIDE 125 ML/HR: .6; .31; .03; .02 INJECTION, SOLUTION INTRAVENOUS at 14:10

## 2025-06-04 RX ADMIN — PANTOPRAZOLE SODIUM 40 MG: 40 INJECTION, POWDER, FOR SOLUTION INTRAVENOUS at 21:16

## 2025-06-04 RX ADMIN — CEFAZOLIN 1000 MG: 1 INJECTION, POWDER, FOR SOLUTION INTRAVENOUS at 15:10

## 2025-06-04 RX ADMIN — SODIUM CHLORIDE, SODIUM LACTATE, POTASSIUM CHLORIDE, AND CALCIUM CHLORIDE: .6; .31; .03; .02 INJECTION, SOLUTION INTRAVENOUS at 15:08

## 2025-06-04 RX ADMIN — LISINOPRIL 40 MG: 20 TABLET ORAL at 18:46

## 2025-06-04 RX ADMIN — PAROXETINE 20 MG: 20 TABLET, FILM COATED ORAL at 18:47

## 2025-06-04 RX ADMIN — METHYLPREDNISOLONE SODIUM SUCCINATE 40 MG: 40 INJECTION, POWDER, FOR SOLUTION INTRAMUSCULAR; INTRAVENOUS at 08:26

## 2025-06-04 NOTE — PLAN OF CARE
Problem: PAIN - ADULT  Goal: Verbalizes/displays adequate comfort level or baseline comfort level  Description: Interventions:  - Encourage patient to monitor pain and request assistance  - Assess pain using appropriate pain scale  - Administer analgesics as ordered based on type and severity of pain and evaluate response  - Implement non-pharmacological measures as appropriate and evaluate response  - Consider cultural and social influences on pain and pain management  - Notify physician/advanced practitioner if interventions unsuccessful or patient reports new pain  - Educate patient/family on pain management process including their role and importance of  reporting pain   - Provide non-pharmacologic/complimentary pain relief interventions  Outcome: Progressing     Problem: INFECTION - ADULT  Goal: Absence or prevention of progression during hospitalization  Description: INTERVENTIONS:  - Assess and monitor for signs and symptoms of infection  - Monitor lab/diagnostic results  - Monitor all insertion sites, i.e. indwelling lines, tubes, and drains  - Monitor endotracheal if appropriate and nasal secretions for changes in amount and color  - [place] appropriate cooling/warming therapies per order  - Administer medications as ordered  - Instruct and encourage patient and family to use good hand hygiene technique  - Identify and instruct in appropriate isolation precautions for identified infection/condition  Outcome: Progressing  Goal: Absence of fever/infection during neutropenic period  Description: INTERVENTIONS:  - Monitor WBC  - Perform strict hand hygiene  Outcome: Progressing     Problem: INFECTION - ADULT  Goal: Absence or prevention of progression during hospitalization  Description: INTERVENTIONS:  - Assess and monitor for signs and symptoms of infection  - Monitor lab/diagnostic results  - Monitor all insertion sites, i.e. indwelling lines, tubes, and drains  - Monitor endotracheal if appropriate and  nasal secretions for changes in amount and color  - [place] appropriate cooling/warming therapies per order  - Administer medications as ordered  - Instruct and encourage patient and family to use good hand hygiene technique  - Identify and instruct in appropriate isolation precautions for identified infection/condition  Outcome: Progressing  Goal: Absence of fever/infection during neutropenic period  Description: INTERVENTIONS:  - Monitor WBC  - Perform strict hand hygiene  Outcome: Progressing     Problem: SAFETY ADULT  Goal: Patient will remain free of falls  Description: INTERVENTIONS:  - Educate patient/family on patient safety including physical limitations  - Instruct patient to call for assistance with activity   - Consider consulting OT/PT to assist with strengthening/mobility based on AM PAC & -HLM score  - Consult OT/PT to assist with strengthening/mobility   - Keep Call bell within reach  - Keep bed low and locked with side rails adjusted as appropriate  - Keep care items and personal belongings within reach  - Initiate and maintain comfort rounds  - Make Fall Risk Sign visible to staff  - Offer Toileting every 2 Hours, in advance of need  - Initiate/Maintain bed/chair alarm  - Obtain necessary fall risk management equipment: bed/chair alarm  - Apply yellow socks and bracelet for high fall risk patients  - Consider moving patient to room near nurses station  Outcome: Progressing  Goal: Maintain or return to baseline ADL function  Description: INTERVENTIONS:  -  Assess patient's ability to carry out ADLs; assess patient's baseline for ADL function and identify physical deficits which impact ability to perform ADLs (bathing, care of mouth/teeth, toileting, grooming, dressing, etc.)  - Assess/evaluate cause of self-care deficits   - Assess range of motion  - Assess patient's mobility; develop plan if impaired  - Assess patient's need for assistive devices and provide as appropriate  - Encourage maximum  independence but intervene and supervise when necessary  - Involve family in performance of ADLs  - Assess for home care needs following discharge   - Consider OT consult to assist with ADL evaluation and planning for discharge  - Provide patient education as appropriate  - Monitor functional capacity and physical performance, use of AM PAC & JH-HLM   - Monitor gait, balance and fatigue with ambulation    Outcome: Progressing  Goal: Maintains/Returns to pre admission functional level  Description: INTERVENTIONS:  - Perform AM-PAC 6 Click Basic Mobility/ Daily Activity assessment daily.  - Set and communicate daily mobility goal to care team and patient/family/caregiver.   - Collaborate with rehabilitation services on mobility goals if consulted  - Perform Range of Motion 3 times a day.  - Reposition patient every 3 hours.  - Dangle patient 3 times a day  - Stand patient 3 times a day  - Ambulate patient 3 times a day  - Out of bed to chair 3 times a day   - Out of bed for meals 3 times a day  - Out of bed for toileting  - Record patient progress and toleration of activity level   Outcome: Progressing     Problem: DISCHARGE PLANNING  Goal: Discharge to home or other facility with appropriate resources  Description: INTERVENTIONS:  - Identify barriers to discharge w/patient and caregiver  - Arrange for needed discharge resources and transportation as appropriate  - Identify discharge learning needs (meds, wound care, etc.)  - Arrange for interpretive services to assist at discharge as needed  - Refer to Case Management Department for coordinating discharge planning if the patient needs post-hospital services based on physician/advanced practitioner order or complex needs related to functional status, cognitive ability, or social support system  Outcome: Progressing     Problem: Knowledge Deficit  Goal: Patient/family/caregiver demonstrates understanding of disease process, treatment plan, medications, and discharge  instructions  Description: Complete learning assessment and assess knowledge base.  Interventions:  - Provide teaching at level of understanding  - Provide teaching via preferred learning methods  Outcome: Progressing     Problem: Nutrition/Hydration-ADULT  Goal: Nutrient/Hydration intake appropriate for improving, restoring or maintaining nutritional needs  Description: Monitor and assess patient's nutrition/hydration status for malnutrition. Collaborate with interdisciplinary team and initiate plan and interventions as ordered.  Monitor patient's weight and dietary intake as ordered or per policy. Utilize nutrition screening tool and intervene as necessary. Determine patient's food preferences and provide high-protein, high-caloric foods as appropriate.     INTERVENTIONS:  - Monitor oral intake, urinary output, labs, and treatment plans  - Assess nutrition and hydration status and recommend course of action  - Evaluate amount of meals eaten  - Assist patient with eating if necessary   - Allow adequate time for meals  - Recommend/ encourage appropriate diets, oral nutritional supplements, and vitamin/mineral supplements  - Order, calculate, and assess calorie counts as needed  - Assess need for intravenous fluids  - Provide specific nutrition/hydration education as appropriate  - Include patient/family/caregiver in decisions related to nutrition  Outcome: Progressing     Problem: Potential for Falls  Goal: Patient will remain free of falls  Description: INTERVENTIONS:  - Educate patient/family on patient safety including physical limitations  - Instruct patient to call for assistance with activity   - Consider consulting OT/PT to assist with strengthening/mobility based on AM PAC & JH-HLM score  - Consult OT/PT to assist with strengthening/mobility   - Keep Call bell within reach  - Keep bed low and locked with side rails adjusted as appropriate  - Keep care items and personal belongings within reach  - Initiate  and maintain comfort rounds  - Make Fall Risk Sign visible to staff  - Offer Toileting every 2 Hours, in advance of need  - Initiate/Maintain bed/chair alarm  - Obtain necessary fall risk management equipment: bed/chair alarm  - Apply yellow socks and bracelet for high fall risk patients  - Consider moving patient to room near nurses station  Outcome: Progressing

## 2025-06-04 NOTE — ASSESSMENT & PLAN NOTE
CT chest: Patchy groundglass opacity and tree-in-bud nodularity in the right middle lobe and left lower lobe and mild groundglass opacity in the right lower lobe compatible with aspiration.  POA due to dysphagia and regurgitation of food and liquid x 3 days PTA  Afebrile, no leukocytosis, hemodynamically stable, procal negative, no need for abx  Plan as above

## 2025-06-04 NOTE — PLAN OF CARE
Problem: SAFETY ADULT  Goal: Patient will remain free of falls  Description: INTERVENTIONS:  - Educate patient/family on patient safety including physical limitations  - Instruct patient to call for assistance with activity   - Consider consulting OT/PT to assist with strengthening/mobility based on AM PAC & JH-HLM score  - Consult OT/PT to assist with strengthening/mobility   - Keep Call bell within reach  - Keep bed low and locked with side rails adjusted as appropriate  - Keep care items and personal belongings within reach  - Initiate and maintain comfort rounds  - Make Fall Risk Sign visible to staff  - Offer Toileting every 2 Hours, in advance of need  - Initiate/Maintain bed/chair alarm  - Obtain necessary fall risk management equipment: bed/chair alarm  - Apply yellow socks and bracelet for high fall risk patients  - Consider moving patient to room near nurses station  6/4/2025 0011 by Adelaida Gamble RN  Outcome: Progressing  6/4/2025 0009 by Adelaida Gamble RN  Outcome: Progressing  Goal: Maintain or return to baseline ADL function  Description: INTERVENTIONS:  -  Assess patient's ability to carry out ADLs; assess patient's baseline for ADL function and identify physical deficits which impact ability to perform ADLs (bathing, care of mouth/teeth, toileting, grooming, dressing, etc.)  - Assess/evaluate cause of self-care deficits   - Assess range of motion  - Assess patient's mobility; develop plan if impaired  - Assess patient's need for assistive devices and provide as appropriate  - Encourage maximum independence but intervene and supervise when necessary  - Involve family in performance of ADLs  - Assess for home care needs following discharge   - Consider OT consult to assist with ADL evaluation and planning for discharge  - Provide patient education as appropriate  - Monitor functional capacity and physical performance, use of AM PAC & JH-HLM   - Monitor gait, balance and fatigue with  ambulation    6/4/2025 0011 by Adelaida Gamble RN  Outcome: Progressing  6/4/2025 0009 by Adelaida Gamble RN  Outcome: Progressing  Goal: Maintains/Returns to pre admission functional level  Description: INTERVENTIONS:  - Perform AM-PAC 6 Click Basic Mobility/ Daily Activity assessment daily.  - Set and communicate daily mobility goal to care team and patient/family/caregiver.   - Collaborate with rehabilitation services on mobility goals if consulted  - Perform Range of Motion 3 times a day.  - Reposition patient every 3 hours.  - Dangle patient 3 times a day  - Stand patient 3 times a day  - Ambulate patient 3 times a day  - Out of bed to chair 3 times a day   - Out of bed for meals 3 times a day  - Out of bed for toileting  - Record patient progress and toleration of activity level   6/4/2025 0011 by Adelaida Gamble RN  Outcome: Progressing  6/4/2025 0009 by Adelaida Gamble RN  Outcome: Progressing     Problem: DISCHARGE PLANNING  Goal: Discharge to home or other facility with appropriate resources  Description: INTERVENTIONS:  - Identify barriers to discharge w/patient and caregiver  - Arrange for needed discharge resources and transportation as appropriate  - Identify discharge learning needs (meds, wound care, etc.)  - Arrange for interpretive services to assist at discharge as needed  - Refer to Case Management Department for coordinating discharge planning if the patient needs post-hospital services based on physician/advanced practitioner order or complex needs related to functional status, cognitive ability, or social support system  6/4/2025 0011 by Adelaida Gamble RN  Outcome: Progressing  6/4/2025 0009 by Adelaida Gamble RN  Outcome: Progressing     Problem: Knowledge Deficit  Goal: Patient/family/caregiver demonstrates understanding of disease process, treatment plan, medications, and discharge instructions  Description: Complete learning assessment and assess knowledge base.  Interventions:  - Provide teaching at level  of understanding  - Provide teaching via preferred learning methods  6/4/2025 0011 by Adelaida Gamble RN  Outcome: Progressing  6/4/2025 0009 by Adelaida Gamble RN  Outcome: Progressing     Problem: Nutrition/Hydration-ADULT  Goal: Nutrient/Hydration intake appropriate for improving, restoring or maintaining nutritional needs  Description: Monitor and assess patient's nutrition/hydration status for malnutrition. Collaborate with interdisciplinary team and initiate plan and interventions as ordered.  Monitor patient's weight and dietary intake as ordered or per policy. Utilize nutrition screening tool and intervene as necessary. Determine patient's food preferences and provide high-protein, high-caloric foods as appropriate.     INTERVENTIONS:  - Monitor oral intake, urinary output, labs, and treatment plans  - Assess nutrition and hydration status and recommend course of action  - Evaluate amount of meals eaten  - Assist patient with eating if necessary   - Allow adequate time for meals  - Recommend/ encourage appropriate diets, oral nutritional supplements, and vitamin/mineral supplements  - Order, calculate, and assess calorie counts as needed  - Recommend, monitor, and adjust tube feedings and TPN/PPN based on assessed needs  - Assess need for intravenous fluids  - Provide specific nutrition/hydration education as appropriate  - Include patient/family/caregiver in decisions related to nutrition  6/4/2025 0011 by Adelaida Gamble RN  Outcome: Progressing  6/4/2025 0009 by Adelaida Gamble RN  Outcome: Progressing

## 2025-06-04 NOTE — CONSULTS
Nutrition consult received for TF recommendations. PT failed MBS/VFSS (6/3), for PEG placement (6/4). Once PEG placed and ok to use:    1) REC: Jevity 1.5@20ml/hr, increase 10ml q4-6hrs to goal rate 50ml/hr x24hrs, 220ml H20 Flush q6hrs, to provided total volume 1200ml, 1800cal, 77g protein, 1792ml Total Free H2O.     Of note, PT was receiving Levothyroxine - currently held. If med resumed and to be administered via PEG;    2)  REC: Jevity 1.5 @55ml/hr [id number] (holding for levothyroxine 1hr before and 1hr after medication administration), H2O flush 220ml H2O q6hrs, to provide total volume 1210ml, 1815cal, 77g Protein, 1800ml total Free H2O.     In preparation for discharge home, and PT would like to have bolus feeding due to very active lifestyle and to maintain quality of life,     3) Rec: bolus feeds, Jevity 1.5 240ml 5xdaily, 100ml H2O flush 5x day, 30ml H2O flush before and after feed - to provide ~1200ml total volume, 1775cal, 76g protien, 1700ml total Free H2O.     May adjust free H2O flush as needed.     With bolus feeding, administer levothyroxine 1hr before or after feed as well.     HOB >35-40degrees, Check residuals, monitor TF tolerance. Will continue to follow.

## 2025-06-04 NOTE — NURSING NOTE
Bright red blood at PEG tube site. Dr. Dai made aware and at bedside. Dr. Dai changed split gauze dressing and stated likely bleding due to Lovenox; will check on pt again in room.

## 2025-06-04 NOTE — ASSESSMENT & PLAN NOTE
Patient presented to ED with worsening dysphagia past few days. 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 Unable to keep down solid foods x 3 days.  CT chest: Patchy groundglass opacity and tree-in-bud nodularity in the right middle lobe and left lower lobe and mild groundglass opacity in the right lower lobe compatible with aspiration. Extensive hyperdensity in the right middle lobe and both lower lobes compatible with aspirated barium.  CT soft tissue neck: Postoperative changes of left parotidectomy and radical neck dissection with flap reconstruction. Asymmetric soft tissue thickening throughout the left neck with encasement of the left common carotid and cervical ICA. While this may represent posttreatment change, underlying recurrent neoplastic disease is not excluded. Correlation with prior imaging is recommended. Consider follow-up PET/CT imaging.   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

## 2025-06-04 NOTE — PROGRESS NOTES
Progress Note - Hospitalist   Name: Kelly Lind 75 y.o. female I MRN: [id number]  Unit/Bed#: [address] [place] [address] Date of Admission: 6/1/2025   Date of Service: 6/4/2025 I Hospital Day: 3    Assessment & Plan  Dysphagia  Patient presented to ED with worsening dysphagia past few days. 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 Unable to keep down solid foods x 3 days.  CT chest: Patchy groundglass opacity and tree-in-bud nodularity in the right middle lobe and left lower lobe and mild groundglass opacity in the right lower lobe compatible with aspiration. Extensive hyperdensity in the right middle lobe and both lower lobes compatible with aspirated barium.  CT soft tissue neck: Postoperative changes of left parotidectomy and radical neck dissection with flap reconstruction. Asymmetric soft tissue thickening throughout the left neck with encasement of the left common carotid and cervical ICA. While this may represent posttreatment change, underlying recurrent neoplastic disease is not excluded. Correlation with prior imaging is recommended. Consider follow-up PET/CT imaging.   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 Asymmetric soft tissue thickening throughout the left neck with encasement of the left common carotid and  cervical ICA. While this may represent posttreatment change, underlying recurrent neoplastic disease is not excluded. Correlation with prior imaging is recommended. Consider follow-up PET/CT imaging.  Outpatient follow up with oncology  Aspiration pneumonitis (HCC)  CT chest: Patchy groundglass opacity and tree-in-bud nodularity in the right middle lobe and left lower lobe and mild groundglass opacity in the right lower lobe compatible with aspiration.  POA due to dysphagia and regurgitation of food and liquid x 3 days PTA  Afebrile, no leukocytosis, hemodynamically stable, procal negative, no need for abx  Plan as above  Hyponatremia  Sodium 131 on admission, likely due to poor oral intake in setting of dysphagia  Currently on IVF while NPO, sodium 139 this morning  Daily bmp  Essential hypertension  Home regimen includes fosinopril and Dyazide, currently held due to dysphagia/NPO status  BP acceptable, hydralazine as needed  Resume oral medications when able  Acquired hypothyroidism  Patient normally takes levothyroxine 100 mcg daily  Received IV levothyroxine 70 mcg  x 1 dose on 6/1  Resume home levothyroxine when able  Anxiety  Patient normally is on Paxil 20 mg p.o. daily, hold currently due to dysphagia and NPO status  Supportive care    VTE Pharmacologic Prophylaxis:   Moderate Risk (Score 3-4) - Pharmacological DVT Prophylaxis Ordered: enoxaparin (Lovenox).    Mobility:   Basic Mobility Inpatient Raw Score: 24  JH-HLM Goal: 8: Walk 250 feet or more  JH-HLM Achieved: 8: Walk 250 feet ot more  JH-HLM Goal achieved. Continue to encourage appropriate mobility.    Patient Centered Rounds: I performed bedside rounds with nursing staff today.   Discussions with Specialists or Other Care Team Provider: multidisciplinary team    Education and Discussions with Family / Patient: Attempted to update  (daughter) via phone. Left voicemail.     Current Length of Stay: 3 day(s)  Current Patient Status:  Inpatient   Certification Statement: The patient will continue to require additional inpatient hospital stay due to peg tube placement, nutrition consult, repeat labs in am   Discharge Plan: Anticipate discharge tomorrow to home.    Code Status: Level 1 - Full Code    Subjective   Patient seen sitting up in bed resting comfortably.  Denies any pain.  No nausea or vomiting.  Continues with significant dysphagia.  She is n.p.o., notes that she will be going for her PEG tube placement this afternoon.  Slept okay last night.  Denies any pain.    Objective :  Temp:  [98 °F (36.7 °C)-98.4 °F (36.9 °C)] 98 °F (36.7 °C)  HR:  [72-88] 72  BP: (139-142)/(79-91) 140/87  Resp:  [17-20] 17  SpO2:  [92 %-95 %] 95 %  O2 Device: None (Room air)    Body mass index is 20.88 kg/m².     Input and Output Summary (last 24 hours):     Intake/Output Summary (Last 24 hours) at 6/4/2025 1142  Last data filed at 6/3/2025 1300  Gross per 24 hour   Intake 0 ml   Output --   Net 0 ml       Physical Exam  Vitals and nursing note reviewed.   Constitutional:       Appearance: Normal appearance.   HENT:      Head: Normocephalic.      Nose: Nose normal.      Mouth/Throat:      Mouth: Mucous membranes are dry.     Eyes:      Extraocular Movements: Extraocular movements intact.      Conjunctiva/sclera: Conjunctivae normal.      Pupils: Pupils are equal, round, and reactive to light.     Neck:      Comments: Scarring noted on left side of throat   Cardiovascular:      Rate and Rhythm: Normal rate and regular rhythm.      Pulses: Normal pulses.      Heart sounds: Normal heart sounds.   Pulmonary:      Effort: Pulmonary effort is normal.      Breath sounds: Normal breath sounds.   Abdominal:      General: There is no distension.      Palpations: Abdomen is soft.      Tenderness: There is no abdominal tenderness.   Genitourinary:     Comments: Voiding spontaneously     Musculoskeletal:         General: Normal range of motion.      Right lower leg: No  edema.      Left lower leg: No edema.     Skin:     General: Skin is warm and dry.      Capillary Refill: Capillary refill takes less than 2 seconds.     Neurological:      General: No focal deficit present.      Mental Status: She is alert and oriented to person, place, and time.     Psychiatric:         Mood and Affect: Mood normal.         Behavior: Behavior normal.         Thought Content: Thought content normal.         Judgment: Judgment normal.           Lines/Drains:              Lab Results: I have reviewed the following results:   Results from last 7 days   Lab Units 06/04/25  0503 06/02/25 0453 06/01/25  1122   WBC Thousand/uL 6.38   < > 6.72   HEMOGLOBIN g/dL 12.6   < > 13.9   HEMATOCRIT % 38.0   < > 42.1   PLATELETS Thousands/uL 200   < > 214   SEGS PCT %  --   --  83*   LYMPHO PCT %  --   --  9*   MONO PCT %  --   --  7   EOS PCT %  --   --  0    < > = values in this interval not displayed.     Results from last 7 days   Lab Units 06/04/25  0503 06/02/25 0453 06/01/25  1122   SODIUM mmol/L 139   < > 131*   POTASSIUM mmol/L 3.6   < > 3.6   CHLORIDE mmol/L 100   < > 92*   CO2 mmol/L 30   < > 30   BUN mg/dL 19   < > 9   CREATININE mg/dL 0.56*   < > 0.68   ANION GAP mmol/L 9   < > 9   CALCIUM mg/dL 8.9   < > 9.4   ALBUMIN g/dL  --   --  4.3   TOTAL BILIRUBIN mg/dL  --   --  0.67   ALK PHOS U/L  --   --  82   ALT U/L  --   --  11   AST U/L  --   --  17   GLUCOSE RANDOM mg/dL 81   < > 107    < > = values in this interval not displayed.         Results from last 7 days   Lab Units 06/03/25  0348   POC GLUCOSE mg/dl 299*         Results from last 7 days   Lab Units 06/03/25  0951   PROCALCITONIN ng/ml <0.05       Recent Cultures (last 7 days):         Imaging Results Review: I reviewed radiology reports from this admission including: barium swallow eval with speech .  Other Study Results Review: No additional pertinent studies reviewed.    Last 24 Hours Medication List:     Current Facility-Administered  Medications:     [Held by provider] aspirin chewable tablet 81 mg, Daily    enoxaparin (LOVENOX) subcutaneous injection 40 mg, Daily    hydrALAZINE (APRESOLINE) injection 5 mg, Q6H PRN    [Held by provider] levothyroxine tablet 100 mcg, Daily    [Held by provider] lisinopril (ZESTRIL) tablet 40 mg, Daily    methylPREDNISolone sodium succinate (Solu-MEDROL) injection 40 mg, Daily    ondansetron (ZOFRAN) injection 4 mg, Q6H PRN    pantoprazole (PROTONIX) injection 40 mg, Q12H LULA    [Held by provider] PARoxetine (PAXIL) tablet 20 mg, Daily    sodium chloride 0.9 % infusion, Continuous, Last Rate: 75 mL/hr (06/04/25 0203)    [Held by provider] triamterene-hydrochlorothiazide (MAXZIDE-25) 37.5-25 mg per tablet 1 tablet, Daily    Administrative Statements   Today, Patient Was Seen By: VENU Hull  I have spent a total time of greater than 45 minutes in caring for this patient on the day of the visit/encounter including Diagnostic results, Risks and benefits of tx options, Patient and family education, Impressions, Counseling / Coordination of care, Documenting in the medical record, Reviewing/placing orders in the medical record (including tests, medications, and/or procedures), and Communicating with other healthcare professionals .    **Please Note: This note may have been constructed using a voice recognition system.**

## 2025-06-04 NOTE — PLAN OF CARE
Problem: PAIN - ADULT  Goal: Verbalizes/displays adequate comfort level or baseline comfort level  Description: Interventions:  - Encourage patient to monitor pain and request assistance  - Assess pain using appropriate pain scale  - Administer analgesics as ordered based on type and severity of pain and evaluate response  - Implement non-pharmacological measures as appropriate and evaluate response  - Consider cultural and social influences on pain and pain management  - Notify physician/advanced practitioner if interventions unsuccessful or patient reports new pain  - Educate patient/family on pain management process including their role and importance of  reporting pain   - Provide non-pharmacologic/complimentary pain relief interventions  Outcome: Progressing     Problem: INFECTION - ADULT  Goal: Absence or prevention of progression during hospitalization  Description: INTERVENTIONS:  - Assess and monitor for signs and symptoms of infection  - Monitor lab/diagnostic results  - Monitor all insertion sites, i.e. indwelling lines, tubes, and drains  - Monitor endotracheal if appropriate and nasal secretions for changes in amount and color  - Bruno appropriate cooling/warming therapies per order  - Administer medications as ordered  - Instruct and encourage patient and family to use good hand hygiene technique  - Identify and instruct in appropriate isolation precautions for identified infection/condition  Outcome: Progressing  Goal: Absence of fever/infection during neutropenic period  Description: INTERVENTIONS:  - Monitor WBC  - Perform strict hand hygiene  - Limit to healthy visitors only  - No plants, dried, fresh or silk flowers with glaser in patient room  Outcome: Progressing     Problem: SAFETY ADULT  Goal: Patient will remain free of falls  Description: INTERVENTIONS:  - Educate patient/family on patient safety including physical limitations  - Instruct patient to call for assistance with activity   -  Consider consulting OT/PT to assist with strengthening/mobility based on AM PAC & JH-HLM score  - Consult OT/PT to assist with strengthening/mobility   - Keep Call bell within reach  - Keep bed low and locked with side rails adjusted as appropriate  - Keep care items and personal belongings within reach  - Initiate and maintain comfort rounds  - Make Fall Risk Sign visible to staff  - Offer Toileting every 2 Hours, in advance of need  - Initiate/Maintain bed/chair alarm  - Obtain necessary fall risk management equipment: bed/chair alarm  - Apply yellow socks and bracelet for high fall risk patients  - Consider moving patient to room near nurses station  Outcome: Progressing  Goal: Maintain or return to baseline ADL function  Description: INTERVENTIONS:  -  Assess patient's ability to carry out ADLs; assess patient's baseline for ADL function and identify physical deficits which impact ability to perform ADLs (bathing, care of mouth/teeth, toileting, grooming, dressing, etc.)  - Assess/evaluate cause of self-care deficits   - Assess range of motion  - Assess patient's mobility; develop plan if impaired  - Assess patient's need for assistive devices and provide as appropriate  - Encourage maximum independence but intervene and supervise when necessary  - Involve family in performance of ADLs  - Assess for home care needs following discharge   - Consider OT consult to assist with ADL evaluation and planning for discharge  - Provide patient education as appropriate  - Monitor functional capacity and physical performance, use of AM PAC & JH-HLM   - Monitor gait, balance and fatigue with ambulation    Outcome: Progressing  Goal: Maintains/Returns to pre admission functional level  Description: INTERVENTIONS:  - Perform AM-PAC 6 Click Basic Mobility/ Daily Activity assessment daily.  - Set and communicate daily mobility goal to care team and patient/family/caregiver.   - Collaborate with rehabilitation services on mobility  goals if consulted  - Perform Range of Motion 3 times a day.  - Reposition patient every 3 hours.  - Dangle patient 3 times a day  - Stand patient 3 times a day  - Ambulate patient 3 times a day  - Out of bed to chair 3 times a day   - Out of bed for meals 3 times a day  - Out of bed for toileting  - Record patient progress and toleration of activity level   Outcome: Progressing     Problem: DISCHARGE PLANNING  Goal: Discharge to home or other facility with appropriate resources  Description: INTERVENTIONS:  - Identify barriers to discharge w/patient and caregiver  - Arrange for needed discharge resources and transportation as appropriate  - Identify discharge learning needs (meds, wound care, etc.)  - Arrange for interpretive services to assist at discharge as needed  - Refer to Case Management Department for coordinating discharge planning if the patient needs post-hospital services based on physician/advanced practitioner order or complex needs related to functional status, cognitive ability, or social support system  Outcome: Progressing     Problem: Knowledge Deficit  Goal: Patient/family/caregiver demonstrates understanding of disease process, treatment plan, medications, and discharge instructions  Description: Complete learning assessment and assess knowledge base.  Interventions:  - Provide teaching at level of understanding  - Provide teaching via preferred learning methods  Outcome: Progressing     Problem: Nutrition/Hydration-ADULT  Goal: Nutrient/Hydration intake appropriate for improving, restoring or maintaining nutritional needs  Description: Monitor and assess patient's nutrition/hydration status for malnutrition. Collaborate with interdisciplinary team and initiate plan and interventions as ordered.  Monitor patient's weight and dietary intake as ordered or per policy. Utilize nutrition screening tool and intervene as necessary. Determine patient's food preferences and provide high-protein,  high-caloric foods as appropriate.     INTERVENTIONS:  - Monitor oral intake, urinary output, labs, and treatment plans  - Assess nutrition and hydration status and recommend course of action  - Evaluate amount of meals eaten  - Assist patient with eating if necessary   - Allow adequate time for meals  - Recommend/ encourage appropriate diets, oral nutritional supplements, and vitamin/mineral supplements  - Order, calculate, and assess calorie counts as needed  - Recommend, monitor, and adjust tube feedings and TPN/PPN based on assessed needs  - Assess need for intravenous fluids  - Provide specific nutrition/hydration education as appropriate  - Include patient/family/caregiver in decisions related to nutrition  Outcome: Progressing

## 2025-06-04 NOTE — NURSING NOTE
Pt returned to room 408. Bedside report given to JOCELIN Choi. Scant bright red drainage noted at Peg Tube site; advised Dr. Dai aware; dressing changed in phase 2 and stated he will be up to see the pt. Advised of HR upper 120s, esmolol given by anesthesia; no symptoms and HR now baseline in the 70s-80s. Call bell within reach, side rails up, bed locked and in lowest position. Per pt and receiving RN, no additional questions or concerns at this time. Peg tub booklet provided to receiving RN

## 2025-06-04 NOTE — ANESTHESIA PREPROCEDURE EVALUATION
Procedure:  EGD    Relevant Problems   CARDIO   (+) Essential hypertension      ENDO   (+) Acquired hypothyroidism      GI/HEPATIC   (+) Dysphagia      NEURO/PSYCH   (+) Anxiety      Respiratory/Allergy   (+) Aspiration pneumonitis (HCC)    Dysphagia  Patient presented to ED with worsening dysphagia past few days. 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 Unable to keep down solid foods x 3 days.  CT chest: Patchy groundglass opacity and tree-in-bud nodularity in the right middle lobe and left lower lobe and mild groundglass opacity in the right lower lobe compatible with aspiration. Extensive hyperdensity in the right middle lobe and both lower lobes compatible with aspirated barium.  CT soft tissue neck: Postoperative changes of left parotidectomy and radical neck dissection with flap reconstruction. Asymmetric soft tissue thickening throughout the left neck with encasement of the left common carotid and cervical ICA. While this may represent posttreatment change, underlying recurrent neoplastic disease is not excluded. Correlation with prior imaging is recommended. Consider follow-up PET/CT imaging.   ENT consulted, noted edematous supraglottic tissues on exam, recommended Solumedrol and PPI  GI consulted, s/p EGD on 6/2 which revealed radiation induced stricture which was dilated    Physical Exam    Airway     Mallampati score: II  TM Distance: >3 FB  Neck ROM: full      Cardiovascular  Cardiovascular exam normal    Dental   Comment: Denies loose teeth     Pulmonary  Pulmonary exam normal     Neurological    She appears awake, alert and oriented x3.      Other Findings  post-pubertal.      Anesthesia Plan  ASA Score- 4     Anesthesia Type- IV sedation with anesthesia with ASA Monitors.         Additional Monitors:     Airway Plan:            Plan Factors-Exercise tolerance (METS): >4 METS.    Chart reviewed.   Existing labs reviewed.  Patient summary reviewed.    Patient is not a current smoker.              Induction- intravenous.    Postoperative Plan- .   Monitoring Plan - Monitoring plan - standard ASA monitoring      Perioperative Resuscitation Plan - Level 1 - Full Code.       Informed Consent- Anesthetic plan and risks discussed with patient.  I personally reviewed this patient with the CRNA. Discussed and agreed on the Anesthesia Plan with the CRNA..      NPO Status:  Vitals Value Taken Time   Date of last liquid 06/01/25 06/04/25 14:08   Time of last liquid 1400 06/02/25 14:37   Date of last solid 05/28/25 06/04/25 14:08   Time of last solid 1600 06/02/25 14:37

## 2025-06-04 NOTE — ANESTHESIA POSTPROCEDURE EVALUATION
Post-Op Assessment Note    CV Status:  Stable    Pain management: adequate       Mental Status:  Alert and awake   Hydration Status:  Euvolemic   PONV Controlled:  Controlled   Airway Patency:  Patent     Post Op Vitals Reviewed: Yes    No anethesia notable event occurred.          Received call from RN who reported that pt remained tachycardic, into the 120's, after procedure. Denied pain, and all other VSS. Esmolol given by me with improvement in HR.     Last Filed PACU Vitals:  Vitals Value Taken Time   Temp     Pulse 76    /83    Resp     SpO2 96        Modified Machelle:     Vitals Value Taken Time   Activity 2 06/04/25 16:07   Respiration 2 06/04/25 16:07   Circulation 2 06/04/25 16:07   Consciousness 2 06/04/25 16:07   Oxygen Saturation 2 06/04/25 16:07     Modified Machelle Score: 10

## 2025-06-04 NOTE — CASE MANAGEMENT
Case Management Discharge Planning Note    Patient name Kelly Lind  [address] [address]/[address]-* MRN [id number]  : 1950 Date 2025       Current Admission Date: 2025  Current Admission Diagnosis:Dysphagia   Patient Active Problem List    Diagnosis Date Noted    Aspiration pneumonitis (HCC) 2025    Hyponatremia 2025    Thyroid nodule 2024    Dysphagia 10/02/2023    Essential hypertension 2021    Acquired hypothyroidism 2021    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(HCC) 2021    Meniere's disease of both ears 2021    Anxiety 2021      LOS (days): 3  Geometric Mean LOS (GMLOS) (days): 3.8  Days to GMLOS:0.7     OBJECTIVE:  Risk of Unplanned Readmission Score: 11.61         Current admission status: Inpatient   Preferred Pharmacy:   [facility]/pharmacy #[id number] - [place], [place] - [address] [address] [address] [facility] [address]  Phone: [phone] Fax: [phone]    Primary Care Provider: Rubi Leo MD    Primary Insurance: MEDICARE  Secondary Insurance: BLUE CROSS    DISCHARGE DETAILS:       DME Referral Provided  Referral made for DME?: Yes  DME referral completed for the following items:: Other (Enteral feeding formula and supplies)  DME Supplier Name:: [employer]    Other Referral/Resources/Interventions Provided:  Interventions: DME    Would you like to participate in our [facility] service program?  : No - Declined    Treatment Team Recommendation: Home  Discharge Destination Plan:: Home  Transport at Discharge : Family

## 2025-06-04 NOTE — ASSESSMENT & PLAN NOTE
Sodium 131 on admission, likely due to poor oral intake in setting of dysphagia  Currently on IVF while NPO, sodium 139 this morning  Daily bmp

## 2025-06-04 NOTE — ANESTHESIA POSTPROCEDURE EVALUATION
Post-Op Assessment Note    CV Status:  Stable         Mental Status:  Sleepy   Hydration Status:  Stable   PONV Controlled:  Controlled   Airway Patency:  Patent     Post Op Vitals Reviewed: Yes    No anethesia notable event occurred.    Staff: CRNA           Last Filed PACU Vitals:  Vitals Value Taken Time   Temp     Pulse 125    /85    Resp 20    SpO2 100

## 2025-06-05 ENCOUNTER — TRANSITIONAL CARE MANAGEMENT (OUTPATIENT)
Dept: FAMILY MEDICINE CLINIC | Facility: CLINIC | Age: 75
End: 2025-06-05

## 2025-06-05 VITALS
HEIGHT: 70 IN | BODY MASS INDEX: 20.83 KG/M2 | TEMPERATURE: 97.9 F | DIASTOLIC BLOOD PRESSURE: 84 MMHG | SYSTOLIC BLOOD PRESSURE: 143 MMHG | RESPIRATION RATE: 19 BRPM | HEART RATE: 78 BPM | OXYGEN SATURATION: 91 % | WEIGHT: 145.5 LBS

## 2025-06-05 LAB
ANION GAP SERPL CALCULATED.3IONS-SCNC: 16 MMOL/L (ref 4–13)
BUN SERPL-MCNC: 15 MG/DL (ref 5–25)
CALCIUM SERPL-MCNC: 9.2 MG/DL (ref 8.4–10.2)
CHLORIDE SERPL-SCNC: 95 MMOL/L (ref 96–108)
CO2 SERPL-SCNC: 23 MMOL/L (ref 21–32)
CREAT SERPL-MCNC: 0.61 MG/DL (ref 0.6–1.3)
DME PARACHUTE DELIVERY DATE ACTUAL: NORMAL
DME PARACHUTE DELIVERY DATE REQUESTED: NORMAL
DME PARACHUTE ITEM DESCRIPTION: NORMAL
DME PARACHUTE ORDER STATUS: NORMAL
DME PARACHUTE SUPPLIER NAME: NORMAL
DME PARACHUTE SUPPLIER PHONE: NORMAL
ERYTHROCYTE [DISTWIDTH] IN BLOOD BY AUTOMATED COUNT: 12.9 % (ref 11.6–15.1)
GFR SERPL CREATININE-BSD FRML MDRD: 89 ML/MIN/1.73SQ M
GLUCOSE SERPL-MCNC: 81 MG/DL (ref 65–140)
HCT VFR BLD AUTO: 42 % (ref 34.8–46.1)
HGB BLD-MCNC: 13.8 G/DL (ref 11.5–15.4)
MCH RBC QN AUTO: 29.7 PG (ref 26.8–34.3)
MCHC RBC AUTO-ENTMCNC: 32.9 G/DL (ref 31.4–37.4)
MCV RBC AUTO: 90 FL (ref 82–98)
PLATELET # BLD AUTO: 222 THOUSANDS/UL (ref 149–390)
PMV BLD AUTO: 9.6 FL (ref 8.9–12.7)
POTASSIUM SERPL-SCNC: 3.5 MMOL/L (ref 3.5–5.3)
RBC # BLD AUTO: 4.65 MILLION/UL (ref 3.81–5.12)
SODIUM SERPL-SCNC: 134 MMOL/L (ref 135–147)
WBC # BLD AUTO: 7.41 THOUSAND/UL (ref 4.31–10.16)

## 2025-06-05 PROCEDURE — 80048 BASIC METABOLIC PNL TOTAL CA: CPT | Performed by: NURSE PRACTITIONER

## 2025-06-05 PROCEDURE — 85027 COMPLETE CBC AUTOMATED: CPT | Performed by: NURSE PRACTITIONER

## 2025-06-05 PROCEDURE — 99232 SBSQ HOSP IP/OBS MODERATE 35: CPT | Performed by: INTERNAL MEDICINE

## 2025-06-05 PROCEDURE — 99239 HOSP IP/OBS DSCHRG MGMT >30: CPT | Performed by: NURSE PRACTITIONER

## 2025-06-05 RX ADMIN — TRIAMTERENE AND HYDROCHLOROTHIAZIDE 1 TABLET: 37.5; 25 TABLET ORAL at 08:16

## 2025-06-05 RX ADMIN — PAROXETINE 20 MG: 20 TABLET, FILM COATED ORAL at 08:16

## 2025-06-05 RX ADMIN — PANTOPRAZOLE SODIUM 40 MG: 40 INJECTION, POWDER, FOR SOLUTION INTRAVENOUS at 08:15

## 2025-06-05 RX ADMIN — CEFAZOLIN SODIUM 1000 MG: 1 SOLUTION INTRAVENOUS at 07:26

## 2025-06-05 RX ADMIN — LISINOPRIL 40 MG: 20 TABLET ORAL at 08:15

## 2025-06-05 RX ADMIN — LEVOTHYROXINE SODIUM 100 MCG: 100 TABLET ORAL at 05:55

## 2025-06-05 RX ADMIN — METHYLPREDNISOLONE SODIUM SUCCINATE 40 MG: 40 INJECTION, POWDER, FOR SOLUTION INTRAMUSCULAR; INTRAVENOUS at 08:15

## 2025-06-05 NOTE — ASSESSMENT & PLAN NOTE
CT chest: Patchy groundglass opacity and tree-in-bud nodularity in the right middle lobe and left lower lobe and mild groundglass opacity in the right lower lobe compatible with aspiration.  POA due to dysphagia and regurgitation of food and liquid x 3 days PTA  Afebrile, no leukocytosis, hemodynamically stable, procal negative, no need for abx  Follow-up outpatient PCP 1 to 2 weeks postdischarge

## 2025-06-05 NOTE — ASSESSMENT & PLAN NOTE
Patient presented to ED with worsening dysphagia past few days. 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 Unable to keep down solid foods x 3 days.  CT chest: Patchy groundglass opacity and tree-in-bud nodularity in the right middle lobe and left lower lobe and mild groundglass opacity in the right lower lobe compatible with aspiration. Extensive hyperdensity in the right middle lobe and both lower lobes compatible with aspirated barium.  CT soft tissue neck: Postoperative changes of left parotidectomy and radical neck dissection with flap reconstruction. Asymmetric soft tissue thickening throughout the left neck with encasement of the left common carotid and cervical ICA. While this may represent posttreatment change, underlying recurrent neoplastic disease is not excluded. Correlation with prior imaging is recommended. Consider follow-up PET/CT imaging. Follow up outpatient oncology   ENT consulted, noted edematous supraglottic tissues on exam, recommended Solumedrol and PPI  GI consulted, s/p EGD on 6/2 which revealed radiation induced stricture which was dilated  Patient continues with difficulty tolerating clear liquid diet  Speech consulted, plan for repeat barium swallow which patient failed  PEG tube placed on 6/4  Discussed with carloz GUERRA to use for bolus feeding  Nutrition consulted, recommending bolus feeds Jevity 1.5 240 mL 5 times daily along with 100 mL water flush 5 times daily, flush with 30 cc water pre and post feeding  Patient will be dc to home today; follow up outpatient PCP

## 2025-06-05 NOTE — ASSESSMENT & PLAN NOTE
Sodium 131 on admission, likely due to poor oral intake in setting of dysphagia  Improved  Patient starting on bolus feedings with Jevity 1.5 240 mL 5 times daily, 100 mL H2O boluses 5 times daily, flush PEG tube with 30 mL water pre and post administration of Jevity.  Recommend repeat BMP in 1 week  Follow-up outpatient PCP 1 to 2 weeks postdischarge

## 2025-06-05 NOTE — CASE MANAGEMENT
Case Management Discharge Planning Note    Patient name Kelly Lind  Location [address] [address]/[address] [address]-* MRN [id number]  : 1950 Date 2025       Current Admission Date: 2025  Current Admission Diagnosis:Dysphagia   Patient Active Problem List    Diagnosis Date Noted    Aspiration pneumonitis (HCC) 2025    Hyponatremia 2025    Thyroid nodule 2024    Dysphagia 10/02/2023    Essential hypertension 2021    Acquired hypothyroidism 2021    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(HCC) 2021    Meniere's disease of both ears 2021    Anxiety 2021      LOS (days): 4  Geometric Mean LOS (GMLOS) (days): 3.8  Days to GMLOS:-0.1     OBJECTIVE:  Risk of Unplanned Readmission Score: 11.5         Current admission status: Inpatient   Preferred Pharmacy:   [facility]/pharmacy #[id number] - [place], [place] - [address]  [address] [address]  Phone: [phone] Fax: [phone]    Primary Care Provider: Rubi Leo MD    Primary Insurance: MEDICARE  Secondary Insurance: BLUE CROSS    DISCHARGE DETAILS:    Discharge planning discussed with:: Patient  Freedom of Choice: Yes    Comments - Freedom of Choice: SW spoke with patient at bedside to discuss discharge plan.  PEG was placed yesterday and patient will self-administer bolus feeds today prior to discharge.  She expressed her intent to go home today and confirmed that she has been contacted by [facility] regarding tube feed delivery.  Patient has been on tube feeds in the past and is familiar with managing them.  Per [facility] in [place], supplies will be delivered in 24-48 hours.  Flush information was placed in chat box as there was no place to enter in the script.  [facility] representative acknowledged that information was received.  Patient will be given supplies to use at home until she receives her delivery.  She has a ride home from a friend or family member.       Were Treatment Team discharge  recommendations reviewed with patient/caregiver?: Yes  Did patient/caregiver verbalize understanding of patient care needs?: Yes  Were patient/caregiver advised of the risks associated with not following Treatment Team discharge recommendations?: Yes     DME Referral Provided  Referral made for DME?: Yes  DME referral completed for the following items:: Other (Enteral feeding formula and supplies)  DME Supplier Name:: [employer]    Other Referral/Resources/Interventions Provided:  Interventions: DME    Would you like to participate in our [facility] service program?  : No - Declined    Treatment Team Recommendation: Home  Discharge Destination Plan:: Home  Transport at Discharge : Family         IMM Given (Date):: 06/05/25  IMM Given to:: Patient (IMM reviewed with and signed by patient.  Copy placed in scan bin for chart.)

## 2025-06-05 NOTE — PLAN OF CARE
Problem: PAIN - ADULT  Goal: Verbalizes/displays adequate comfort level or baseline comfort level  Description: Interventions:  - Encourage patient to monitor pain and request assistance  - Assess pain using appropriate pain scale  - Administer analgesics as ordered based on type and severity of pain and evaluate response  - Implement non-pharmacological measures as appropriate and evaluate response  - Consider cultural and social influences on pain and pain management  - Notify physician/advanced practitioner if interventions unsuccessful or patient reports new pain  - Educate patient/family on pain management process including their role and importance of  reporting pain   - Provide non-pharmacologic/complimentary pain relief interventions  Outcome: Progressing     Problem: INFECTION - ADULT  Goal: Absence or prevention of progression during hospitalization  Description: INTERVENTIONS:  - Assess and monitor for signs and symptoms of infection  - Monitor lab/diagnostic results  - Monitor all insertion sites, i.e. indwelling lines, tubes, and drains  - Monitor endotracheal if appropriate and nasal secretions for changes in amount and color  - [place] appropriate cooling/warming therapies per order  - Administer medications as ordered  - Instruct and encourage patient and family to use good hand hygiene technique  - Identify and instruct in appropriate isolation precautions for identified infection/condition  Outcome: Progressing  Goal: Absence of fever/infection during neutropenic period  Description: INTERVENTIONS:  - Monitor WBC  - Perform strict hand hygiene  - Limit to healthy visitors only  - No plants, dried, fresh or silk flowers with glaser in patient room  Outcome: Progressing     Problem: SAFETY ADULT  Goal: Patient will remain free of falls  Description: INTERVENTIONS:  - Educate patient/family on patient safety including physical limitations  - Instruct patient to call for assistance with activity   -  Consider consulting OT/PT to assist with strengthening/mobility based on AM PAC & JH-HLM score  - Consult OT/PT to assist with strengthening/mobility   - Keep Call bell within reach  - Keep bed low and locked with side rails adjusted as appropriate  - Keep care items and personal belongings within reach  - Initiate and maintain comfort rounds  - Make Fall Risk Sign visible to staff  - Offer Toileting every 2 Hours, in advance of need  - Initiate/Maintain bed/chair alarm  - Obtain necessary fall risk management equipment: bed/chair alarm  - Apply yellow socks and bracelet for high fall risk patients  - Consider moving patient to room near nurses station  Outcome: Progressing  Goal: Maintain or return to baseline ADL function  Description: INTERVENTIONS:  -  Assess patient's ability to carry out ADLs; assess patient's baseline for ADL function and identify physical deficits which impact ability to perform ADLs (bathing, care of mouth/teeth, toileting, grooming, dressing, etc.)  - Assess/evaluate cause of self-care deficits   - Assess range of motion  - Assess patient's mobility; develop plan if impaired  - Assess patient's need for assistive devices and provide as appropriate  - Encourage maximum independence but intervene and supervise when necessary  - Involve family in performance of ADLs  - Assess for home care needs following discharge   - Consider OT consult to assist with ADL evaluation and planning for discharge  - Provide patient education as appropriate  - Monitor functional capacity and physical performance, use of AM PAC & JH-HLM   - Monitor gait, balance and fatigue with ambulation    Outcome: Progressing  Goal: Maintains/Returns to pre admission functional level  Description: INTERVENTIONS:  - Perform AM-PAC 6 Click Basic Mobility/ Daily Activity assessment daily.  - Set and communicate daily mobility goal to care team and patient/family/caregiver.   - Collaborate with rehabilitation services on mobility  goals if consulted  - Perform Range of Motion 3 times a day.  - Reposition patient every 3 hours.  - Dangle patient 3 times a day  - Stand patient 3 times a day  - Ambulate patient 3 times a day  - Out of bed to chair 3 times a day   - Out of bed for meals 3 times a day  - Out of bed for toileting  - Record patient progress and toleration of activity level   Outcome: Progressing     Problem: DISCHARGE PLANNING  Goal: Discharge to home or other facility with appropriate resources  Description: INTERVENTIONS:  - Identify barriers to discharge w/patient and caregiver  - Arrange for needed discharge resources and transportation as appropriate  - Identify discharge learning needs (meds, wound care, etc.)  - Arrange for interpretive services to assist at discharge as needed  - Refer to Case Management Department for coordinating discharge planning if the patient needs post-hospital services based on physician/advanced practitioner order or complex needs related to functional status, cognitive ability, or social support system  Outcome: Progressing     Problem: Knowledge Deficit  Goal: Patient/family/caregiver demonstrates understanding of disease process, treatment plan, medications, and discharge instructions  Description: Complete learning assessment and assess knowledge base.  Interventions:  - Provide teaching at level of understanding  - Provide teaching via preferred learning methods  Outcome: Progressing     Problem: Nutrition/Hydration-ADULT  Goal: Nutrient/Hydration intake appropriate for improving, restoring or maintaining nutritional needs  Description: Monitor and assess patient's nutrition/hydration status for malnutrition. Collaborate with interdisciplinary team and initiate plan and interventions as ordered.  Monitor patient's weight and dietary intake as ordered or per policy. Utilize nutrition screening tool and intervene as necessary. Determine patient's food preferences and provide high-protein,  high-caloric foods as appropriate.     INTERVENTIONS:  - Monitor oral intake, urinary output, labs, and treatment plans  - Assess nutrition and hydration status and recommend course of action  - Evaluate amount of meals eaten  - Assist patient with eating if necessary   - Allow adequate time for meals  - Recommend/ encourage appropriate diets, oral nutritional supplements, and vitamin/mineral supplements  - Order, calculate, and assess calorie counts as needed  - Recommend, monitor, and adjust tube feedings and TPN/PPN based on assessed needs  - Assess need for intravenous fluids  - Provide specific nutrition/hydration education as appropriate  - Include patient/family/caregiver in decisions related to nutrition  Outcome: Progressing     Problem: Potential for Falls  Goal: Patient will remain free of falls  Description: INTERVENTIONS:  - Educate patient/family on patient safety including physical limitations  - Instruct patient to call for assistance with activity   - Consider consulting OT/PT to assist with strengthening/mobility based on AM PAC & -HLM score  - Consult OT/PT to assist with strengthening/mobility   - Keep Call bell within reach  - Keep bed low and locked with side rails adjusted as appropriate  - Keep care items and personal belongings within reach  - Initiate and maintain comfort rounds  - Make Fall Risk Sign visible to staff  - Offer Toileting every 2 Hours, in advance of need  - Initiate/Maintain bed/chair alarm  - Obtain necessary fall risk management equipment: bed/chair alarm  - Apply yellow socks and bracelet for high fall risk patients  - Consider moving patient to room near nurses station  Outcome: Progressing

## 2025-06-05 NOTE — ASSESSMENT & PLAN NOTE
Patient normally takes levothyroxine 100 mcg daily  Received IV levothyroxine 70 mcg  x 1 dose on 6/1  Able to resume oral medications crushed through PEG tube

## 2025-06-05 NOTE — NURSING NOTE
AVS Reviewed with patient , feeding and supplies given to her, and lab ordered reviewed with her. Left unit via w/c assisted to her friends car.

## 2025-06-05 NOTE — PLAN OF CARE
Problem: PAIN - ADULT  Goal: Verbalizes/displays adequate comfort level or baseline comfort level  Description: Interventions:  - Encourage patient to monitor pain and request assistance  - Assess pain using appropriate pain scale  - Administer analgesics as ordered based on type and severity of pain and evaluate response  - Implement non-pharmacological measures as appropriate and evaluate response  - Consider cultural and social influences on pain and pain management  - Notify physician/advanced practitioner if interventions unsuccessful or patient reports new pain  - Educate patient/family on pain management process including their role and importance of  reporting pain   - Provide non-pharmacologic/complimentary pain relief interventions  Outcome: Progressing     Problem: INFECTION - ADULT  Goal: Absence or prevention of progression during hospitalization  Description: INTERVENTIONS:  - Assess and monitor for signs and symptoms of infection  - Monitor lab/diagnostic results  - Monitor all insertion sites, i.e. indwelling lines, tubes, and drains  - Monitor endotracheal if appropriate and nasal secretions for changes in amount and color  - [place] appropriate cooling/warming therapies per order  - Administer medications as ordered  - Instruct and encourage patient and family to use good hand hygiene technique  - Identify and instruct in appropriate isolation precautions for identified infection/condition  Outcome: Progressing  Goal: Absence of fever/infection during neutropenic period  Description: INTERVENTIONS:  - Monitor WBC  - Perform strict hand hygiene  - Limit to healthy visitors only  - No plants, dried, fresh or silk flowers with glaser in patient room  Outcome: Progressing     Problem: SAFETY ADULT  Goal: Patient will remain free of falls  Description: INTERVENTIONS:  - Educate patient/family on patient safety including physical limitations  - Instruct patient to call for assistance with activity   -  Consider consulting OT/PT to assist with strengthening/mobility based on AM PAC & JH-HLM score  - Consult OT/PT to assist with strengthening/mobility   - Keep Call bell within reach  - Keep bed low and locked with side rails adjusted as appropriate  - Keep care items and personal belongings within reach  - Initiate and maintain comfort rounds  - Make Fall Risk Sign visible to staff  - Offer Toileting every 2 Hours, in advance of need  - Initiate/Maintain bed/chair alarm  - Obtain necessary fall risk management equipment: bed/chair alarm  - Apply yellow socks and bracelet for high fall risk patients  - Consider moving patient to room near nurses station  Outcome: Progressing  Goal: Maintain or return to baseline ADL function  Description: INTERVENTIONS:  -  Assess patient's ability to carry out ADLs; assess patient's baseline for ADL function and identify physical deficits which impact ability to perform ADLs (bathing, care of mouth/teeth, toileting, grooming, dressing, etc.)  - Assess/evaluate cause of self-care deficits   - Assess range of motion  - Assess patient's mobility; develop plan if impaired  - Assess patient's need for assistive devices and provide as appropriate  - Encourage maximum independence but intervene and supervise when necessary  - Involve family in performance of ADLs  - Assess for home care needs following discharge   - Consider OT consult to assist with ADL evaluation and planning for discharge  - Provide patient education as appropriate  - Monitor functional capacity and physical performance, use of AM PAC & JH-HLM   - Monitor gait, balance and fatigue with ambulation    Outcome: Progressing  Goal: Maintains/Returns to pre admission functional level  Description: INTERVENTIONS:  - Perform AM-PAC 6 Click Basic Mobility/ Daily Activity assessment daily.  - Set and communicate daily mobility goal to care team and patient/family/caregiver.   - Collaborate with rehabilitation services on mobility  goals if consulted  - Perform Range of Motion 3 times a day.  - Reposition patient every 3 hours.  - Dangle patient 3 times a day  - Stand patient 3 times a day  - Ambulate patient 3 times a day  - Out of bed to chair 3 times a day   - Out of bed for meals 3 times a day  - Out of bed for toileting  - Record patient progress and toleration of activity level   Outcome: Progressing     Problem: DISCHARGE PLANNING  Goal: Discharge to home or other facility with appropriate resources  Description: INTERVENTIONS:  - Identify barriers to discharge w/patient and caregiver  - Arrange for needed discharge resources and transportation as appropriate  - Identify discharge learning needs (meds, wound care, etc.)  - Arrange for interpretive services to assist at discharge as needed  - Refer to Case Management Department for coordinating discharge planning if the patient needs post-hospital services based on physician/advanced practitioner order or complex needs related to functional status, cognitive ability, or social support system  Outcome: Progressing     Problem: Knowledge Deficit  Goal: Patient/family/caregiver demonstrates understanding of disease process, treatment plan, medications, and discharge instructions  Description: Complete learning assessment and assess knowledge base.  Interventions:  - Provide teaching at level of understanding  - Provide teaching via preferred learning methods  Outcome: Progressing     Problem: Nutrition/Hydration-ADULT  Goal: Nutrient/Hydration intake appropriate for improving, restoring or maintaining nutritional needs  Description: Monitor and assess patient's nutrition/hydration status for malnutrition. Collaborate with interdisciplinary team and initiate plan and interventions as ordered.  Monitor patient's weight and dietary intake as ordered or per policy. Utilize nutrition screening tool and intervene as necessary. Determine patient's food preferences and provide high-protein,  high-caloric foods as appropriate.     INTERVENTIONS:  - Monitor oral intake, urinary output, labs, and treatment plans  - Assess nutrition and hydration status and recommend course of action  - Evaluate amount of meals eaten  - Assist patient with eating if necessary   - Allow adequate time for meals  - Recommend/ encourage appropriate diets, oral nutritional supplements, and vitamin/mineral supplements  - Order, calculate, and assess calorie counts as needed  - Recommend, monitor, and adjust tube feedings and TPN/PPN based on assessed needs  - Assess need for intravenous fluids  - Provide specific nutrition/hydration education as appropriate  - Include patient/family/caregiver in decisions related to nutrition  Outcome: Progressing     Problem: Potential for Falls  Goal: Patient will remain free of falls  Description: INTERVENTIONS:  - Educate patient/family on patient safety including physical limitations  - Instruct patient to call for assistance with activity   - Consider consulting OT/PT to assist with strengthening/mobility based on AM PAC & -HLM score  - Consult OT/PT to assist with strengthening/mobility   - Keep Call bell within reach  - Keep bed low and locked with side rails adjusted as appropriate  - Keep care items and personal belongings within reach  - Initiate and maintain comfort rounds  - Make Fall Risk Sign visible to staff  - Offer Toileting every 2 Hours, in advance of need  - Initiate/Maintain bed/chair alarm  - Obtain necessary fall risk management equipment: bed/chair alarm  - Apply yellow socks and bracelet for high fall risk patients  - Consider moving patient to room near nurses station  Outcome: Progressing

## 2025-06-05 NOTE — DISCHARGE SUMMARY
Discharge Summary - Hospitalist   Name: Kelly Lind 75 y.o. female I MRN: [id number]  Unit/Bed#: [address] [place] [address] Date of Admission: 6/1/2025   Date of Service: 6/5/2025 I Hospital Day: 4     Assessment & Plan  Dysphagia  Patient presented to ED with worsening dysphagia past few days. 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 Unable to keep down solid foods x 3 days.  CT chest: Patchy groundglass opacity and tree-in-bud nodularity in the right middle lobe and left lower lobe and mild groundglass opacity in the right lower lobe compatible with aspiration. Extensive hyperdensity in the right middle lobe and both lower lobes compatible with aspirated barium.  CT soft tissue neck: Postoperative changes of left parotidectomy and radical neck dissection with flap reconstruction. Asymmetric soft tissue thickening throughout the left neck with encasement of the left common carotid and cervical ICA. While this may represent posttreatment change, underlying recurrent neoplastic disease is not excluded. Correlation with prior imaging is recommended. Consider follow-up PET/CT imaging. Follow up outpatient oncology   ENT consulted, noted edematous supraglottic tissues on exam, recommended Solumedrol and PPI  GI consulted, s/p EGD on 6/2 which revealed radiation induced stricture which was dilated  Patient continues with difficulty tolerating clear liquid diet  Speech consulted, plan for repeat barium swallow which patient failed  PEG tube placed on 6/4  Discussed with GI, okay to use for bolus feeding  Nutrition consulted, recommending bolus feeds Jevity 1.5 240 mL 5 times daily along with 100 mL water flush 5 times daily, flush with 30 cc water pre and post feeding  Patient will be dc to home today; follow up outpatient PCP  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(HCC)  Patient has a 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 Asymmetric soft tissue thickening throughout the left neck with encasement of the left common carotid and cervical ICA. While this may represent posttreatment change, underlying recurrent neoplastic disease is not excluded. Correlation with prior imaging is recommended. Consider follow-up PET/CT imaging.  Outpatient follow up with oncology  Aspiration pneumonitis (HCC)  CT chest: Patchy groundglass opacity and tree-in-bud nodularity in the right middle lobe and left lower lobe and mild groundglass opacity in the right lower lobe compatible with aspiration.  POA due to dysphagia and regurgitation of food and liquid x 3 days PTA  Afebrile, no leukocytosis, hemodynamically stable, procal negative, no need for abx  Follow-up outpatient PCP 1 to 2 weeks postdischarge  Hyponatremia  Sodium 131 on admission, likely due to poor oral intake in setting of dysphagia  Improved  Patient starting on bolus feedings with Jevity 1.5 240 mL 5 times daily, 100 mL H2O boluses 5 times daily, flush PEG tube with 30 mL water pre and post administration of Jevity.  Recommend repeat BMP in 1 week  Follow-up outpatient PCP 1 to 2 weeks postdischarge  Essential hypertension  Home regimen includes fosinopril and Dyazide, currently held due to dysphagia/NPO status  BP acceptable  Home medications resumed via PEG tube as per discussion with GI  Acquired hypothyroidism  Patient normally takes levothyroxine 100 mcg daily  Received IV levothyroxine 70 mcg  x 1 dose on 6/1  Able to resume oral medications crushed through PEG tube  Anxiety  Patient normally is on Paxil 20 mg p.o. daily, resumed  Supportive care     Medical Problems       Resolved Problems  Date Reviewed: 6/5/2025   None       Discharging Physician / Practitioner: VENU Hull  PCP: Rubi Leo MD  Admission  Date:   Admission Orders (From admission, onward)       Ordered        06/01/25 1308  INPATIENT ADMISSION  Once                          Discharge Date: 06/05/25    Next Steps for Physician/AP Assuming Care:  Patient should get PET scan to evaluate for recurrence of her cancer  She has a peg tube for nutrition/hydration and medication due to her severe dysphagia  BMP in one week   Follow up in office one to two weeks post discharge     Test Results Pending at Discharge (will require follow up):  None     Medication Changes for Discharge & Rationale:   None   See after visit summary for reconciled discharge medications provided to patient and/or family.     Consultations During Hospital Stay:  ENT  GI  Nutrition     Procedures Performed:   EGD with dilatation  ENT did bedside scope  Peg tube placement 6/4  Barium swallow video with speech 6/3     Significant Findings / Test Results:   CT soft tissue neck performed on 6/1 postoperative changes left parotidectomy and radical neck dissection with flap reconstruction, asymmetric soft tissue thickening throughout the left neck with encasement of the left common carotid and cervical ICA, while this may represent posttreatment change underlying recurrent neoplastic disease is not excluded she is, at a Sarah appearance to the supraglottic and glottic soft tissues with mild airway narrowing left heterogeneous appearance of the thyroid gland, thyroiditis not excluded, multiple cervical spondylolysis with severe for minimal stenosis, partially visualized air-fluid level within the distal esophagus as per radiology report.  CT of the chest performed on 6/1 showed patchy groundglass opacity and tree-in-bud nodularity in the right middle lobe and left lower lobe and mild groundglass opacity in the right lower lobe compatible with aspiration, extensive hyperdensity in the right middle lobe and both lower lobes compatible with aspirated barium, multiple mediastinal nodes normal in  size slightly increased number of uncertain etiology and significance as per radiology report.    Incidental Findings:   None       Hospital Course:   Kelly Lind is a 75 y.o. female patient PMH of parotid cancer status post parotidectomy, radiation and chemotherapy, dysphagia, HTN and hypothyroidism who originally presented to the hospital on 6/1/2025 due to dysphagia.  Patient has a history of dysphagia however it has worsened over the past 3 days to the point where she cannot keep anything down and is regurgitating everything.  In the ED CT of the chest was performed showing hyperdensity in the right lower lobe patchy groundglass opacity and tree-in-bud nodularity, right middle lobe multiple nodes normal in size however increased number of uncertain etiology and significance as per radiology report.  CT of the neck soft tissue showed postoperative changes left parotidectomy and radical neck dissection with flap construction asymmetric soft tissue thickening throughout the left neck with encasement of the left common carotid and cervical ICA, while this may present posttreatment change underlying recurrent neoplastic disease is not excluded as per radiology report.  ENT was consulted, performed bedside scope recommended PPI and steroid, GI consultation.  GI saw patient, performed EGD with dilatation.  Video barium swallow was performed and patient unfortunately demonstrates severe pharyngeal dysphagia, recommendations n.p.o. and PEG tube insertion.  Patient underwent PEG tube insertion on 6/4, tolerated procedure well.  Bolus tube feedings started on 6/5 on the recommendation of nutrition of Jevity 1.5 240 mL boluses 5 times a day along with H2O boluses 100 mL 5 times a day.  Also recommend pre and post bolus feeding flushes with 30 cc of H2O.  Case management consulted, supplies being delivered to patient's home for feedings.  Patient will be discharged home today, follow-up outpatient PCP 1 to 2 weeks  "postdischarge and patient is also advised to follow-up with her outpatient oncologist for potential PET scan as recommended by radiology. Patient is to follow up outpatient GI for peg tube check.Contact information given to patient for ENT Dr. Vazquez for patient to follow up outpatient. Patient is discharged to home today.           Please see above list of diagnoses and related plan for additional information.     Discharge Day Visit / Exam:   Subjective: Patient seen sitting up in bed resting comfortably.  Reports that she slept well last night.  No pain.  PEG tube present, capped.  Denies any discomfort with it.  No fevers or chills.    Vitals: Blood Pressure: 143/84 (06/05/25 0730)  Pulse: 78 (06/05/25 0730)  Temperature: 97.9 °F (36.6 °C) (06/05/25 0730)  Temp Source: Temporal (06/04/25 1617)  Respirations: 19 (06/05/25 0730)  Height: 5' 10\" (177.8 cm) (06/01/25 1423)  Weight - Scale: 66 kg (145 lb 8 oz) (06/01/25 1423)  SpO2: 91 % (06/05/25 0730)    Physical Exam  Vitals and nursing note reviewed.   Constitutional:       Appearance: Normal appearance.   HENT:      Head: Normocephalic.      Nose: Nose normal.      Mouth/Throat:      Mouth: Mucous membranes are moist.     Eyes:      Extraocular Movements: Extraocular movements intact.      Conjunctiva/sclera: Conjunctivae normal.      Pupils: Pupils are equal, round, and reactive to light.     Neck:      Comments: Scarring on left side of neck; some limited ROM noted  Cardiovascular:      Rate and Rhythm: Normal rate and regular rhythm.      Pulses: Normal pulses.   Pulmonary:      Effort: Pulmonary effort is normal.      Breath sounds: Normal breath sounds.   Abdominal:      Palpations: Abdomen is soft.      Comments: Peg tube present; clamped    Genitourinary:     Comments: Voiding spontaneously    Musculoskeletal:         General: Normal range of motion.     Skin:     General: Skin is warm and dry.      Capillary Refill: Capillary refill takes less than 2 " seconds.     Neurological:      General: No focal deficit present.      Mental Status: She is alert and oriented to person, place, and time.     Psychiatric:         Mood and Affect: Mood normal.         Behavior: Behavior normal.         Thought Content: Thought content normal.         Judgment: Judgment normal.          Discussion with Family: Patient indicated she would update her family .     Discharge instructions/Information to patient and family:   See after visit summary for information provided to patient and family.      Provisions for Follow-Up Care:  See after visit summary for information related to follow-up care and any pertinent home health orders.      Mobility at time of Discharge:   Basic Mobility Inpatient Raw Score: 24  JH-HLM Goal: 8: Walk 250 feet or more  JH-HLM Achieved: 8: Walk 250 feet ot more  HLM Goal achieved. Continue to encourage appropriate mobility.     Disposition:   Home    Planned Readmission:  No     Administrative Statements   Discharge Statement:  I have spent a total time of greater than 45 minutes in caring for this patient on the day of the visit/encounter. >30 minutes of time was spent on: Diagnostic results, Risks and benefits of tx options, Instructions for management, Patient and family education, Impressions, Counseling / Coordination of care, Documenting in the medical record, Reviewing / ordering tests, medicine, procedures  , and Communicating with other healthcare professionals .    **Please Note: This note may have been constructed using a voice recognition system**

## 2025-06-05 NOTE — DISCHARGE INSTR - AVS FIRST PAGE
You were admitted to the hospital due to difficulty swallowing    ENT saw you, did a bedside scope, recommendation was made for GI to see you also.    GI performed EGD with dilatation    We did a video barium swallow study which still demonstrated that you had pharyngeal dysphagia, recommendation was for a PEG tube placement and nothing to eat or drink by mouth    Your PEG tube was successfully placed on 6/4, recommendation from nutrition is Jevity 1.5,  240 mL 5 times daily along with water flushes 100 mL 5 times daily.  The recommendation is also for 30 mL of water pre and post tube feeding flushes to ensure that the tube remains patent.    Case management made arrangements with your insurance company for delivery of tube feeds to your home along with supplies for your PEG tube.    Please follow-up with your PCP 1 to 2 weeks postdischarge    Your CAT scan of the neck showed some thickening of tissue which could be postoperative changes, however recommending following up with your outpatient oncologist for further imaging with a PET scan to ensure no return of the cancer.    It was a pleasure taking care of you during your hospitalization!

## 2025-06-05 NOTE — ASSESSMENT & PLAN NOTE
Home regimen includes fosinopril and Dyazide, currently held due to dysphagia/NPO status  BP acceptable  Home medications resumed via PEG tube as per discussion with GI

## 2025-06-05 NOTE — ASSESSMENT & PLAN NOTE
75 y.o. female with past medical history of parotid cancer s/p parotidectomy/radiation/chemotherapy, dysphagia, hypertension, hypothyroidism who presents with worsening dysphagia.  Patient reported on admission that she has had worsening dysphagia over the last 3 to 4 days at the point where she is unable to keep any food down.ENT did see patient and laryngotomy showed edematous supraglottic tissue and she was started on IV steroids and speech therapy was consulted.  Patient has history of mild oral stage dysphagia and significant pharyngeal dysphagia seen on video barium swallow 11/6/2023.      CT soft tissue neck showed edematous appearance to the supraglottic and glottic soft tissue with mild airway narrowing.  Partially visualized air-fluid level within the distal esophagus.  Multilevel cervical spondylosis with severe foraminal stenosis.   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  Dilation caused mucosal tears and improved lumen appearance; post dilation mucosal tears were superficial.  Stomach and duodenum normal  Continue pantoprazole IV twice daily  Continue IV steroids per ENT  Patient failed video barium swallow with speech.  EGD 6/5:Stricture noted in cricopharynx scope was passed with moderate resistance. Linear ulceration from recent dilation noted. PEG tube 20 Kinyarwanda successfully placed in stomach on 6/4.   Daily PEG tube cleaning and dressing change.  Okay  Okay to give bolus feeds Jevity 1.5 240 mL 5 times daily, 100 mL H2O flush 5 times a day and 30 mL H2O flush before and after per nutritional recommendations.  Follow up with GI    Recommend follow up with ENT as outpatient  Okay to discharge from GI standpoint

## 2025-06-05 NOTE — DISCHARGE INSTR - DIET
Plus continue frequent oral care (minimize oral bacteria to min risk for contamination of saliva and risk for aspiration pneumonia/medical consequence)

## 2025-06-05 NOTE — PROGRESS NOTES
Progress Note - Gastroenterology   Name: Kelly Lind 75 y.o. female I MRN: [id number]  Unit/Bed#: [address] [place] [address] Date of Admission: 6/1/2025   Date of Service: 6/5/2025 I Hospital Day: 4    Assessment & Plan  Dysphagia  75 y.o. female with past medical history of parotid cancer s/p parotidectomy/radiation/chemotherapy, dysphagia, hypertension, hypothyroidism who presents with worsening dysphagia.  Patient reported on admission that she has had worsening dysphagia over the last 3 to 4 days at the point where she is unable to keep any food down.ENT did see patient and laryngotomy showed edematous supraglottic tissue and she was started on IV steroids and speech therapy was consulted.  Patient has history of mild oral stage dysphagia and significant pharyngeal dysphagia seen on video barium swallow 11/6/2023.      CT soft tissue neck showed edematous appearance to the supraglottic and glottic soft tissue with mild airway narrowing.  Partially visualized air-fluid level within the distal esophagus.  Multilevel cervical spondylosis with severe foraminal stenosis.   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  Dilation caused mucosal tears and improved lumen appearance; post dilation mucosal tears were superficial.  Stomach and duodenum normal  Continue pantoprazole IV twice daily  Continue IV steroids per ENT  Patient failed video barium swallow with speech.  EGD 6/5:Stricture noted in cricopharynx scope was passed with moderate resistance. Linear ulceration from recent dilation noted. PEG tube 20 Thai successfully placed in stomach on 6/4.   Daily PEG tube cleaning and dressing change.  Okay  Okay to give bolus feeds Jevity 1.5 240 mL 5 times daily, 100 mL H2O flush 5 times a day and 30 mL H2O flush before and after per nutritional recommendations.  Follow up with GI    Recommend follow up with ENT as outpatient  Okay to discharge from GI  standpoint      Subjective   Out of bed sitting in chair.  PEG tube site without redness or drainage.  Patient receive her bolus feeding via PEG tube tolerating well. Denies nausea, vomiting, or abdominal pain.     Objective :  Temp:  [97.9 °F (36.6 °C)-98.9 °F (37.2 °C)] 97.9 °F (36.6 °C)  HR:  [] 78  BP: (141-176)/(74-96) 143/84  Resp:  [16-21] 19  SpO2:  [91 %-96 %] 91 %  O2 Device: None (Room air)    Physical Exam  Vitals and nursing note reviewed.   Constitutional:       General: She is not in acute distress.     Appearance: She is well-developed.   HENT:      Head: Normocephalic and atraumatic.     Eyes:      Conjunctiva/sclera: Conjunctivae normal.       Cardiovascular:      Rate and Rhythm: Normal rate and regular rhythm.      Pulses: Normal pulses.      Heart sounds: Normal heart sounds. No murmur heard.  Pulmonary:      Effort: Pulmonary effort is normal. No respiratory distress.      Breath sounds: Normal breath sounds. No stridor. No wheezing, rhonchi or rales.   Abdominal:      General: Bowel sounds are normal. There is no distension.      Palpations: Abdomen is soft. There is no mass.      Tenderness: There is no abdominal tenderness. There is no guarding or rebound.     Musculoskeletal:         General: No swelling.      Cervical back: Neck supple.      Right lower leg: No edema.      Left lower leg: No edema.     Skin:     General: Skin is warm and dry.      Capillary Refill: Capillary refill takes less than 2 seconds.      Coloration: Skin is not jaundiced or pale.     Neurological:      Mental Status: She is alert and oriented to person, place, and time.     Psychiatric:         Mood and Affect: Mood normal.         Lab Results: I have reviewed the following results:CBC/BMP:   .     06/05/25  0448   WBC 7.41   HGB 13.8   HCT 42.0      SODIUM 134*   K 3.5   CL 95*   CO2 23   BUN 15   CREATININE 0.61   GLUC 81        Imaging Results Review: I reviewed radiology reports from this  admission including: CT soft tissue neck with contrast, barium swallow video with speech,, CT head, and procedure reports.

## 2025-06-10 ENCOUNTER — APPOINTMENT (OUTPATIENT)
Dept: LAB | Facility: CLINIC | Age: 75
End: 2025-06-10
Attending: NURSE PRACTITIONER
Payer: MEDICARE

## 2025-06-10 DIAGNOSIS — R13.10 DYSPHAGIA: ICD-10-CM

## 2025-06-10 LAB
ANION GAP SERPL CALCULATED.3IONS-SCNC: 8 MMOL/L (ref 4–13)
BUN SERPL-MCNC: 17 MG/DL (ref 5–25)
CALCIUM SERPL-MCNC: 9.4 MG/DL (ref 8.4–10.2)
CHLORIDE SERPL-SCNC: 91 MMOL/L (ref 96–108)
CO2 SERPL-SCNC: 39 MMOL/L (ref 21–32)
CREAT SERPL-MCNC: 0.66 MG/DL (ref 0.6–1.3)
GFR SERPL CREATININE-BSD FRML MDRD: 87 ML/MIN/1.73SQ M
GLUCOSE P FAST SERPL-MCNC: 106 MG/DL (ref 65–99)
POTASSIUM SERPL-SCNC: 4.4 MMOL/L (ref 3.5–5.3)
SODIUM SERPL-SCNC: 138 MMOL/L (ref 135–147)

## 2025-06-10 PROCEDURE — 36415 COLL VENOUS BLD VENIPUNCTURE: CPT

## 2025-06-10 PROCEDURE — 80048 BASIC METABOLIC PNL TOTAL CA: CPT

## 2025-06-12 ENCOUNTER — OFFICE VISIT (OUTPATIENT)
Dept: FAMILY MEDICINE CLINIC | Facility: CLINIC | Age: 75
End: 2025-06-12
Payer: MEDICARE

## 2025-06-12 ENCOUNTER — PATIENT OUTREACH (OUTPATIENT)
Dept: HEMATOLOGY ONCOLOGY | Facility: CLINIC | Age: 75
End: 2025-06-12

## 2025-06-12 ENCOUNTER — DOCUMENTATION (OUTPATIENT)
Dept: HEMATOLOGY ONCOLOGY | Facility: CLINIC | Age: 75
End: 2025-06-12

## 2025-06-12 VITALS
TEMPERATURE: 98.5 F | BODY MASS INDEX: 20.87 KG/M2 | SYSTOLIC BLOOD PRESSURE: 126 MMHG | HEIGHT: 70 IN | DIASTOLIC BLOOD PRESSURE: 80 MMHG | WEIGHT: 145.8 LBS | RESPIRATION RATE: 16 BRPM | HEART RATE: 84 BPM

## 2025-06-12 DIAGNOSIS — J69.0 ASPIRATION PNEUMONITIS (HCC): ICD-10-CM

## 2025-06-12 DIAGNOSIS — E87.1 HYPONATREMIA: ICD-10-CM

## 2025-06-12 DIAGNOSIS — Z93.1 PEG (PERCUTANEOUS ENDOSCOPIC GASTROSTOMY) STATUS (HCC): ICD-10-CM

## 2025-06-12 DIAGNOSIS — R13.12 OROPHARYNGEAL DYSPHAGIA: ICD-10-CM

## 2025-06-12 DIAGNOSIS — I10 ESSENTIAL HYPERTENSION: ICD-10-CM

## 2025-06-12 DIAGNOSIS — C07 CANCER OF PAROTID GLAND (HCC): Primary | ICD-10-CM

## 2025-06-12 PROCEDURE — 99496 TRANSJ CARE MGMT HIGH F2F 7D: CPT | Performed by: INTERNAL MEDICINE

## 2025-06-12 NOTE — PROGRESS NOTES
Oncology Nurse Navigator outreach attempted in response to referral received to medical oncology. Noted that patient has a history of cancer treatment at [facility] around 2010. I left VM explaining my role and reason for my call. I included my direct number, [phone], and requested a return call.

## 2025-06-12 NOTE — ASSESSMENT & PLAN NOTE
"By history. CT scan shows \" Asymmetric soft tissue thickening throughout the left neck with encasement of the left common carotid and cervical ICA. While this may represent posttreatment change, underlying recurrent neoplastic disease is not excluded.\"  I ordered her PET scan in preparation for new patient appointment with oncology at [facility].   Orders:  •  Ambulatory Referral to Hematology / Oncology; Future  •  NM PET CT skull base to mid thigh; Future  "

## 2025-06-12 NOTE — ASSESSMENT & PLAN NOTE
She is not taking anything by mouth and continues with tube feedings, states she is doing okay with these.   Orders:  •  NM PET CT skull base to mid thigh; Future

## 2025-06-12 NOTE — PROGRESS NOTES
PN please retrieve outside records.    I reached out to the patient and left VM. CAMERON not yet discussed. Will update when contact made.    Pt referred to Hem/Onc and should be scheduled within 3 days of imaging being completed.  Please notify me if not scheduled within time frame.    Referral received/ Chart reviewed for work up completed     [facility]   [] PET/CT   [] MRI   [x] CT soft tissue neck and chest 6/01/25   [] US   [] Mammo   [] Bone scan   [] Barium Swallow 6/03/25    Pathology completed:    Date: 2010 or 2011   Location: [facility]- slides may be unavailable. Path report helpful.    []N/A    Additional records needed:    [] Genomic report   [] Genetic testing results   [x] Office Note   [x] Procedure/ Operative note   [x] Lab results- Path report   [] N/A      [] Radiation Oncology records retrieval needed (PN to route to rad/onc clerical pool once scheduled)  Date: 2011  Location: [facility]

## 2025-06-12 NOTE — PROGRESS NOTES
"Transition of Care Visit:  Name: Kelly Lind      : 1950      MRN: [id number]  Encounter Provider: Rubi Leo MD  Encounter Date: 2025   Encounter department: [facility]    Assessment & Plan  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(HCC)  By history. CT scan shows \" Asymmetric soft tissue thickening throughout the left neck with encasement of the left common carotid and cervical ICA. While this may represent posttreatment change, underlying recurrent neoplastic disease is not excluded.\"  I ordered her PET scan in preparation for new patient appointment with oncology at [facility].   Orders:  •  Ambulatory Referral to Hematology / Oncology; Future  •  NM PET CT skull base to mid thigh; Future    Oropharyngeal dysphagia  She is not taking anything by mouth and continues with tube feedings, states she is doing okay with these.   Orders:  •  NM PET CT skull base to mid thigh; Future    Aspiration pneumonitis (HCC)  Resolved.  Orders:  •  NM PET CT skull base to mid thigh; Future    PEG (percutaneous endoscopic gastrostomy) status (HCC)  Continue tube feedings as established.       Essential hypertension  Well controlled and will continue current medications through the PEG tube.        Hyponatremia  Reviewed labwork post discharge and sodium is now normal.             History of Present Illness     Transitional Care Management Review:   Kelly Lind is a 75 y.o. female here for TCM follow up.     During the TCM phone call patient stated:  TCM Call (since 2025)     Date and time call was made  2025  4:42 PM    Hospital care reviewed  Records reviewed    Patient was hospitialized at  [facility]    Date of Admission  25    Date of discharge  25    Diagnosis  Dysphagia    Disposition  Home    Were the patients medications reviewed and updated  Yes    Current Symptoms  None      TCM Call (since 2025)     Post hospital issues  None    Scheduled for follow up?  Yes    " Patients specialists  Other (comment)    Other specialists names  Jovan Dai MD (Gastroenterology,Keagan Vazquez MD (Otolaryngology)    Did you obtain your prescribed medications  Yes    Do you need help managing your prescriptions or medications  No    Is transportation to your appointment needed  No    I have advised the patient to call PCP with any new or worsening symptoms  Marie pulido [facility]    Living Arrangements  Alone    Support System  Children; Family; Neighboors; Friends    The type of support provided  Emotional; Physical; Financial    Do you have social support  Yes, as much as I need    Are you recieving home care services  No        As per TCM.   She was admitted 6/1 for dysphagia relating to previous parotid cancer, surgery, XRT.  She now has a PEG for bolus feedings.  She had a PEG temporarily after her initial surgery so is comfortable with this.  Denies fever or discharge from around site since she has been home.  Has follow up scheduled with GI.  Had repeat labwork due to hyponatremia that was felt to be secondary to dehydration.  She is doing well with the feedings and with her medications through the tube.  Her oncologist is at [facility], but she would like an oncologist closer to here. She was also recommended a PET scan due to changes seen on CT.        Review of Systems   Constitutional:  Positive for unexpected weight change. Negative for chills and fever.   HENT:  Negative for ear pain and sore throat.    Eyes:  Negative for pain and visual disturbance.   Respiratory:  Negative for cough and shortness of breath.    Cardiovascular:  Negative for chest pain and palpitations.        Not taking anything PO.   Gastrointestinal:  Negative for abdominal pain, diarrhea, nausea and vomiting.   Genitourinary:  Negative for dysuria and hematuria.   Musculoskeletal:  Negative for arthralgias and back pain.   Skin:  Negative for color change and rash.   Neurological:  Negative for  "seizures and syncope.   All other systems reviewed and are negative.    Objective   /80   Pulse 84   Temp 98.5 °F (36.9 °C)   Resp 16   Ht 5' 10\" (1.778 m)   Wt 66.1 kg (145 lb 12.8 oz)   BMI 20.92 kg/m²     Physical Exam  Constitutional:       General: She is not in acute distress.     Appearance: She is well-developed. She is not diaphoretic.   HENT:      Head: Normocephalic and atraumatic.      Right Ear: External ear normal.      Left Ear: External ear normal.      Nose: Nose normal.     Eyes:      Pupils: Pupils are equal, round, and reactive to light.     Neck:      Thyroid: No thyromegaly.      Vascular: No JVD.     Cardiovascular:      Rate and Rhythm: Regular rhythm.      Heart sounds: No murmur heard.     No friction rub. No gallop.   Pulmonary:      Effort: Pulmonary effort is normal.      Breath sounds: Normal breath sounds. No wheezing or rales.   Abdominal:      General: Bowel sounds are normal. There is no distension.      Palpations: Abdomen is soft.      Tenderness: There is no abdominal tenderness.     Musculoskeletal:         General: Normal range of motion.      Cervical back: Normal range of motion and neck supple.     Skin:     General: Skin is warm and dry.      Findings: No rash.      Comments: PEG site pink with no discharge or erythema     Neurological:      Mental Status: She is alert and oriented to person, place, and time.      Cranial Nerves: No cranial nerve deficit.      Sensory: No sensory deficit.      Motor: No abnormal muscle tone.      Coordination: Coordination normal.      Deep Tendon Reflexes: Reflexes normal.     Psychiatric:         Behavior: Behavior normal.         Thought Content: Thought content normal.         Judgment: Judgment normal.       Medications have been reviewed by provider in current encounter      "

## 2025-06-13 ENCOUNTER — DOCUMENTATION (OUTPATIENT)
Dept: HEMATOLOGY ONCOLOGY | Facility: CLINIC | Age: 75
End: 2025-06-13

## 2025-06-13 NOTE — PROGRESS NOTES
Oncology Nurse Navigator 2nd outreach attempted in response to referral received to medical oncology. Noted that she is now scheduled to consult with Dr. Carlson in the [place], [place] office on 7/03/25 after her PET is completed which is right on track. We looked into obtaining some records from her previous Tx and were unsuccessful. I asked that she give me a call back to provide any additional insight that may help. I left  explaining my role and reason for my call. I included my direct number, [phone], and requested a return call.

## 2025-06-13 NOTE — PROGRESS NOTES
Call placed to [facility]  at [phone] and spoke to Lizzie who stated they have no records or path slides on Kelly. I relayed message to VANDANA Garcia.

## 2025-06-13 NOTE — PROGRESS NOTES
Received return call from Kelly. Khushboo informed me that she received Tx in 2010 at [facility] prior to being  so her records will be under her maiden name. I sent CAMERON form via email. She will fill this out including her name at time of Tx and send it back so that we can attempt to obtain records.     We reviewed for any barriers to care. She is doing well at this time. She is able to drive and denies any transportation issues. She now has a feeding tube that was placed while she was admitted. She is doing well with this. She shared that she had one during her previous Tx so this is a familiar task. She is currently following with the GI office and is scheduled to meet with dr. Carlson in medical oncology on 7/01 after she has her PET completed on 6/26.

## 2025-06-16 ENCOUNTER — PATIENT OUTREACH (OUTPATIENT)
Dept: HEMATOLOGY ONCOLOGY | Facility: CLINIC | Age: 75
End: 2025-06-16

## 2025-06-16 NOTE — PROGRESS NOTES
Outreach made to Kelly. I asked if she received the CAMERON that her NN Radha sent over and to get more information on where she went so I am able to obtain the necessary medical records for her med onc consult with us.  She stated she did receive the CAMERON and will fill it out. I asked if Kelly could provide me her samaria name so I am able to request records as this is not within her chart. She stated it was Kelly Richard. I asked if she knew the exact location she went to so I am able to request office notes. She stated she is unsure of the exact location and doctor name as it has been years. I also f/u with VANDANA Garcia to see if she knew of the exact facility and doctor so I can request records. I then asked patient if she had any documentation or office notes with her from when she got tx. She stated she did not. I will relay VANDANA Garcia on the above message .       Outreach made to [facility] pathology and spoke to Lizzie who stated she will fax over Kelly's pathology reports from 2010 and 2011 right now. She stated she will see if she can obtain the slides. I provided Lizzie with my direct contact information [phone] and fax number [phone]. Lizzie stated I can also fax my request to [phone]     Outreach made to .[facility] oncology at [phone]. I spoke to Isabel he stated that they do not have any oncology  notes for Kelly. I will relay information to VANDANA Garcia.       Confirmed that path report has been faxed and it was sent to [facility] to be uploaded to her chart.       Currently scheduled with Dr. Carlson on 7/3/25

## 2025-06-16 NOTE — PROGRESS NOTES
Outreach to Kelly to express that I no longer need her to sign the CAMERON form due to [facility] pathology faxing over her 2010/2011 reports already. She stated that is wonderful and thanked me for all of my help and guidance.

## 2025-06-26 ENCOUNTER — HOSPITAL ENCOUNTER (OUTPATIENT)
Dept: RADIOLOGY | Age: 75
Discharge: HOME | End: 2025-06-26
Attending: INTERNAL MEDICINE
Payer: MEDICARE

## 2025-06-26 DIAGNOSIS — J69.0 ASPIRATION PNEUMONITIS (HCC): ICD-10-CM

## 2025-06-26 DIAGNOSIS — R13.12 OROPHARYNGEAL DYSPHAGIA: ICD-10-CM

## 2025-06-26 DIAGNOSIS — C07 CANCER OF PAROTID GLAND (HCC): ICD-10-CM

## 2025-06-26 LAB — GLUCOSE SERPL-MCNC: 91 MG/DL (ref 65–140)

## 2025-06-26 PROCEDURE — A9552 F18 FDG: HCPCS

## 2025-06-26 PROCEDURE — 78815 PET IMAGE W/CT SKULL-THIGH: CPT

## 2025-06-26 PROCEDURE — 82948 REAGENT STRIP/BLOOD GLUCOSE: CPT

## 2025-06-30 ENCOUNTER — DOCUMENTATION (OUTPATIENT)
Dept: HEMATOLOGY ONCOLOGY | Facility: CLINIC | Age: 75
End: 2025-06-30

## 2025-06-30 NOTE — PROGRESS NOTES
Keith Cam at pathology:       Good Morning Chanelle,    I have slides from the [facility] that you requested for patient Kelly Richard ( 1950).  This patient is not in our system. Are we still going to do a consult on her.     Thanks  Dorina Murrieta  Administrative Support  [facility]  [address] [place] [id number]  Phone: [phone]  Fax: [phone]        I sent a email back to Dorina stating:     Bladimir Cam,    That is her maiden name her name within epic is Kelly Smythomon 52. She has a consult with Dr. Carlson on 7/3/25

## 2025-07-01 ENCOUNTER — LAB REQUISITION (OUTPATIENT)
Dept: LAB | Facility: HOSPITAL | Age: 75
End: 2025-07-01
Payer: MEDICARE

## 2025-07-01 DIAGNOSIS — C07 MALIGNANT NEOPLASM OF PAROTID GLAND (HCC): ICD-10-CM

## 2025-07-03 ENCOUNTER — CONSULT (OUTPATIENT)
Dept: HEMATOLOGY ONCOLOGY | Facility: MEDICAL CENTER | Age: 75
End: 2025-07-03
Attending: INTERNAL MEDICINE
Payer: MEDICARE

## 2025-07-03 VITALS
SYSTOLIC BLOOD PRESSURE: 124 MMHG | TEMPERATURE: 98.1 F | OXYGEN SATURATION: 99 % | BODY MASS INDEX: 20.76 KG/M2 | HEART RATE: 80 BPM | WEIGHT: 145 LBS | RESPIRATION RATE: 16 BRPM | DIASTOLIC BLOOD PRESSURE: 74 MMHG | HEIGHT: 70 IN

## 2025-07-03 DIAGNOSIS — C07 CANCER OF PAROTID GLAND (HCC): ICD-10-CM

## 2025-07-03 PROCEDURE — 99203 OFFICE O/P NEW LOW 30 MIN: CPT | Performed by: INTERNAL MEDICINE

## 2025-07-03 NOTE — PROGRESS NOTES
Name: Kelly Lind      : 1950      MRN: [id number]  Encounter Provider: Nam Carlson MD  Encounter Date: 7/3/2025   Encounter department: [facility] [place]  :  35 minutes was spent with this new patient in direct consultation, physical examination and review of the chart.  Assessment & Plan  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(HCC)    Orders:    Ambulatory Referral to Hematology / Oncology    75-year-old female with history of parotid gland tumor approximately 20 years ago (specifics on the pathology and treatment are not presently available).  More recently patient has had issues with swallowing and has lost approximately 50 pounds.  Recent PET/CT does not demonstrate any evidence for recurrence.    Patient will follow-up with ENT as directed.  Patient will continue with the feeding tube and monitor her calorie count.  No pain control issues at this time.      As above, the recent PET/CT did not demonstrate any evidence for recurrence.  No additional hematology/oncology workup needed at this time.  Patient was given 1 follow-up appointment in 3 months just to make sure everything has been addressed.  Mrs. Lind knows to call if she has any other questions or concerns.    Carefully review your medication list and verify that the list is accurate and up-to-date. Please call the hematology/oncology office if there are medications missing from the list, medications on the list that you are not currently taking or if there is a dosage or instruction that is different from how you're taking that medication.     Patient goals and areas of care: Follow-up with PCP, ENT  Barriers to care: None  Patient is able to self-care  _____________________________________________________________________________  History of Present Illness   HPI: Kelly Lind is a 75 y.o. female who presents with a history of a parotid gland tumor.    Patient was found to have a left parotid gland tumor  "approximately 15 years ago.  Patient was treated at [facility].  Treatment included resection, radiation and chemotherapy.  Since that time, there has been no evidence for recurrence.  Patient was previously followed by Dr. Neal, [facility].    More recently patient has had problems with weight loss, approximately 50 pounds, trouble swallowing, trouble eating, generalized body aches.  Patient now with a feeding tube.  Patient is being evaluated by ENT.    Patient states feeling +/-, about the same as before.  No epistaxis, no oral bleeding.  No problems with the feeding tube.  No fevers or signs of infection.  No other GI,  or GYN issues.  Activities are limited as compared to before.    Past medical history: Up-to-date    Past surgical history: No prior blood transfusions    Family history: No familial or genetic diseases, daughter with history of breast cancer, patient states that her prior genetics workup did not demonstrate any specific abnormality    Social history: No toxic exposure, no tobacco, alcohol or drug abuse    Review of Systems   Constitutional:  Positive for fatigue.   HENT: Negative.     Eyes: Negative.    Respiratory: Negative.     Cardiovascular: Negative.    Gastrointestinal: Negative.    Endocrine: Negative.    Genitourinary: Negative.    Musculoskeletal: Negative.    Skin: Negative.    Allergic/Immunologic: Negative.    Neurological: Negative.    Hematological: Negative.    Psychiatric/Behavioral: Negative.     All other systems reviewed and are negative.    Objective   /74 (BP Location: Right arm, Patient Position: Sitting, Cuff Size: Adult)   Pulse 80   Temp 98.1 °F (36.7 °C) (Temporal)   Resp 16   Ht 5' 10\" (1.778 m)   Wt 65.8 kg (145 lb)   SpO2 99%   BMI 20.81 kg/m²   Physical Exam  Constitutional:       Appearance: She is well-developed.   HENT:      Head: Normocephalic and atraumatic.      Right Ear: External ear normal.      Left Ear: External ear normal.     Eyes:     "  Conjunctiva/sclera: Conjunctivae normal.      Pupils: Pupils are equal, round, and reactive to light.     Neck:      Comments: Large irregular scar on left neck, skin is very hard and indurated (patient states that it has been this way for many years) no obvious, palpable masses, no palpable adenopathy  Cardiovascular:      Rate and Rhythm: Normal rate and regular rhythm.      Heart sounds: Normal heart sounds.   Pulmonary:      Effort: Pulmonary effort is normal.      Breath sounds: Normal breath sounds.      Comments: Fair to good entry bilaterally, clear  Abdominal:      General: Bowel sounds are normal.      Palpations: Abdomen is soft.      Comments: Feeding tube in place, base clean and dry     Musculoskeletal:         General: Normal range of motion.      Cervical back: Normal range of motion and neck supple.     Skin:     General: Skin is warm.     Neurological:      Mental Status: She is alert and oriented to person, place, and time.      Deep Tendon Reflexes: Reflexes are normal and symmetric.     Psychiatric:         Behavior: Behavior normal.         Thought Content: Thought content normal.         Judgment: Judgment normal.     Extremities: No lower extremity edema, no cords, pulses are 1+  Lymphatics: No adenopathy in the neck, supraclavicular region, axilla bilaterally    Laboratory    6/10/2025 WBC = 7.41 hemoglobin = 13.8 hematocrit = 42 platelet = 222 BUN = 17 creatinine = 0.66 calcium = 9.4    Imaging    6/26/2025 PET/CT    IMPRESSION:  1.  Mild asymmetric focus of uptake at the left posterolateral oropharynx, nonspecific. No suspicious findings on CT. This is nonspecific and could be inflammatory but correlate with direct visualization.  2.  Otherwise no suspicious foci of uptake in the left neck.  3.  Asymmetrically increased radiotracer uptake in the left lobe of the thyroid compared to the right. Findings are nonspecific and may be related to thyroiditis.  4.  Mild patchy radiotracer uptake  in the left lower lobe where there is patchy groundglass opacity on CT similar to recent chest CT examination. Again this may be postinfectious or inflammatory. Partial clearing of previously seen patchy opacity in the   right lung.  5.  No findings for hypermetabolic metastasis to the abdomen or pelvis.    Pathology    The original pathology results are not presently available

## 2025-07-07 ENCOUNTER — PATIENT OUTREACH (OUTPATIENT)
Dept: HEMATOLOGY ONCOLOGY | Facility: CLINIC | Age: 75
End: 2025-07-07

## 2025-07-07 NOTE — PROGRESS NOTES
I reached out to Kelly to complete the Distress Thermometer, review for any barriers to care and to offer any supportive services that may be needed. I left  with the reason for my call including my direct phone number [phone].

## 2025-07-08 ENCOUNTER — PATIENT OUTREACH (OUTPATIENT)
Dept: HEMATOLOGY ONCOLOGY | Facility: CLINIC | Age: 75
End: 2025-07-08

## 2025-07-08 NOTE — PROGRESS NOTES
I reached out to Kelly to complete the Distress Thermometer, review for any barriers to care and to offer any supportive services that may be needed. I left  with the reason for my call including my direct phone number [phone].

## 2025-07-09 ENCOUNTER — PATIENT OUTREACH (OUTPATIENT)
Dept: HEMATOLOGY ONCOLOGY | Facility: CLINIC | Age: 75
End: 2025-07-09

## 2025-07-09 PROCEDURE — 88321 CONSLTJ&REPRT SLD PREP ELSWR: CPT | Performed by: STUDENT IN AN ORGANIZED HEALTH CARE EDUCATION/TRAINING PROGRAM

## 2025-07-09 NOTE — PROGRESS NOTES
I reached out to Kelly to complete the Distress Thermometer, review for any barriers to care and to offer any supportive services that may be needed. I left  with the reason for my call including my direct phone number [phone]

## 2025-07-09 NOTE — PROGRESS NOTES
Patient outreach made   3x   to complete DT and  to review for any barriers to care so I can offer any supportive services that        may need. I left detailed VM with the reason for my call including my direct contact number. I have not heard back therefore I will remove myself from care team but will remain available if patient calls back or needs my assistance.

## 2025-07-16 DIAGNOSIS — I10 ESSENTIAL HYPERTENSION: ICD-10-CM

## 2025-07-17 RX ORDER — TRIAMTERENE AND HYDROCHLOROTHIAZIDE 37.5; 25 MG/1; MG/1
CAPSULE ORAL
Qty: 90 CAPSULE | Refills: 1 | Status: SHIPPED | OUTPATIENT
Start: 2025-07-17

## 2025-07-22 ENCOUNTER — OFFICE VISIT (OUTPATIENT)
Dept: GASTROENTEROLOGY | Facility: CLINIC | Age: 75
End: 2025-07-22
Payer: MEDICARE

## 2025-07-22 ENCOUNTER — TELEPHONE (OUTPATIENT)
Age: 75
End: 2025-07-22

## 2025-07-22 VITALS
SYSTOLIC BLOOD PRESSURE: 108 MMHG | BODY MASS INDEX: 20.84 KG/M2 | DIASTOLIC BLOOD PRESSURE: 66 MMHG | HEIGHT: 70 IN | WEIGHT: 145.6 LBS | TEMPERATURE: 98 F

## 2025-07-22 DIAGNOSIS — K22.2 ESOPHAGEAL STRICTURE: ICD-10-CM

## 2025-07-22 DIAGNOSIS — Z93.1 PEG (PERCUTANEOUS ENDOSCOPIC GASTROSTOMY) STATUS (HCC): ICD-10-CM

## 2025-07-22 DIAGNOSIS — C07 CANCER OF PAROTID GLAND (HCC): ICD-10-CM

## 2025-07-22 DIAGNOSIS — R13.10 DYSPHAGIA, UNSPECIFIED TYPE: Primary | ICD-10-CM

## 2025-07-22 DIAGNOSIS — J39.2: ICD-10-CM

## 2025-07-22 DIAGNOSIS — K22.2 ESOPHAGEAL STRICTURE: Primary | ICD-10-CM

## 2025-07-22 PROCEDURE — G2211 COMPLEX E/M VISIT ADD ON: HCPCS | Performed by: INTERNAL MEDICINE

## 2025-07-22 PROCEDURE — 99214 OFFICE O/P EST MOD 30 MIN: CPT | Performed by: INTERNAL MEDICINE

## 2025-07-22 RX ORDER — SODIUM CHLORIDE, SODIUM LACTATE, POTASSIUM CHLORIDE, CALCIUM CHLORIDE 600; 310; 30; 20 MG/100ML; MG/100ML; MG/100ML; MG/100ML
125 INJECTION, SOLUTION INTRAVENOUS CONTINUOUS
Status: CANCELLED | OUTPATIENT
Start: 2025-07-22

## 2025-07-22 NOTE — PROGRESS NOTES
Name: Kelly Lind      : 1950      MRN: [id number]  Encounter Provider: Prabhu Harrison MD  Encounter Date: 2025   Encounter department: [facility] [place]  :  Assessment & Plan  Dysphagia, unspecified type  Patient has ongoing symptoms.  She reports increased phlegm collection in the back of her throat that she needs to spit out occasionally.  She has critical pharyngeal and upper esophageal stricture which was dilated in the past.  It was recommended that she get repeat EGD with dilation with triamcinolone.  I will order the procedure today to be done with advanced endoscopy.  She would also benefit from ENT referral given she has pharyngeal disease as well causing her dysphagia symptoms.  She is agreeable and referral to ENT was made.  I reached out to the nutrition team and requested them to contact the patient for the management of tube feeds and the feeding tube.  Orders:    Ambulatory Referral to Otolaryngology; Future    EGD Dilation; Future    Esophageal stricture  As above.  Orders:    Ambulatory Referral to Otolaryngology; Future    EGD Dilation; Future    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(HCC)  As above.  Orders:    Ambulatory Referral to Otolaryngology; Future    EGD Dilation; Future    PEG (percutaneous endoscopic gastrostomy) status (HCC)  As above.       Pharynx disease  As above.    Prabhu Harrison MD  Gastroenterology  [facility]  Date: 2025    Orders:    Ambulatory Referral to Otolaryngology; Future        History of Present Illness   HPI  Kelly Lind is a 75 y.o. female with upper esophageal stricture, cricopharyngeal stricture, dysphagia status post PEG tube placed, parotid cancer status post parotidectomy status post chemoradiation presents for evaluation.    Patient recently had PEG tube placed last month.  She reports that tube is functioning well.  She is getting tube feeds daily.  She is tolerating the tube feeds.  " She had questions about the management of the tube and tube feeds today.  I discussed with her that I will send a message to the nutrition team who can help answer those questions and help her with the management of the feeding tube as well.  She does report increased amount of phlegm in the throat which she has to spit out.    Her PEG tube was examined.  External bumper at 2.5 cm, rotatable and movable.  No inflammation or swelling seen at the site of the gastrostomy tube.    Patient had labs done last month which were reviewed by me today and found to have normal blood counts and renal function.          Review of Systems   Constitutional:  Negative for chills and fever.   HENT:  Negative for ear pain and sore throat.    Eyes:  Negative for pain and visual disturbance.   Respiratory:  Negative for cough and shortness of breath.    Cardiovascular:  Negative for chest pain and palpitations.   Gastrointestinal:  Negative for abdominal pain and vomiting.   Genitourinary:  Negative for dysuria and hematuria.   Musculoskeletal:  Negative for arthralgias and back pain.   Skin:  Negative for color change and rash.   Neurological:  Negative for seizures and syncope.   All other systems reviewed and are negative.         Objective   /66   Temp 98 °F (36.7 °C) (Axillary)   Ht 5' 10\" (1.778 m)   Wt 66 kg (145 lb 9.6 oz)   BMI 20.89 kg/m²      Physical Exam  Vitals and nursing note reviewed.   Constitutional:       General: She is not in acute distress.     Appearance: She is well-developed.   HENT:      Head: Normocephalic and atraumatic.     Eyes:      Conjunctiva/sclera: Conjunctivae normal.       Cardiovascular:      Rate and Rhythm: Normal rate and regular rhythm.      Heart sounds: No murmur heard.  Pulmonary:      Effort: Pulmonary effort is normal. No respiratory distress.      Breath sounds: Normal breath sounds.   Abdominal:      Palpations: Abdomen is soft.      Tenderness: There is no abdominal " tenderness.     Musculoskeletal:         General: No swelling.      Cervical back: Neck supple.     Skin:     General: Skin is warm and dry.      Capillary Refill: Capillary refill takes less than 2 seconds.     Neurological:      Mental Status: She is alert.     Psychiatric:         Mood and Affect: Mood normal.

## 2025-07-22 NOTE — TELEPHONE ENCOUNTER
The pt had questions about swimming with feeding tube.  She also wanted to talk to the nutritionist about different formula she is not gaining any weight and feels hungry and tried in the afternoon.    She believes she uses [facility] for her supplies.

## 2025-07-22 NOTE — PATIENT INSTRUCTIONS
Scheduled date of EGD(as of today):07.24.25  Physician performing EGD:DR PATEL  Location of EGD:[facility]  Instructions reviewed with patient by:SHILPI  Clearances: N/A

## 2025-07-22 NOTE — ASSESSMENT & PLAN NOTE
Patient has ongoing symptoms.  She reports increased phlegm collection in the back of her throat that she needs to spit out occasionally.  She has critical pharyngeal and upper esophageal stricture which was dilated in the past.  It was recommended that she get repeat EGD with dilation with triamcinolone.  I will order the procedure today to be done with advanced endoscopy.  She would also benefit from ENT referral given she has pharyngeal disease as well causing her dysphagia symptoms.  She is agreeable and referral to ENT was made.  I reached out to the nutrition team and requested them to contact the patient for the management of tube feeds and the feeding tube.  Orders:    Ambulatory Referral to Otolaryngology; Future    EGD Dilation; Future

## 2025-07-24 ENCOUNTER — HOSPITAL ENCOUNTER (OUTPATIENT)
Dept: GASTROENTEROLOGY | Facility: HOSPITAL | Age: 75
Setting detail: OUTPATIENT SURGERY
Discharge: HOME | End: 2025-07-24
Attending: INTERNAL MEDICINE
Payer: MEDICARE

## 2025-07-24 ENCOUNTER — ANESTHESIA (OUTPATIENT)
Dept: GASTROENTEROLOGY | Facility: HOSPITAL | Age: 75
End: 2025-07-24
Payer: MEDICARE

## 2025-07-24 ENCOUNTER — ANESTHESIA EVENT (OUTPATIENT)
Dept: GASTROENTEROLOGY | Facility: HOSPITAL | Age: 75
End: 2025-07-24
Payer: MEDICARE

## 2025-07-24 VITALS
DIASTOLIC BLOOD PRESSURE: 64 MMHG | WEIGHT: 140 LBS | BODY MASS INDEX: 20.04 KG/M2 | SYSTOLIC BLOOD PRESSURE: 117 MMHG | HEART RATE: 72 BPM | RESPIRATION RATE: 16 BRPM | HEIGHT: 70 IN | TEMPERATURE: 97.3 F | OXYGEN SATURATION: 96 %

## 2025-07-24 DIAGNOSIS — C07 CANCER OF PAROTID GLAND (HCC): ICD-10-CM

## 2025-07-24 DIAGNOSIS — K22.2 ESOPHAGEAL STRICTURE: ICD-10-CM

## 2025-07-24 DIAGNOSIS — R13.10 DYSPHAGIA, UNSPECIFIED TYPE: Primary | ICD-10-CM

## 2025-07-24 DIAGNOSIS — R13.10 DYSPHAGIA, UNSPECIFIED TYPE: ICD-10-CM

## 2025-07-24 PROCEDURE — 43249 ESOPH EGD DILATION <30 MM: CPT | Performed by: INTERNAL MEDICINE

## 2025-07-24 PROCEDURE — 43248 EGD GUIDE WIRE INSERTION: CPT | Performed by: INTERNAL MEDICINE

## 2025-07-24 PROCEDURE — C1726 CATH, BAL DIL, NON-VASCULAR: HCPCS

## 2025-07-24 PROCEDURE — C1769 GUIDE WIRE: HCPCS

## 2025-07-24 RX ORDER — SODIUM CHLORIDE 9 MG/ML
INJECTION, SOLUTION INTRAVENOUS CONTINUOUS PRN
Status: DISCONTINUED | OUTPATIENT
Start: 2025-07-24 | End: 2025-07-24

## 2025-07-24 RX ORDER — PROPOFOL 10 MG/ML
INJECTION, EMULSION INTRAVENOUS CONTINUOUS PRN
Status: DISCONTINUED | OUTPATIENT
Start: 2025-07-24 | End: 2025-07-24

## 2025-07-24 RX ORDER — PROPOFOL 10 MG/ML
INJECTION, EMULSION INTRAVENOUS AS NEEDED
Status: DISCONTINUED | OUTPATIENT
Start: 2025-07-24 | End: 2025-07-24

## 2025-07-24 RX ORDER — LIDOCAINE HYDROCHLORIDE 10 MG/ML
INJECTION, SOLUTION EPIDURAL; INFILTRATION; INTRACAUDAL; PERINEURAL AS NEEDED
Status: DISCONTINUED | OUTPATIENT
Start: 2025-07-24 | End: 2025-07-24

## 2025-07-24 RX ADMIN — PROPOFOL 100 MG: 10 INJECTION, EMULSION INTRAVENOUS at 12:17

## 2025-07-24 RX ADMIN — LIDOCAINE HYDROCHLORIDE 50 MG: 10 INJECTION, SOLUTION EPIDURAL; INFILTRATION; INTRACAUDAL at 12:16

## 2025-07-24 RX ADMIN — SODIUM CHLORIDE: 0.9 INJECTION, SOLUTION INTRAVENOUS at 12:13

## 2025-07-24 RX ADMIN — PROPOFOL 125 MCG/KG/MIN: 10 INJECTION, EMULSION INTRAVENOUS at 12:17

## 2025-07-24 NOTE — ANESTHESIA POSTPROCEDURE EVALUATION
Post-Op Assessment Note    CV Status:  Stable  Pain Score: 0    Pain management: adequate       Mental Status:  Sleepy   Hydration Status:  Euvolemic   PONV Controlled:  Controlled   Airway Patency:  Patent     Post Op Vitals Reviewed: Yes    No anethesia notable event occurred.    Staff: CRNA           Last Filed PACU Vitals:  Vitals Value Taken Time   Temp 97.1 07/24/2025 12:57   Pulse 76 07/24/2025 12:57   /64 07/24/2025 12:57   Resp 12 07/24/2025 12:58   SpO2 100 07/24/2025 12:58

## 2025-07-24 NOTE — H&P
"History and Physical -  [facility]  Kelly Lind 75 y.o. female MRN: [id number]                  HPI: Kelly Lind is a 75 y.o. year old female who presents for EGD for dysphagia.       REVIEW OF SYSTEMS: Per the HPI, and otherwise unremarkable.    Historical Information   Past Medical History[1]  Past Surgical History[2]  Social History   Social History     Substance and Sexual Activity   Alcohol Use Never     Social History     Substance and Sexual Activity   Drug Use Never     Tobacco Use History[3]  Family History[4]    Meds/Allergies     Current Medications[5]    Allergies[6]    Objective     /71   Pulse 73   Temp (!) 96.7 °F (35.9 °C) (Tympanic)   Resp 16   Ht 5' 10\" (1.778 m)   Wt 63.5 kg (140 lb)   SpO2 98%   BMI 20.09 kg/m²       PHYSICAL EXAM    Gen: NAD  Head: NCAT  CV: RRR  CHEST: Clear  ABD: soft, NT/ND  EXT: no edema      ASSESSMENT/PLAN:  This is a 75 y.o. year old female here for EGD, and she is stable and optimized for her procedure.             [1]   Past Medical History:  Diagnosis Date    Cancer (HCC)     Dysphagia     Goiter    [2]   Past Surgical History:  Procedure Laterality Date    COLONOSCOPY      HERNIA REPAIR      HIP SURGERY Bilateral     HIP SURGERY Bilateral     HYSTERECTOMY      PAROTIDECTOMY      REPLACEMENT TOTAL KNEE BILATERAL Left     SKIN BIOPSY      UPPER GASTROINTESTINAL ENDOSCOPY     [3]   Social History  Tobacco Use   Smoking Status Never    Passive exposure: Never   Smokeless Tobacco Never   [4]   Family History  Problem Relation Name Age of Onset    No Known Problems Mother      Parkinsonism Father      Diabetes Sister      No Known Problems Brother      Esophageal cancer Brother Rebort     Dementia Maternal Grandmother      Bilateral breast cancer Daughter     [5]   Current Outpatient Medications:     aspirin 81 mg chewable tablet    fosinopril (MONOPRIL) 40 mg tablet    levothyroxine 100 mcg tablet    PARoxetine (PAXIL) 20 mg tablet    " triamterene-hydrochlorothiazide (DYAZIDE) 37.5-25 mg per capsule  [6]   Allergies  Allergen Reactions    Amoxicillin Swelling

## 2025-07-24 NOTE — ANESTHESIA PREPROCEDURE EVALUATION
Procedure:  EGD    Relevant Problems   CARDIO   (+) Essential hypertension      ENDO   (+) Acquired hypothyroidism      GI/HEPATIC   (+) Dysphagia      NEURO/PSYCH   (+) Anxiety        Physical Exam    Airway     Mallampati score: II  TM Distance: >3 FB  Neck ROM: full      Cardiovascular  Cardiovascular exam normal    Dental       Pulmonary  Pulmonary exam normal     Neurological      Other Findings  post-pubertal.      Anesthesia Plan  ASA Score- 3     Anesthesia Type- IV sedation with anesthesia with ASA Monitors.         Additional Monitors:     Airway Plan:            Plan Factors-Exercise tolerance (METS): >4 METS.    Chart reviewed. EKG reviewed. Imaging results reviewed. Existing labs reviewed. Patient summary reviewed.                  Induction- intravenous.    Postoperative Plan-         Informed Consent- Anesthetic plan and risks discussed with patient.  I personally reviewed this patient with the CRNA. Discussed and agreed on the Anesthesia Plan with the CRNA..      NPO Status:  Vitals Value Taken Time   Date of last liquid 07/23/25 07/24/25 11:45   Time of last liquid 2100 07/24/25 11:45   Date of last solid 07/23/25 07/24/25 11:45   Time of last solid 2100 07/24/25 11:45

## 2025-07-31 ENCOUNTER — OFFICE VISIT (OUTPATIENT)
Dept: FAMILY MEDICINE CLINIC | Facility: CLINIC | Age: 75
End: 2025-07-31
Payer: MEDICARE

## 2025-07-31 VITALS
WEIGHT: 146 LBS | HEART RATE: 75 BPM | SYSTOLIC BLOOD PRESSURE: 102 MMHG | RESPIRATION RATE: 16 BRPM | OXYGEN SATURATION: 99 % | DIASTOLIC BLOOD PRESSURE: 62 MMHG | BODY MASS INDEX: 20.95 KG/M2 | TEMPERATURE: 97 F

## 2025-07-31 DIAGNOSIS — Z12.31 ENCOUNTER FOR SCREENING MAMMOGRAM FOR MALIGNANT NEOPLASM OF BREAST: ICD-10-CM

## 2025-07-31 DIAGNOSIS — I10 ESSENTIAL HYPERTENSION: Primary | ICD-10-CM

## 2025-07-31 DIAGNOSIS — Z12.39 SCREENING BREAST EXAMINATION: ICD-10-CM

## 2025-07-31 DIAGNOSIS — E03.9 ACQUIRED HYPOTHYROIDISM: ICD-10-CM

## 2025-07-31 PROBLEM — J69.0 ASPIRATION PNEUMONITIS (HCC): Status: RESOLVED | Noted: 2025-06-02 | Resolved: 2025-07-31

## 2025-07-31 PROBLEM — E87.1 HYPONATREMIA: Status: RESOLVED | Noted: 2025-06-01 | Resolved: 2025-07-31

## 2025-07-31 PROCEDURE — 99213 OFFICE O/P EST LOW 20 MIN: CPT | Performed by: INTERNAL MEDICINE

## 2025-07-31 PROCEDURE — G2211 COMPLEX E/M VISIT ADD ON: HCPCS | Performed by: INTERNAL MEDICINE

## 2025-07-31 RX ORDER — FOSINOPRIL SODIUM 40 MG/1
20 TABLET ORAL DAILY
Start: 2025-07-31

## 2025-08-13 ENCOUNTER — TELEPHONE (OUTPATIENT)
Dept: NUTRITION | Facility: HOSPITAL | Age: 75
End: 2025-08-13